# Patient Record
Sex: MALE | Race: ASIAN | NOT HISPANIC OR LATINO | Employment: OTHER | URBAN - METROPOLITAN AREA
[De-identification: names, ages, dates, MRNs, and addresses within clinical notes are randomized per-mention and may not be internally consistent; named-entity substitution may affect disease eponyms.]

---

## 2017-08-24 ENCOUNTER — DOCUMENTATION ONLY (OUTPATIENT)
Dept: VASCULAR SURGERY | Facility: CLINIC | Age: 67
End: 2017-08-24

## 2017-08-24 ENCOUNTER — OFFICE VISIT (OUTPATIENT)
Dept: INTERNAL MEDICINE | Facility: CLINIC | Age: 67
End: 2017-08-24

## 2017-08-24 VITALS
OXYGEN SATURATION: 96 % | HEART RATE: 78 BPM | BODY MASS INDEX: 25.62 KG/M2 | WEIGHT: 168.5 LBS | DIASTOLIC BLOOD PRESSURE: 84 MMHG | TEMPERATURE: 97.5 F | SYSTOLIC BLOOD PRESSURE: 138 MMHG | RESPIRATION RATE: 16 BRPM

## 2017-08-24 DIAGNOSIS — Z00.00 ENCOUNTER FOR ROUTINE ADULT HEALTH EXAMINATION WITHOUT ABNORMAL FINDINGS: ICD-10-CM

## 2017-08-24 DIAGNOSIS — F17.200 CURRENT SMOKER: Primary | ICD-10-CM

## 2017-08-24 DIAGNOSIS — Z11.59 NEED FOR HEPATITIS C SCREENING TEST: ICD-10-CM

## 2017-08-24 DIAGNOSIS — Z23 NEED FOR PROPHYLACTIC VACCINATION WITH TETANUS-DIPHTHERIA (TD): ICD-10-CM

## 2017-08-24 DIAGNOSIS — Z13.6 SCREENING FOR AAA (ABDOMINAL AORTIC ANEURYSM): ICD-10-CM

## 2017-08-24 DIAGNOSIS — R05.9 COUGH: ICD-10-CM

## 2017-08-24 LAB
CHOLEST SERPL-MCNC: 197 MG/DL
HCV AB SERPL QL IA: NONREACTIVE
HDLC SERPL-MCNC: 46 MG/DL
LDLC SERPL CALC-MCNC: 124 MG/DL
NONHDLC SERPL-MCNC: 151 MG/DL
TRIGL SERPL-MCNC: 135 MG/DL

## 2017-08-24 RX ORDER — NICOTINE 21 MG/24HR
1 PATCH, TRANSDERMAL 24 HOURS TRANSDERMAL EVERY 24 HOURS
Qty: 28 PATCH | Refills: 5 | Status: SHIPPED | OUTPATIENT
Start: 2017-08-24 | End: 2019-07-01

## 2017-08-24 RX ORDER — IBUPROFEN 400 MG/1
400 TABLET, FILM COATED ORAL EVERY 6 HOURS PRN
COMMUNITY
End: 2023-09-12

## 2017-08-24 NOTE — NURSING NOTE
Patient received Tdap vaccine.  See immunization list for administration details.  Patient tolerated injection well.     AYSE MCCORD at 8:39 AM on 8/24/2017.

## 2017-08-24 NOTE — MR AVS SNAPSHOT
After Visit Summary   8/24/2017    Silke Moralez    MRN: [id number]           Patient Information     Date Of Birth          1950        Visit Information        Provider Department      8/24/2017 7:30 AM [place] Mcnulty MD [facility]        Today's Diagnoses     Current smoker    -  1    Need for hepatitis C screening test        Need for prophylactic vaccination with tetanus-diphtheria (TD)        Screening for AAA (abdominal aortic aneurysm)        Encounter for routine adult health examination without abnormal findings        Cough          Care Instructions    [facility] Medication Refill Request Information:  * Please contact your pharmacy regarding ANY request for medication refills.  ** [facility] Prescription Fax = [phone]  * Please allow 3 business days for routine medication refills.  * Please allow 5 business days for controlled substance medication refills.     [facility] Test Result notification information:  *You will be notified with in 7-10 days of your appointment day regarding the results of your test.  If you are on MyChart you will be notified as soon as the provider has reviewed the results and signed off on them.    Primary Care Center [phone]     HOW TO QUIT SMOKING  Smoking is one of the hardest habits to break. About half of all those who have ever smoked have been able to quit, and most of those (about 70%) who still smoke want to quit. Here are some of the best ways to stop smoking.     KEEP TRYING:  It takes most smokers about 8 tries before they are finally able to fully quit. So, the more often you try and fail, the better your chance of quitting the next time! So, don't give up!    GO COLD TURKEY:  Most ex-smokers quit cold turkey. Trying to cut back gradually doesn't seem to work as well, perhaps because it continues the smoking habit. Also, it is possible to fool yourself by inhaling more while smoking fewer cigarettes. This  results in the same amount of nicotine in your body!    GET SUPPORT:  Support programs can make an important difference, especially for the heavy smoker. These groups offer lectures, methods to change your behavior and peer support. Call the free national Quitline for more information. 800-QUIT-NOW (325-018-7605). Low-cost or free programs are offered by many hospitals, local chapters of the American Lung Association (901-657-6126) and the American Cancer Society (903-756-3044). Support at home is important too. Non-smokers can help by offering praise and encouragement. If the smoker fails to quit, encourage them to try again!    OVER-THE-COUNTER MEDICINES:  For those who can't quit on their own, Nicotine Replacement Therapy (NRT) may make quitting much easier. Certain aids such as the nicotine patch, gum and lozenge are available without a prescription. However, it is best to use these under the guidance of your doctor. The skin patch provides a steady supply of nicotine to the body. Nicotine gum and lozenge gives temporary bursts of low levels of nicotine. Both methods take the edge off the craving for cigarettes. WARNING: If you feel symptoms of nicotine overdose, such as nausea, vomiting, dizziness, weakness, or fast heartbeat, stop using these and see your doctor.    PRESCRIPTION MEDICINES:  After evaluating your smoking patterns and prior attempts at quitting, your doctor may offer a prescription medicine such as bupropion (Zyban, Wellbutrin), varenicline (Chantix, Champix), a niocotine inhaler or nasal spray. Each has its unique advantage and side effects which your doctor can review with you.    HEALTH BENEFITS OF QUITTING:  The benefits of quitting start right away and keep improving the longer you go without smokin minutes: blood pressure and pulse return to normal  8 hours: oxygen levels return to normal  2 days: ability to smell and taste begins to improve as damaged nerves start to regrow  2-3  "weeks: circulation and lung function improves  1-9 months: decreased cough, congestion and shortness of breath; less tired  1 year: risk of heart attack decreases by half  5 years: risk of lung cancer decreases by half; risk of stroke becomes the same as a non-smoker  For information about how to quit smoking, visit the following links:  National Cancer [place] ,   Clearing the Air, Quit Smoking Today   - an online booklet. http://www.smokefree.gov/pubs/clearing_the_air.pdf  Smokefree.gov http://smokefree.gov/  QuitNet http://www.quitnet.com/    8673-3750 Chintan Grant, [address] [place] [address], [place] [address]. All rights reserved. This information is not intended as a substitute for professional medical care. Always follow your healthcare professional's instructions.        Nicotine Gum (Nicorette, polacrilex nicotine gum)      Dosing:    >25 cigarettes per day Dose   Weeks 1-6 Chew 1 piece of 4 mg gum every 1-2 hours. Maximum of 24 pieces a day.   Weeks 7-9 Chew 1 piece of 4 mg gum every 2-4 hours. Maximum of 24 pieces a day.   Weeks 10-12 Chew 1 piece of 4 mg gum every 4-8 hours. Maximum of 24 pieces a day.   < 25 cigarettes per day    Weeks 1-6 Chew 1 piece of 2 mg gum every 1-2 hours. Maximum of 24 pieces a day.   Weeks 7-9 Chew 1 piece of 2 mg gum every 2-4 hours. Maximum of 24 pieces a day.   Weeks 10-12 Chew 1 piece of 2 mg gum every 4-8 hours. Maximum of 24 pieces a day.     How to use nicotine gum:    Chew the gum slowly until you notice a tingly feeling or peppery taste.     Then \"park\" the gum between your teeth and your cheek and let it sit there until you don't notice the tingly feeling or taste anymore.     Chew the gum slowly again until you notice the tingly feeling or peppery taste again and \"park\" the gum on the other side of your mouth.     Repeat this process for 30 minutes, then throw the gum away.     Especially at the beginning, use the gum whenever you would normally smoke a " cigarette.    Some tips:    Do not smoke while you are using nicotine gum.     Do not swallow the gum.     Do not chew the gum like normal gum--you will swallow the nicotine instead of absorbing it. You are also more likely to get indigestion or nausea.     Do not drink anything, including water, while you are chewing gum.     Avoid acidic drinks like coffee and pop right before chewing the gum.     As your body becomes less dependent on nicotine, you will need to chew less gum.     Follow up with your health care provider if you have any questions and also to help taper your nicotine gum dose.    Side Effects:  Some people may experience some indigestion or nausea. Using proper chewing technique may help. If you experience any other troublesome or unusual side effects, call your health care provider.    Nicotine Patch    Dosing:    >10 cigarettes per day Dose   Weeks 1-6 Use one 21 mg patch per day.   Weeks 7-8 Use one 14 mg patch per day.   Weeks 9-10 Use one 7 mg patch per day   <10 cigarettes per day  Weeks 1-6 Use one 14 mg patch per day   Weeks 7-8 Use one 7 mg patch per day       How to use the Nicotine Patch:    Apply a new patch to non-hairy, clean, dry skin on the upper body or upper outer arm.  Remove backing from patch and press on skin.  Hold for 10 seconds.    Apply patch around the same time every day.    Wash your hands after applying the patch.    Remove the patch at the end of the day before you go to bed.    Apply a new patch the next morning.    Do not apply the patch to an area where you have worn a patch in the last week.   This will help prevent or reduce skin irritation.    Some Tips:    Do not smoke while you are using the nicotine patch!    Do not cut the nicotine patch -it will be ineffective.    Remove the patch prior to receiving an MRI since the patch may contain some metal.    Do not put the patch on irritated, cut, or burned skin.    If the patch falls off and cannot be reapplied, put  on a new patch.    Follow -up with your health care professional if you have any questions and also to help taper your nicotine patch dose.    Side Effects:  Some people experience some skin irritation where the patch was placed.  Moving around the site where you are putting the patch each day should help.  If you experience any other troublesome or unusual side effects, call your health care professional.            The Benefits of Living Smoke Free  What do you want to gain from quitting? Check off some reasons to quit.  Health Benefits  ___ Reduce my risk of lung cancer, heart disease, chronic lung disease  ___ Have fewer wrinkles and softer skin  ___ Improve my sense of taste and smell  ___ For pregnant women--reduce the risk of having a miscarriage, stillbirth, premature birth, or low-birth-weight baby  Personal Benefits  ___ Feel more in control of my life  ___ Have better-smelling hair, breath, clothes, home, and car  ___ Save time by not having to take smoke breaks, buy cigarettes, or hunt for a light  ___ Have whiter teeth  Family Benefits  ___ Reduce my children s respiratory tract infections  ___ Set a good example for my children  ___ Reduce my family s cancer risk  Financial Benefits  ___ Save hundreds of dollars each year that would be spent on cigarettes  ___ Save money on medical bills  ___ Save on life, health, and car insurance premiums    Those Dollars Add Up!  Cigarettes are expensive, and getting more expensive all the time. Do you realize how much money you are spending on cigarettes per year? What is the average amount you spend on a pack of cigarettes? What is the average number of packs that you smoke per day? Using your answers to these questions, fill in this formula to help you find out:  ($ _____ per pack) ×  ( _____ number of packs per day) × (365 days) =  $ _____ yearly cost of smoking  Besides tobacco, there are other costs, including extra cleaning bills and replacement costs for  clothing and furniture; medical expenses for smoking-related illnesses; and higher health, life, and car insurance premiums.    Cigars and Pipes Count Too!  Cigars and pipes are also dangerous. So are smokeless (chewing) tobacco and snuff. All of these products contain nicotine, a highly addictive substance that has harmful effects on your body. Quitting smoking means giving up all tobacco products.      6536-9085 [facility] [facility], [address] [place] [address], [place] [address]. All rights reserved. This information is not intended as a substitute for professional medical care. Always follow your healthcare professional's instructions.  Lung Cancer Screening   Frequently Asked Questions  If you are at high-risk for lung cancer, getting screened with low-dose computed tomography (LDCT) every year can help save your life. This handout offers answers to some of the most common questions about lung cancer screening. If you have other questions, please call [phone] ([phone]).     What is it?  Lung cancer screening uses special X-ray technology to create an image of your lung tissue. The exam is quick and easy and takes less than 10 seconds. We don t give you any medicine or use any needles. You can eat before and after the exam. You don t need to change your clothes as long as the clothing on your chest doesn t contain metal. But, you do need to be able to hold your breath for at least 6 seconds during the exam.    What is the goal of lung cancer screening?  The goal of lung cancer screening is to save lives. Many times, lung cancer is not found until a person starts having physical symptoms. Lung cancer screening can help detect lung cancer in the earliest stages when it may be easier to treat.    Who should be screened for lung cancer?  We suggest lung cancer screening for anyone who is at high-risk for lung cancer. You are in the high-risk group if you:      are between the ages of 55 and 79, and    have  smoked at least 1 pack of cigarettes a day for 30 or more years, and    still smoke or have quit within the past 15 years.    However, if you have a new cough or shortness of breath, you should talk to your doctor before being screened.    Some national lung health advocacy groups also recommend screening for people ages 50 to 79 who have smoked an average of 1 pack of cigarettes a day for 20 years. They must also have at least 1 other risk factor for lung cancer, not including exposure to secondhand smoke. Other risk factors are having had cancer in the past, emphysema, pulmonary fibrosis, COPD, a family history of lung cancer, or exposure to certain materials such as arsenic, asbestos, beryllium, cadmium, chromium, diesel fumes, nickel, radon or silica. Your care team can help you know if you have one of these risk factors.     Why does it matter if I have symptoms?  Certain symptoms can be a sign that you have a condition in your lungs that should be checked and treated by your doctor. These symptoms include fever, chest pain, a new or changing cough, shortness of breath that you have never felt before, coughing up blood or unexplained weight loss. Having any of these symptoms can greatly affect the results of lung cancer screening.       Should all smokers get an LDCT lung cancer screening exam?  It depends. Lung cancer screening is for a very specific group of men and women who have a history of heavy smoking over a long period of time (see  Who should be screened for lung cancer  above).  I am in the high-risk group, but have been diagnosed with cancer in the past. Is LDCT lung cancer screening right for me?  In some cases, you should not have LDCT lung screening, such as when your doctor is already following your cancer with CT scan studies. Your doctor will help you decide if LDCT lung screening is right for you.  Do I need to have a screening exam every year?  Yes. If you are in the high-risk group  described earlier, you should get an LDCT lung cancer screening exam every year until you are 79, or are no longer willing or able to undergo screening and possible procedures to diagnose and treat lung cancer.  How effective is LDCT at preventing death from lung cancer?  Studies have shown that LDCT lung cancer screening can lower the risk of death from lung cancer by 20 percent in people who are at high-risk.  What are the risks?  There are some risks and limitations of LDCT lung cancer screening. We want to make sure you understand the risks and benefits, so please let us know if you have any questions. Your doctor may want to talk with you more about these risks.    Radiation exposure: As with any exam that uses radiation, there is a very small increased risk of cancer. The amount of radiation in LDCT is small--about the same amount a person would get from a mammogram. Your doctor orders the exam when he or she feels the potential benefits outweigh the risks.    False negatives: No test is perfect, including LDCT. It is possible that you may have a medical condition, including lung cancer, that is not found during your exam. This is called a false negative result.    False positives and more testing: LDCT very often finds something in the lung that could be cancer, but in fact is not. This is called a false positive result. False positive tests often cause anxiety. To make sure these findings are not cancer, you may need to have more tests. These tests will be done only if you give us permission. Sometimes patients need a treatment that can have side effects, such as a biopsy. For more information on false positives, see  What can I expect from the results?     Findings not related to lung cancer: Your LDCT exam also takes pictures of areas of your body next to your lungs. In a very small number of cases, the CT scan will show an abnormal finding in one of these areas, such as your kidneys, adrenal glands, liver  or thyroid. This finding may not be serious, but you may need more tests. Your doctor can help you decide what other tests you may need, if any.  What can I expect from the results?  About 1 out of 4 LDCT exams will find something that may need more tests. Most of the time, these findings are lung nodules. Lung nodules are very small collections of tissue in the lung. These nodules are very common, and the vast majority--more than 97 percent--are not cancer (benign). Most are normal lymph nodes or small areas of scarring from past infections.  But, if a small lung nodule is found to be cancer, the cancer can be cured more than 90 percent of the time. To know if the nodule is cancer, we may need to get more images before your next yearly screening exam. If the nodule has suspicious features (for example, it is large, has an odd shape or grows over time), we will refer you to a specialist for further testing.  Will my doctor also get the results?  Yes. Your doctor will get a copy of your results.  Is it okay to keep smoking now that there s a cancer screening exam?  No. Tobacco is one of the strongest cancer-causing agents. It causes not only lung cancer, but other cancers and cardiovascular (heart) diseases as well. The damage caused by smoking builds over time. This means that the longer you smoke, the higher your risk of disease. While it is never too late to quit, the sooner you quit, the better.  Where can I find help to quit smoking?  The best way to prevent lung cancer is to stop smoking. If you have already quit smoking, congratulations and keep it up! For help on quitting smoking, please call QUITPLAN at 5-003-479-GQCI (0498) or the American Cancer Society at 1-377.687.4859 to find local resources near you.  One-on-one health coaching:  If you d prefer to work individually with a health care provider on tobacco cessation, we offer:      Medication Therapy Management:  Our specially trained pharmacists work  closely with you and your doctor to help you quit smoking.  Call [phone] or [phone] (toll free).     Can Do: Health coaching offered by [facility].  www.can-do-health.com    Resources to Help You Quit Smoking  If you have quit smoking or are thinking about quitting, congratulations! It can be hard to quit smoking, but the benefits are well worth it. To help you quit and stay smoke-free, there are many resources that can help.   Your health plan  If you have health insurance, call them for more details about their phone coaching programs.    Blue Cross and [facility]: 2-614-619-BLUE     [employer]: 7-423-769-QUIT     [facility]: 6-820-598-BLUE     [facility]: [phone]     [employer]: [phone]     [facility] members: [phone]     [facility]: [phone]     [facility]: [phone]     [facility]: [phone]     [facility]    One-on-one coaching with a specially trained Medication Therapy Management (MTM) pharmacist: [phone] or [phone] (toll free)    Group classes with a health  to help you create a personal quit plan, including prescription quit aides if necessary: [employer] group classes: [phone]  American Cancer Society: 1-834.534.6239  The American Cancer Society can help you find local resources to quit smoking.     QUITPLAN: 6-865-383-PLAN (8127)  QUITPLAN Services offers a telephone helpline, gum, patches and lozenges. These services are free for the uninsured and those without coverage. The QUITPLAN online program is free to everyone at www.quitplan.com    American Lung Association: 2-043-FFBC-USA (849-1161)  The American Lung Association offers a lung helpline as well as an online program, self-help book and group clinic support for quitting smoking at www.lung.org/stop-smoking    National Cancer [place]:  6-084-739-QUIT (4090)  The National Cancer [place] offers a telephone hotline, online text chat and a website with tools, information and support for smokers who want to quit at www.smokefree.gov            Follow-ups after your visit        Additional Services     MED THERAPY MANAGE REFERRAL       Your provider has referred you to: **[facility] Scheduling (numerous locations) [phone]   [email or web address]  [facility]: [facility] [phone]   [email or web address]    Reason for Referral: smoking cessation    The [facility] department will contact you to schedule an appointment.  You may also schedule the appointment by calling [phone].  For [facility] [place] patients, please call [phone] to confirm/schedule your appointment on the next business day.    This service is designed to help you get the most from your medications.  A specially trained Pharmacist will work closely with you and your providers to solve any questions, concerns, issues or problems related to your medications.    Please bring all of your prescription and non-prescription medications (such as vitamins, over-the-counter medications, and herbals) or a detailed medication list to your appointment.    If you have a glucose meter or other home monitoring information, please also bring this to your appointment (i.e. blood glucose log, blood pressure log, pain log, etc.).                  Follow-up notes from your care team     Return in about 4 weeks (around 9/21/2017).      Your next 10 appointments already scheduled     Aug 24, 2017  9:00 AM CDT   LAB with  LAB    Health Lab ([facility])    [address] [place] [id number] [phone]           Patient must bring picture ID. Patient should be prepared to give a  urine specimen  Please do not eat 10-12 hours before your appointment if you are coming in fasting for labs on lipids, cholesterol, or glucose (sugar). Pregnant women should follow their Care Team instructions. Water with medications is okay. Do not drink coffee or other fluids. If you have concerns about taking  your medications, please ask at office or if scheduling via [employer], send a message by clicking on Secure Messaging, Message Your Care Team.            Aug 24, 2017 10:00 AM CDT   US ABDOMINAL AORTIC LIMITED with [id number]   [facility] US ([facility])    [address] [place] [id number] [phone]           Please bring a list of your medicines (including vitamins, minerals and over-the-counter drugs). Also, tell your doctor about any allergies you may have. Wear comfortable clothes and leave your valuables at home.  Adults: No eating or drinking for 8 hours before the exam. You may take medicine with a small sip of water.  Children: - Children 6+ years: No food or drink for 6 hours before exam. - Children 1-5 years: No food or drink for 4 hours before exam. - Infants, breast-fed: may have breast milk up to 2 hours before exam. - Infants, formula: may have bottle until 4 hours before exam.  Please call the Imaging Department at your exam site with any questions.            Aug 24, 2017  8:00 PM CDT   (Arrive by 7:45 PM)   CT LUNG RESEARCH ALMAGUER with UCCT1   [facility] CT ([facility])    [address] [place] [id number] [phone]           Please bring any scans or X-rays taken at other hospitals, if similar tests were done. Also bring a list of your medicines, including vitamins, minerals and over-the-counter drugs. It is safest to leave personal items at home.  Be sure to tell your doctor:   If you have any allergies.   If there s any chance you are pregnant.   If you are  breastfeeding.   If you have any special needs.  You do not need to do anything special to prepare.  Please wear loose clothing, such as a sweat suit or jogging clothes. Avoid snaps, zippers and other metal. We may ask you to undress and put on a hospital gown.            Aug 30, 2017  7:00 AM CDT   FULL PULMONARY FUNCTION with  PFL C   [facility] Pulmonary Function Testing ([facility])    [address] [place], [place] [address]  [address] [facility] [address] [phone]            Sep 01, 2017 10:00 AM CDT   (Arrive by 9:45 AM)   Office Visit with Mora Carter RPH   [facility] ([facility])    [address] [place] [address]  [address] [facility] [address] [phone]           Bring a current list of meds and any records pertaining to this visit. For Physicals, please bring immunization records and any forms needing to be filled out. Please arrive 10 minutes early to complete paperwork.            Sep 28, 2017  7:30 AM CDT   (Arrive by 7:15 AM)   Return Visit with Montana Mcnulty MD   [facility] ([facility])    [address] [place] [address]  [address] [facility] [address] [phone]              Future tests that were ordered for you today     Open Future Orders        Priority Expected Expires Ordered    Hepatitis C Screen Reflex to HCV RNA Quant and Genotype Routine 8/24/2017 8/24/2018 8/24/2017    Lipid panel reflex to direct LDL - -(Today) Routine 8/24/2017 8/24/2018 8/24/2017    CT Chest Lung Cancer Scrn Low Dose wo Routine  8/24/2018 8/24/2017    US Abdominal Aorta Limited Routine  8/24/2018 8/24/2017    General PFT Lab (Please always keep checked) Routine  8/24/2018 8/24/2017    Pulmonary Function Test Routine  8/24/2018 8/24/2017            Who to contact     Please call your clinic at [phone] to:    Ask questions about your health    Make or cancel appointments    Discuss your  medicines    Learn about your test results    Speak to your doctor   If you have compliments or concerns about an experience at your clinic, or if you wish to file a complaint, please contact [facility] Patient Relations at [phone] or email us at [email or web address]         Additional Information About Your Visit        goDog Fetchhart Information     [facility] [employer] is an electronic gateway that provides easy, online access to your medical records. With Pocket Change, you can request a clinic appointment, read your test results, renew a prescription or communicate with your care team.     To sign up for Pocket Change visit the website at [email or web address]   You will be asked to enter the access code listed below, as well as some personal information. Please follow the directions to create your username and password.     Your access code is: [email or web address]  Expires: 2017  6:30 AM     Your access code will  in 90 days. If you need help or a new code, please contact your [facility] or call [phone] for assistance.        Care EveryWhere ID     This is your Care EveryWhere ID. This could be used by other organizations to access your [facility] medical records  [id number]        Your Vitals Were     Pulse Temperature Respirations Pulse Oximetry BMI (Body Mass Index)       78 97.5  F (36.4  C) 16 96% 25.62 kg/m2        Blood Pressure from Last 3 Encounters:   17 138/84   16 117/72   09/16/15 122/40    Weight from Last 3 Encounters:   17 76.4 kg (168 lb 8 oz)   16 79.6 kg (175 lb 6.4 oz)   09/16/15 78 kg (172 lb)              We Performed the Following     Abdominal Aortic Aneurysm Screening/Tracking     MED THERAPY MANAGE REFERRAL     Okay for Smoking Cessation Study (PLUTO) to Contact Patient     Prof fee: Shared Decisionmaking for Lung Cancer Screening     TDAP ( BOOSTRIX AGES 10-64)          Today's Medication Changes           These changes are accurate as of: 8/24/17  8:39 AM.  If you have any questions, ask your nurse or doctor.               Start taking these medicines.        Dose/Directions    nicotine 21 MG/24HR 24 hr patch   Commonly known as:  NICODERM CQ   Used for:  Current smoker   Started by:  Montana Mcnulty MD        Dose:  1 patch   Place 1 patch onto the skin every 24 hours   Quantity:  28 patch   Refills:  5       nicotine polacrilex 2 MG gum   Commonly known as:  NICORETTE   Used for:  Current smoker   Started by:  Montana Mcnulty MD        Dose:  2 mg   Place 1 each (2 mg) inside cheek as needed for smoking cessation Place 1 each inside cheek as needed for smoking cessation   Quantity:  240 each   Refills:  5            Where to get your medicines      These medications were sent to [facility] [address]  [address] [address]    Hours:  TRANSPLANT PHONE NUMBER [phone] Phone:  [phone]     nicotine 21 MG/24HR 24 hr patch    nicotine polacrilex 2 MG gum                Primary Care Provider Office Phone # Fax #    Paulino Harper [phone]       [address] [place] [address] [address] [facility] [address]  [employer] [address]        Equal Access to Services     BRANDY BENNETT AH: Hadii angel garcia hadasho Soomaali, waaxda luqadaha, qaybta kaalmada adeegyada, rosaura ronquillo hayalban franklin. So [facility] [phone].    ATENCIÓN: Si habla español, tiene a lee disposición servicios gratuitos de asistencia lingüística. Llame al [phone].    We comply with applicable federal civil rights laws and [place] laws. We do not discriminate on the basis of race, color, national origin, age, disability sex, sexual orientation or gender identity.            Thank you!     Thank you for choosing [facility]  for your care. Our goal is always to provide you with excellent care. Hearing back from our patients is one way we can  continue to improve our services. Please take a few minutes to complete the written survey that you may receive in the mail after your visit with us. Thank you!             Your Updated Medication List - Protect others around you: Learn how to safely use, store and throw away your medicines at www.disposemymeds.org.          This list is accurate as of: 8/24/17  8:39 AM.  Always use your most recent med list.                   Brand Name Dispense Instructions for use Diagnosis    ibuprofen 400 MG tablet    ADVIL/MOTRIN     Take 400 mg by mouth every 6 hours as needed for moderate pain        nicotine 21 MG/24HR 24 hr patch    NICODERM CQ    28 patch    Place 1 patch onto the skin every 24 hours    Current smoker       nicotine polacrilex 2 MG gum    NICORETTE    240 each    Place 1 each (2 mg) inside cheek as needed for smoking cessation Place 1 each inside cheek as needed for smoking cessation    Current smoker       omeprazole 20 MG CR capsule    priLOSEC    30 capsule    Take 1 capsule (20 mg) by mouth daily With food    Gastroesophageal reflux disease, esophagitis presence not specified

## 2017-08-24 NOTE — PROGRESS NOTES
PRIMARY CARE CENTER       SUBJECTIVE:  Silke Moralez is a 67 year old male with a PMHx of tobacco abuse who comes in for cough    Cold 2 weeks ago, continued cough, no rhinorrhea, no fever or chills. Sputum light yellow. No SOB, RED, chest pain, fever, chills.  Has had similar episodes in the past following colds lasting 3-4 weeks. Has never had PFTs. Smoking since 24 years 2 pack per day, has recently decreased to 1 pack per day. Has tried patches, no gums or lozenges. Felt cravings for cigarettes on top of patch and continued smoking. Currently does not have much desire to quit but is willing to try.    Medications and allergies reviewed by me today.     ROS:   Constitutional, HEENT, cardiovascular, pulmonary, gi and gu systems are negative, except as otherwise noted.    OBJECTIVE:/84  Pulse 78  Temp 97.5  F (36.4  C)  Resp 16  Wt 76.4 kg (168 lb 8 oz)  SpO2 96%  BMI 25.62 kg/m2   Wt Readings from Last 1 Encounters:   08/24/17 76.4 kg (168 lb 8 oz)       GENERAL APPEARANCE: healthy, alert and no distress     HEENT: nc/at, eomi, anicteric, mmm     RESP: diminished breath sounds diffusely, no wheeze, no prolonged expiratory phase, no crackles, breathing comfortably on room air     CV: regular rates and rhythm, normal S1 S2, no S3 or S4 and no murmur, click or rub     ABDOMEN:  soft, nontender, no HSM or masses     EXT: no edema, wwp     SKIN: no suspicious lesions or rashes     NEURO: alert, speech fluent, gait normal     PSYCH: mentation appears normal. and affect normal/bright     ASSESSMENT/PLAN:    Silke was seen today for cough and lab only.    Diagnoses and all orders for this visit:    Current smoker  Cough  >>30 pack year smoking history, qualifies for lung cancer screening. Spent significant time discussing risks of continued smoking. Patient not enthused about quitting but is willing to try nicotine patches in combination with short acting gum. Given cough and significant  history of smoking, concern for underlying COPD. No wheeze on exam but high risk. Has never had PFTs. Cough is in line with prior cough following URI, no worrisome dyspnea, fever, chills.  -     Prof fee: Shared Decisionmaking for Lung Cancer Screening  -     CT Chest Lung Cancer Scrn Low Dose wo; Future  -     Okay for Smoking Cessation Study (PLUTO) to Contact Patient  -     nicotine (NICODERM CQ) 21 MG/24HR 24 hr patch; Place 1 patch onto the skin every 24 hours  -     nicotine polacrilex (NICORETTE) 2 MG gum; Place 1 each (2 mg) inside cheek as needed for smoking cessation Place 1 each inside cheek as needed for smoking cessation  -     General PFT Lab (Please always keep checked); Future  -     Pulmonary Function Test; Future        -     MED THERAPY MANAGE REFERRAL    Screening for AAA (abdominal aortic aneurysm)  Patient has smoked ~30 pack years and is >65  -     Abdominal Aortic Aneurysm Screening/Tracking  -     US Abdominal Aorta Limited; Future    Need for prophylactic vaccination with tetanus-diphtheria (TD)  -     TDAP ( BOOSTRIX AGES 10-64)    Encounter for routine adult health examination without abnormal findings  Need for hepatitis C screening test  -     Hepatitis C Screen Reflex to HCV RNA Quant and Genotype; Future  -     Lipid panel reflex to direct LDL - -(Today); Future    Pt should return to clinic for f/u with me in 1 month     Montana Mcnulty MD  Aug 24, 2017    Pt was seen and plan of care discussed with Dr Burger.   Mr Moralez was s een and examined with the resident Dr Mcnulty ,I have reviewed note and  plan for care  And I agree with both.  Marcelino Burger Md    Lung Cancer Screening Shared Decision Making Visit     Silke Moralez is eligible for lung cancer screening on the basis of the information provided in my signed lung cancer screening order.     I have discussed with patient the risks and benefits of screening for lung cancer with low-dose CT.     The risks include:   radiation  exposure    false positives     over-diagnosis    The benefit of early detection of lung cancer is contingent upon adherence to annual screening or more frequent follow up if indicated.     Furthermore, reaping the benefits of screening requires Xiaoping Moralez to be willing and physically able to undergo diagnostic procedures, if indicated. Although no specific guide is available for determining severity of comorbidities, it is reasonable to withhold screening in patients who have greater mortality risk from other diseases.     We did discuss that the only way to prevent lung cancer is to not smoke. Smoking cessation assistance was offered.    I did not offer risk estimation using a calculator such as this one:    ShouldIScreen

## 2017-08-24 NOTE — PATIENT INSTRUCTIONS
[facility] Medication Refill Request Information:  * Please contact your pharmacy regarding ANY request for medication refills.  ** [facility] Prescription Fax = [phone]  * Please allow 3 business days for routine medication refills.  * Please allow 5 business days for controlled substance medication refills.     [facility] Test Result notification information:  *You will be notified with in 7-10 days of your appointment day regarding the results of your test.  If you are on MyChart you will be notified as soon as the provider has reviewed the results and signed off on them.    [facility] [phone]     HOW TO QUIT SMOKING  Smoking is one of the hardest habits to break. About half of all those who have ever smoked have been able to quit, and most of those (about 70%) who still smoke want to quit. Here are some of the best ways to stop smoking.     KEEP TRYING:  It takes most smokers about 8 tries before they are finally able to fully quit. So, the more often you try and fail, the better your chance of quitting the next time! So, don't give up!    GO COLD TURKEY:  Most ex-smokers quit cold turkey. Trying to cut back gradually doesn't seem to work as well, perhaps because it continues the smoking habit. Also, it is possible to fool yourself by inhaling more while smoking fewer cigarettes. This results in the same amount of nicotine in your body!    GET SUPPORT:  Support programs can make an important difference, especially for the heavy smoker. These groups offer lectures, methods to change your behavior and peer support. Call the free national Quitline for more information. 800-QUIT-NOW (751-903-0164). Low-cost or free programs are offered by many hospitals, local chapters of the American Lung Association (613-357-4381) and the American Cancer Society (139-596-8526). Support at home is important too. Non-smokers can help by offering praise and encouragement. If the smoker fails to quit,  encourage them to try again!    OVER-THE-COUNTER MEDICINES:  For those who can't quit on their own, Nicotine Replacement Therapy (NRT) may make quitting much easier. Certain aids such as the nicotine patch, gum and lozenge are available without a prescription. However, it is best to use these under the guidance of your doctor. The skin patch provides a steady supply of nicotine to the body. Nicotine gum and lozenge gives temporary bursts of low levels of nicotine. Both methods take the edge off the craving for cigarettes. WARNING: If you feel symptoms of nicotine overdose, such as nausea, vomiting, dizziness, weakness, or fast heartbeat, stop using these and see your doctor.    PRESCRIPTION MEDICINES:  After evaluating your smoking patterns and prior attempts at quitting, your doctor may offer a prescription medicine such as bupropion (Zyban, Wellbutrin), varenicline (Chantix, Champix), a niocotine inhaler or nasal spray. Each has its unique advantage and side effects which your doctor can review with you.    HEALTH BENEFITS OF QUITTING:  The benefits of quitting start right away and keep improving the longer you go without smokin minutes: blood pressure and pulse return to normal  8 hours: oxygen levels return to normal  2 days: ability to smell and taste begins to improve as damaged nerves start to regrow  2-3 weeks: circulation and lung function improves  1-9 months: decreased cough, congestion and shortness of breath; less tired  1 year: risk of heart attack decreases by half  5 years: risk of lung cancer decreases by half; risk of stroke becomes the same as a non-smoker  For information about how to quit smoking, visit the following links:  National Cancer [place] ,   Clearing the Air, Quit Smoking Today   - an online booklet. http://www.smokefree.gov/pubs/clearing_the_air.pdf  Smokefree.gov http://smokefree.gov/  QuitNet http://www.quitnet.com/    7381-7678 Chintan Grant, [address] [facility],  "[place] [address]. All rights reserved. This information is not intended as a substitute for professional medical care. Always follow your healthcare professional's instructions.        Nicotine Gum (Nicorette, polacrilex nicotine gum)      Dosing:    >25 cigarettes per day Dose   Weeks 1-6 Chew 1 piece of 4 mg gum every 1-2 hours. Maximum of 24 pieces a day.   Weeks 7-9 Chew 1 piece of 4 mg gum every 2-4 hours. Maximum of 24 pieces a day.   Weeks 10-12 Chew 1 piece of 4 mg gum every 4-8 hours. Maximum of 24 pieces a day.   < 25 cigarettes per day    Weeks 1-6 Chew 1 piece of 2 mg gum every 1-2 hours. Maximum of 24 pieces a day.   Weeks 7-9 Chew 1 piece of 2 mg gum every 2-4 hours. Maximum of 24 pieces a day.   Weeks 10-12 Chew 1 piece of 2 mg gum every 4-8 hours. Maximum of 24 pieces a day.     How to use nicotine gum:    Chew the gum slowly until you notice a tingly feeling or peppery taste.     Then \"park\" the gum between your teeth and your cheek and let it sit there until you don't notice the tingly feeling or taste anymore.     Chew the gum slowly again until you notice the tingly feeling or peppery taste again and \"park\" the gum on the other side of your mouth.     Repeat this process for 30 minutes, then throw the gum away.     Especially at the beginning, use the gum whenever you would normally smoke a cigarette.    Some tips:    Do not smoke while you are using nicotine gum.     Do not swallow the gum.     Do not chew the gum like normal gum--you will swallow the nicotine instead of absorbing it. You are also more likely to get indigestion or nausea.     Do not drink anything, including water, while you are chewing gum.     Avoid acidic drinks like coffee and pop right before chewing the gum.     As your body becomes less dependent on nicotine, you will need to chew less gum.     Follow up with your health care provider if you have any questions and also to help taper your nicotine gum dose.    Side " Effects:  Some people may experience some indigestion or nausea. Using proper chewing technique may help. If you experience any other troublesome or unusual side effects, call your health care provider.    Nicotine Patch    Dosing:    >10 cigarettes per day Dose   Weeks 1-6 Use one 21 mg patch per day.   Weeks 7-8 Use one 14 mg patch per day.   Weeks 9-10 Use one 7 mg patch per day   <10 cigarettes per day  Weeks 1-6 Use one 14 mg patch per day   Weeks 7-8 Use one 7 mg patch per day       How to use the Nicotine Patch:    Apply a new patch to non-hairy, clean, dry skin on the upper body or upper outer arm.  Remove backing from patch and press on skin.  Hold for 10 seconds.    Apply patch around the same time every day.    Wash your hands after applying the patch.    Remove the patch at the end of the day before you go to bed.    Apply a new patch the next morning.    Do not apply the patch to an area where you have worn a patch in the last week.   This will help prevent or reduce skin irritation.    Some Tips:    Do not smoke while you are using the nicotine patch!    Do not cut the nicotine patch -it will be ineffective.    Remove the patch prior to receiving an MRI since the patch may contain some metal.    Do not put the patch on irritated, cut, or burned skin.    If the patch falls off and cannot be reapplied, put on a new patch.    Follow -up with your health care professional if you have any questions and also to help taper your nicotine patch dose.    Side Effects:  Some people experience some skin irritation where the patch was placed.  Moving around the site where you are putting the patch each day should help.  If you experience any other troublesome or unusual side effects, call your health care professional.            The Benefits of Living Smoke Free  What do you want to gain from quitting? Check off some reasons to quit.  Health Benefits  ___ Reduce my risk of lung cancer, heart disease, chronic lung  disease  ___ Have fewer wrinkles and softer skin  ___ Improve my sense of taste and smell  ___ For pregnant women--reduce the risk of having a miscarriage, stillbirth, premature birth, or low-birth-weight baby  Personal Benefits  ___ Feel more in control of my life  ___ Have better-smelling hair, breath, clothes, home, and car  ___ Save time by not having to take smoke breaks, buy cigarettes, or hunt for a light  ___ Have whiter teeth  Family Benefits  ___ Reduce my children s respiratory tract infections  ___ Set a good example for my children  ___ Reduce my family s cancer risk  Financial Benefits  ___ Save hundreds of dollars each year that would be spent on cigarettes  ___ Save money on medical bills  ___ Save on life, health, and car insurance premiums    Those Dollars Add Up!  Cigarettes are expensive, and getting more expensive all the time. Do you realize how much money you are spending on cigarettes per year? What is the average amount you spend on a pack of cigarettes? What is the average number of packs that you smoke per day? Using your answers to these questions, fill in this formula to help you find out:  ($ _____ per pack) ×  ( _____ number of packs per day) × (365 days) =  $ _____ yearly cost of smoking  Besides tobacco, there are other costs, including extra cleaning bills and replacement costs for clothing and furniture; medical expenses for smoking-related illnesses; and higher health, life, and car insurance premiums.    Cigars and Pipes Count Too!  Cigars and pipes are also dangerous. So are smokeless (chewing) tobacco and snuff. All of these products contain nicotine, a highly addictive substance that has harmful effects on your body. Quitting smoking means giving up all tobacco products.      9721-1135 Chintan Grant, [address] [facility], [place] [address]. All rights reserved. This information is not intended as a substitute for professional medical care. Always follow your healthcare  professional's instructions.  Lung Cancer Screening   Frequently Asked Questions  If you are at high-risk for lung cancer, getting screened with low-dose computed tomography (LDCT) every year can help save your life. This handout offers answers to some of the most common questions about lung cancer screening. If you have other questions, please call [phone] ([phone]).     What is it?  Lung cancer screening uses special X-ray technology to create an image of your lung tissue. The exam is quick and easy and takes less than 10 seconds. We don t give you any medicine or use any needles. You can eat before and after the exam. You don t need to change your clothes as long as the clothing on your chest doesn t contain metal. But, you do need to be able to hold your breath for at least 6 seconds during the exam.    What is the goal of lung cancer screening?  The goal of lung cancer screening is to save lives. Many times, lung cancer is not found until a person starts having physical symptoms. Lung cancer screening can help detect lung cancer in the earliest stages when it may be easier to treat.    Who should be screened for lung cancer?  We suggest lung cancer screening for anyone who is at high-risk for lung cancer. You are in the high-risk group if you:      are between the ages of 55 and 79, and    have smoked at least 1 pack of cigarettes a day for 30 or more years, and    still smoke or have quit within the past 15 years.    However, if you have a new cough or shortness of breath, you should talk to your doctor before being screened.    Some national lung health advocacy groups also recommend screening for people ages 50 to 79 who have smoked an average of 1 pack of cigarettes a day for 20 years. They must also have at least 1 other risk factor for lung cancer, not including exposure to secondhand smoke. Other risk factors are having had cancer in the past, emphysema, pulmonary fibrosis, COPD, a  family history of lung cancer, or exposure to certain materials such as arsenic, asbestos, beryllium, cadmium, chromium, diesel fumes, nickel, radon or silica. Your care team can help you know if you have one of these risk factors.     Why does it matter if I have symptoms?  Certain symptoms can be a sign that you have a condition in your lungs that should be checked and treated by your doctor. These symptoms include fever, chest pain, a new or changing cough, shortness of breath that you have never felt before, coughing up blood or unexplained weight loss. Having any of these symptoms can greatly affect the results of lung cancer screening.       Should all smokers get an LDCT lung cancer screening exam?  It depends. Lung cancer screening is for a very specific group of men and women who have a history of heavy smoking over a long period of time (see  Who should be screened for lung cancer  above).  I am in the high-risk group, but have been diagnosed with cancer in the past. Is LDCT lung cancer screening right for me?  In some cases, you should not have LDCT lung screening, such as when your doctor is already following your cancer with CT scan studies. Your doctor will help you decide if LDCT lung screening is right for you.  Do I need to have a screening exam every year?  Yes. If you are in the high-risk group described earlier, you should get an LDCT lung cancer screening exam every year until you are 79, or are no longer willing or able to undergo screening and possible procedures to diagnose and treat lung cancer.  How effective is LDCT at preventing death from lung cancer?  Studies have shown that LDCT lung cancer screening can lower the risk of death from lung cancer by 20 percent in people who are at high-risk.  What are the risks?  There are some risks and limitations of LDCT lung cancer screening. We want to make sure you understand the risks and benefits, so please let us know if you have any questions.  Your doctor may want to talk with you more about these risks.    Radiation exposure: As with any exam that uses radiation, there is a very small increased risk of cancer. The amount of radiation in LDCT is small--about the same amount a person would get from a mammogram. Your doctor orders the exam when he or she feels the potential benefits outweigh the risks.    False negatives: No test is perfect, including LDCT. It is possible that you may have a medical condition, including lung cancer, that is not found during your exam. This is called a false negative result.    False positives and more testing: LDCT very often finds something in the lung that could be cancer, but in fact is not. This is called a false positive result. False positive tests often cause anxiety. To make sure these findings are not cancer, you may need to have more tests. These tests will be done only if you give us permission. Sometimes patients need a treatment that can have side effects, such as a biopsy. For more information on false positives, see  What can I expect from the results?     Findings not related to lung cancer: Your LDCT exam also takes pictures of areas of your body next to your lungs. In a very small number of cases, the CT scan will show an abnormal finding in one of these areas, such as your kidneys, adrenal glands, liver or thyroid. This finding may not be serious, but you may need more tests. Your doctor can help you decide what other tests you may need, if any.  What can I expect from the results?  About 1 out of 4 LDCT exams will find something that may need more tests. Most of the time, these findings are lung nodules. Lung nodules are very small collections of tissue in the lung. These nodules are very common, and the vast majority--more than 97 percent--are not cancer (benign). Most are normal lymph nodes or small areas of scarring from past infections.  But, if a small lung nodule is found to be cancer, the cancer  can be cured more than 90 percent of the time. To know if the nodule is cancer, we may need to get more images before your next yearly screening exam. If the nodule has suspicious features (for example, it is large, has an odd shape or grows over time), we will refer you to a specialist for further testing.  Will my doctor also get the results?  Yes. Your doctor will get a copy of your results.  Is it okay to keep smoking now that there s a cancer screening exam?  No. Tobacco is one of the strongest cancer-causing agents. It causes not only lung cancer, but other cancers and cardiovascular (heart) diseases as well. The damage caused by smoking builds over time. This means that the longer you smoke, the higher your risk of disease. While it is never too late to quit, the sooner you quit, the better.  Where can I find help to quit smoking?  The best way to prevent lung cancer is to stop smoking. If you have already quit smoking, congratulations and keep it up! For help on quitting smoking, please call [employer] at 9-138-329-TROC (3636) or the American Cancer Society at 1-592.499.2433 to find local resources near you.  One-on-one health coaching:  If you d prefer to work individually with a health care provider on tobacco cessation, we offer:      Medication Therapy Management:  Our specially trained pharmacists work closely with you and your doctor to help you quit smoking.  Call [phone] or [phone] (toll free).     Can Do: Health coaching offered by [facility].  www.can-do-health.com    Resources to Help You Quit Smoking  If you have quit smoking or are thinking about quitting, congratulations! It can be hard to quit smoking, but the benefits are well worth it. To help you quit and stay smoke-free, there are many resources that can help.   Your health plan  If you have health insurance, call them for more details about their phone coaching programs.    [employer] [place]  [place]: 1-888-662-BLUE     [employer]: 5-466-814-QUIT     [employer] [place]: 1-888-662-BLUE     [employer]: [phone]     [employer]: [phone]     [facility] Association members: [phone]     [facility] Plan: [phone]     [facility] Plan: [phone]     [facility]: [phone]     [place] [facility]    One-on-one coaching with a specially trained Medication Therapy Management (MTM) pharmacist: [phone] or [phone] (toll free)    Group classes with a health  to help you create a personal quit plan, including prescription quit aides if necessary: [employer] group classes: [phone]  American Cancer Society: 1-185.491.4267  The American Cancer Society can help you find local resources to quit smoking.     QUITPLAN: 6-843-805-PLAN (3352)  QUITPLAN Services offers a telephone helpline, gum, patches and lozenges. These services are free for the uninsured and those without coverage. The QUITPLAN online program is free to everyone at www.[employer].com    American Lung Association: 9-280-YCYD-USA (545-0422)  The American Lung Association offers a lung helpline as well as an online program, self-help book and group clinic support for quitting smoking at www.lung.org/stop-smoking    National Cancer [place]: 0-838-362-QUIT (2897)  The National Cancer [place] offers a telephone hotline, online text chat and a website with tools, information and support for smokers who want to quit at www.smokefree.gov

## 2017-08-25 ENCOUNTER — TELEPHONE (OUTPATIENT)
Dept: INTERNAL MEDICINE | Facility: CLINIC | Age: 67
End: 2017-08-25

## 2017-08-25 ENCOUNTER — DOCUMENTATION ONLY (OUTPATIENT)
Dept: VASCULAR SURGERY | Facility: CLINIC | Age: 67
End: 2017-08-25

## 2017-08-25 NOTE — TELEPHONE ENCOUNTER
"Spoke with wife, informed the results and recommendations. She verbalized understanding and agreed with the plan.    [facility]  ---------------------------------------------------------------        ------ Message -----     From: [place] Mcnulty MD     Sent: 8/24/2017  10:54 AM       To: Marsha Vidal RN  Subject: results                                          Loren Larson,    Could you call Mr Moralez with a Mandarin  to let him know:     \"Your cholesterol results are normal, however, given your other risk factors, it is recommended that you start cholesterol lowering medications to prevent heart attacks and strokes. Dr Mcnulty will discuss this further at your follow up\"    Thank you!!  [place]    "

## 2017-08-30 DIAGNOSIS — R05.9 COUGH: ICD-10-CM

## 2017-08-30 DIAGNOSIS — F17.200 CURRENT SMOKER: ICD-10-CM

## 2017-09-01 ENCOUNTER — OFFICE VISIT (OUTPATIENT)
Dept: PHARMACY | Facility: CLINIC | Age: 67
End: 2017-09-01
Payer: COMMERCIAL

## 2017-09-01 VITALS
HEART RATE: 77 BPM | SYSTOLIC BLOOD PRESSURE: 109 MMHG | DIASTOLIC BLOOD PRESSURE: 72 MMHG | WEIGHT: 172 LBS | BODY MASS INDEX: 26.15 KG/M2

## 2017-09-01 DIAGNOSIS — K21.9 GASTROESOPHAGEAL REFLUX DISEASE WITHOUT ESOPHAGITIS: ICD-10-CM

## 2017-09-01 DIAGNOSIS — Z71.6 ENCOUNTER FOR SMOKING CESSATION COUNSELING: ICD-10-CM

## 2017-09-01 DIAGNOSIS — E78.2 MIXED HYPERLIPIDEMIA: Primary | ICD-10-CM

## 2017-09-01 DIAGNOSIS — R52 OCCASIONAL PAIN: ICD-10-CM

## 2017-09-01 PROCEDURE — 99607 MTMS BY PHARM ADDL 15 MIN: CPT | Performed by: PHARMACIST

## 2017-09-01 PROCEDURE — 99605 MTMS BY PHARM NP 15 MIN: CPT | Performed by: PHARMACIST

## 2017-09-01 RX ORDER — CALCIUM CARBONATE 500 MG/1
2 TABLET, CHEWABLE ORAL 2 TIMES DAILY PRN
COMMUNITY

## 2017-09-01 RX ORDER — ACETAMINOPHEN 325 MG/1
325-650 TABLET ORAL EVERY 6 HOURS PRN
COMMUNITY
End: 2023-09-12

## 2017-09-01 NOTE — PROGRESS NOTES
SUBJECTIVE/OBJECTIVE:                           Silke Moralez is a 67 year old male coming in for an initial visit for Medication Therapy Management.  He was referred to me from Dr. Mcnulty.       Chief Complaint: Wants to quit smoking.    Allergies/ADRs: None  Tobacco: 0-1 pack per day - is interested in quittingTobacco Cessation Action Plan: Medication Therapy Management  (Referral to MTM)  Alcohol: not currently using    PMH: Reviewed in Epic    Medication Adherence: no issues reported    Smoking Cessation:  How much does he smoke:  1 ppd (ultralights with filter)  Why does he smoke: Whenever available, he simply likes to smoke. He is able to go 24 hours during travel without smoking.  Triggers for smoking include:  After meal, before bed, first thing in the morning.  How long has he been smokin years  What is the most he ever smoked:  1.5 ppd  What is the least he ever smoked:  1ppd  Tried quitting in the past: Not much before establishing care here.   What worked/didn t work in the past: Patch - he continued to smoke.   Why does he want to quit (motivators for quitting): Chronic cough, overall health  What scares you about quitting? Weight gain   How important is it for you to quit on a scale of 0 - 10? 5    Is patient currently pregnant: No  History of seizures/bipolar disease: No     Pt wants to quit smoking because he has a cough due to a cold. He understands that because he is a smoker, it takes him longer to get over a cold, thus he continues to cough. He currently smokes 1 ppd of ultra lights with a filter as he is trying to cut back on smoking.    Hyperlipidemia: Current therapy includes no current medications.    The 10-year ASCVD risk score (Juvenal DC Jr, et al., 2013) is: 23.2%    Values used to calculate the score:      Age: 67 years      Sex: Male      Is Non- : No      Diabetic: No      Tobacco smoker: Yes      Systolic Blood Pressure: 138 mmHg      Is BP treated: No       HDL Cholesterol: 46 mg/dL      Total Cholesterol: 197 mg/dL    GERD: Current medications include: TUMS a few times per week as well as a Chinese Herbal bee medicine he does not know the name of. Has history of gastric ulcer but not GI bleed. Patient feels that current regimen is effective. Pt is no longer taking omeprazole so this was removed from med list today.    Occasional Pain: Pt is currently taking OTC Ibuprofen occasionally without complaint of issue.    Current labs include:  BP Readings from Last 3 Encounters:   08/24/17 138/84   04/13/16 117/72   09/16/15 122/40     Today's Vitals: /72  Pulse 77  Wt 172 lb (78 kg)  BMI 26.15 kg/m2  Lab Results   Component Value Date    A1C 5.0 02/06/2012   .  Lab Results   Component Value Date    CHOL 197 08/24/2017     Lab Results   Component Value Date    TRIG 135 08/24/2017     Lab Results   Component Value Date    HDL 46 08/24/2017     Lab Results   Component Value Date     08/24/2017       Liver Function Studies -   Recent Labs   Lab Test  06/04/14   0134   PROTTOTAL  7.6   ALBUMIN  4.3   BILITOTAL  0.2   ALKPHOS  67   AST  25   ALT  26       No results found for: UCRR, MICROL, UMALCR    Last Basic Metabolic Panel:  Lab Results   Component Value Date     06/04/2014      Lab Results   Component Value Date    POTASSIUM 4.1 06/04/2014     Lab Results   Component Value Date    CHLORIDE 109 06/04/2014     Lab Results   Component Value Date    BUN 12 06/04/2014     Lab Results   Component Value Date    CR 0.90 06/04/2014     GFR Estimate   Date Value Ref Range Status   06/04/2014 85 >60 mL/min/1.7m2 Final   10/18/2007 78 >60 mL/min/1.7m2 Final   06/28/2007 >90 >60 mL/min/1.7m2 Final     GFR Estimate If Black   Date Value Ref Range Status   06/04/2014 >90 >60 mL/min/1.7m2 Final   10/18/2007 >90 >60 mL/min/1.7m2 Final   06/28/2007 >90 >60 mL/min/1.7m2 Final     TSH   Date Value Ref Range Status   10/18/2007 1.56 0.4 - 5.0 mU/L Final   ]    Most Recent  "Immunizations   Administered Date(s) Administered     Influenza (IIV3) 10/25/2013     Pneumococcal (PCV 13) 04/13/2016     Pneumococcal 23 valent 10/25/2013     TDAP Vaccine (Boostrix) 08/24/2017     Zoster vaccine, live 04/13/2016       ASSESSMENT:                             Current medications were reviewed today.       Medication Adherence: no issues identified    Smoking Cessation: Need improvement  We discussed health benefits outside of weight gain that smoking cessation can bring. We also went over the tips and strategies for quitting, including clarifying usage of the patch and replacing smoking with gum when he has the urge to smoke. Patient is motivated to quit and has set a quit date of 9/8.    Pt is ready to quit using tobacco.  We discussed: benefits of quitting, ways to cope with cravings and manage triggers, hints for managing nicotine withdrawal, ways to prepare for a quit date, ways to reduce stress to prevent relapse, rewards for quitting and total abstinence. Based on patient preferences and history, patient would benefit from nicotine gum nicotine patch.  Patient was provided the \"Quitting for Good with Treatment and Support\" education booklet.     Hyperlipidemia: Needs Improvement. Pt is not on moderate intensity statin which is indicated based on 2013 ACC/AHA guidelines for lipid management.      GERD: Stable. Safety of unknown herbal is not assessed today but pt reports stable.     Occasional Pain: Need improvement. As pt is >65 and pre-hypertensive, it is better for him to switch to APAP for management of occasional pain.      PLAN:                            1. Pt to continue using nicotine patches and gum as prescribed and directed today.    2. Pt to use Delsym to help with cough.    3. Pt to use APAP instead of ibuprofen for occasional aches/pain.    4. Pt to follow-up with Dr. Coffman on 9/6 and discuss cholesterol medication at that time.    I spent 60 minutes with this patient " today  . All changes were made via collaborative practice agreement with Dr. Burger. A copy of the visit note was provided to the patient's primary care provider.    Will follow up on 9/13 at 11am via phone call to assess status of smoking cessation and troubleshoot any issues.    The patient was given a summary of these recommendations as an after visit summary.     Mariam Hamilton, 4th Year Pharmacy Student

## 2017-09-01 NOTE — MR AVS SNAPSHOT
After Visit Summary   9/1/2017    Silke Moralez    MRN: [id number]           Patient Information     Date Of Birth          1950        Visit Information        Provider Department      [facility]1/2017 10:00 [facility]        Care Instructions    Recommendations from today's MTM visit:                                                    MTM (medication therapy management) is a service provided by a clinical pharmacist designed to help you get the most of out of your medicines.   Today we reviewed what your medicines are for, how to know if they are working, that your medicines are safe and how to make your medicine regimen as easy as possible.     1. You may use Delsym as directed on the box over the counter for cough, but ultimately quitting smoking and seeing if you have any underlying lung issues needs to be addressed for you to stop coughing.    2. Whenever possible, try to use Tylenol (acetaminophen) instead of ibuprofen to reduce chance of high blood pressure and rare chance of stomach bleed. Use as directed on over the counter packaging.    3. Continue using nicotine gum to replace cigarettes before your quit date whenever possible. On the morning of your quit date, start using patches and change them daily, removing at night or the next morning.    4. Talk with your doctor about a cholesterol medication. I would encourage you to start one with them to reduce your risk for heart attack and stroke.    5. Your quit date is September 8th. Review your workbook and start using gum over the next week to prepare. Throw away your cigarettes on the 7th.    Next MTM visit: I will call on Wednesday the 13th around 11 to see how things are going and help with anything you need    To schedule another MTM appointment, please call the clinic directly or you may call the MTM scheduling line at [phone] or toll-free at [phone].     My Clinical  Pharmacist's contact information:                                                      It was a pleasure seeing you today!  Please feel free to contact me with any questions or concerns you have.      Mora Carter PharmD  Medication Therapy Management Provider  Phone:[phone]  Pager: [phone]    You may receive a survey about the [facility] services you received.  I would appreciate your feedback to help me serve you better in the future. Please fill it out and return it when you can. Your comments will be anonymous.              Follow-ups after your visit        Your next 10 appointments already scheduled     Sep 06, 2017 11:30 AM CDT   (Arrive by 11:15 AM)   Return Visit with Marah Barnes MD   [facility] ([facility])    [address] [address] [phone]            Sep 13, 2017 11:00 AM CDT   TELEMEDICINE with Mora Carter RPH   [facility] Medication Therapy Management ([facility])    [address] [address] [phone]           Note: this is not an onsite visit; there is no need to come to the facility.              Who to contact     If you have questions or need follow up information about today's clinic visit or your schedule please contact [facility] MEDICATION THERAPY MANAGEMENT directly at [phone].  Normal or non-critical lab and imaging results will be communicated to you by MyChart, letter or phone within 4 business days after the clinic has received the results. If you do not hear from us within 7 days, please contact the clinic through MyChart or phone. If you have a critical or abnormal lab result, we will notify you by phone as soon as possible.  Submit refill requests through [email or web address] or call your pharmacy and they will forward the refill request to us. Please allow 3 business days for your refill to be completed.           "Additional Information About Your Visit        MyChart Information     [employer] lets you send messages to your doctor, view your test results, renew your prescriptions, schedule appointments and more. To sign up, go to [email or web address] . Click on \"Log in\" on the left side of the screen, which will take you to the Welcome page. Then click on \"Sign up Now\" on the right side of the page.     You will be asked to enter the access code listed below, as well as some personal information. Please follow the directions to create your username and password.     Your access code is: [email or web address]  Expires: 2017  6:30 AM     Your access code will  in 90 days. If you need help or a new code, please call your [place] clinic or [phone].        Care EveryWhere ID     This is your Care EveryWhere ID. This could be used by other organizations to access your [facility] medical records  [id number]        Your Vitals Were     Pulse BMI (Body Mass Index)                77 26.15 kg/m2           Blood Pressure from Last 3 Encounters:   17 109/72   17 138/84   16 117/72    Weight from Last 3 Encounters:   17 172 lb (78 kg)   17 168 lb 8 oz (76.4 kg)   16 175 lb 6.4 oz (79.6 kg)              Today, you had the following     No orders found for display       Primary Care Provider Office Phone # Fax #    [place] Mcnulty [phone]       [facility] [address]  [facility] [address]        Equal Access to Services     ALONDRA BENNETT : Hadii angel harley Sorafat, waaxda luqadaha, qaybta kaalmarosaura monterroso. So [facility] [phone].    ATENCIÓN: Si habla español, tiene a lee disposición servicios gratuitos de asistencia lingüística. Llame al [phone].    We comply with applicable federal civil rights laws and [place] laws. We do not discriminate on the basis of race, color, national origin, age, disability sex, " sexual orientation or gender identity.            Thank you!     Thank you for choosing [facility] MEDICATION THERAPY MANAGEMENT  for your care. Our goal is always to provide you with excellent care. Hearing back from our patients is one way we can continue to improve our services. Please take a few minutes to complete the written survey that you may receive in the mail after your visit with us. Thank you!             Your Updated Medication List - Protect others around you: Learn how to safely use, store and throw away your medicines at www.disposemymeds.org.          This list is accurate as of: 9/1/17 11:06 AM.  Always use your most recent med list.                   Brand Name Dispense Instructions for use Diagnosis    acetaminophen 325 MG tablet    TYLENOL     Take 325-650 mg by mouth every 6 hours as needed for mild pain        calcium carbonate 500 MG chewable tablet    TUMS     Take 2 chew tab by mouth 2 times daily as needed for heartburn        ibuprofen 400 MG tablet    ADVIL/MOTRIN     Take 400 mg by mouth every 6 hours as needed for moderate pain        nicotine 21 MG/24HR 24 hr patch    NICODERM CQ    28 patch    Place 1 patch onto the skin every 24 hours    Current smoker       nicotine polacrilex 2 MG gum    NICORETTE    240 each    Place 1 each (2 mg) inside cheek as needed for smoking cessation Place 1 each inside cheek as needed for smoking cessation    Current smoker

## 2017-09-01 NOTE — PATIENT INSTRUCTIONS
Recommendations from today's MTM visit:                                                    MTM (medication therapy management) is a service provided by a clinical pharmacist designed to help you get the most of out of your medicines.   Today we reviewed what your medicines are for, how to know if they are working, that your medicines are safe and how to make your medicine regimen as easy as possible.     1. You may use Delsym as directed on the box over the counter for cough, but ultimately quitting smoking and seeing if you have any underlying lung issues needs to be addressed for you to stop coughing.    2. Whenever possible, try to use Tylenol (acetaminophen) instead of ibuprofen to reduce chance of high blood pressure and rare chance of stomach bleed. Use as directed on over the counter packaging.    3. Continue using nicotine gum to replace cigarettes before your quit date whenever possible. On the morning of your quit date, start using patches and change them daily, removing at night or the next morning.    4. Talk with your doctor about a cholesterol medication. I would encourage you to start one with them to reduce your risk for heart attack and stroke.    5. Your quit date is September 8th. Review your workbook and start using gum over the next week to prepare. Throw away your cigarettes on the 7th.    Next MTM visit: I will call on Wednesday the 13th around 11 to see how things are going and help with anything you need    To schedule another MTM appointment, please call the clinic directly or you may call the MTM scheduling line at [phone] or toll-free at [phone].     My Clinical Pharmacist's contact information:                                                      It was a pleasure seeing you today!  Please feel free to contact me with any questions or concerns you have.      Mora Carter PharmD  Medication Therapy Management Provider  Phone:[phone]  Pager: [phone]    You may receive  a survey about the MTM services you received.  I would appreciate your feedback to help me serve you better in the future. Please fill it out and return it when you can. Your comments will be anonymous.

## 2017-09-05 LAB
DLCOUNC-%PRED-PRE: 86 %
DLCOUNC-PRE: 21.88 ML/MIN/MMHG
DLCOUNC-PRED: 25.42 ML/MIN/MMHG
ERV-%PRED-PRE: 59 %
ERV-PRE: 0.59 L
ERV-PRED: 0.98 L
EXPTIME-PRE: 9.26 SEC
FEF2575-%PRED-POST: 62 %
FEF2575-%PRED-PRE: 34 %
FEF2575-POST: 1.41 L/SEC
FEF2575-PRE: 0.77 L/SEC
FEF2575-PRED: 2.25 L/SEC
FEFMAX-%PRED-PRE: 74 %
FEFMAX-PRE: 5.9 L/SEC
FEFMAX-PRED: 7.94 L/SEC
FEV1-%PRED-PRE: 62 %
FEV1-PRE: 1.75 L
FEV1FEV6-PRE: 68 %
FEV1FEV6-PRED: 78 %
FEV1FVC-PRE: 64 %
FEV1FVC-PRED: 77 %
FEV1SVC-PRE: 55 %
FEV1SVC-PRED: 64 %
FIFMAX-PRE: 2.34 L/SEC
FRCPLETH-%PRED-PRE: 120 %
FRCPLETH-PRE: 4.26 L
FRCPLETH-PRED: 3.54 L
FVC-%PRED-PRE: 75 %
FVC-PRE: 2.73 L
FVC-PRED: 3.63 L
IC-%PRED-PRE: 77 %
IC-PRE: 2.62 L
IC-PRED: 3.38 L
RVPLETH-%PRED-PRE: 145 %
RVPLETH-PRE: 3.67 L
RVPLETH-PRED: 2.53 L
TLCPLETH-%PRED-PRE: 103 %
TLCPLETH-PRE: 6.88 L
TLCPLETH-PRED: 6.62 L
VA-%PRED-PRE: 90 %
VA-PRE: 5.72 L
VC-%PRED-PRE: 73 %
VC-PRE: 3.21 L
VC-PRED: 4.36 L

## 2017-09-06 ENCOUNTER — OFFICE VISIT (OUTPATIENT)
Dept: INTERNAL MEDICINE | Facility: CLINIC | Age: 67
End: 2017-09-06

## 2017-09-06 VITALS
DIASTOLIC BLOOD PRESSURE: 75 MMHG | WEIGHT: 170.5 LBS | BODY MASS INDEX: 25.92 KG/M2 | SYSTOLIC BLOOD PRESSURE: 122 MMHG | HEART RATE: 82 BPM

## 2017-09-06 DIAGNOSIS — J44.9 CHRONIC OBSTRUCTIVE PULMONARY DISEASE, UNSPECIFIED COPD TYPE (H): ICD-10-CM

## 2017-09-06 DIAGNOSIS — E78.00 HYPERCHOLESTEREMIA: Primary | ICD-10-CM

## 2017-09-06 RX ORDER — ALBUTEROL SULFATE 90 UG/1
2 AEROSOL, METERED RESPIRATORY (INHALATION) EVERY 6 HOURS PRN
Qty: 1 INHALER | Refills: 11 | Status: SHIPPED | OUTPATIENT
Start: 2017-09-06 | End: 2019-07-01

## 2017-09-06 RX ORDER — TIOTROPIUM BROMIDE 18 UG/1
CAPSULE ORAL; RESPIRATORY (INHALATION)
Qty: 90 CAPSULE | Refills: 3 | Status: SHIPPED | OUTPATIENT
Start: 2017-09-06 | End: 2019-10-02

## 2017-09-06 RX ORDER — AMLODIPINE BESYLATE 2.5 MG/1
2.5 TABLET ORAL DAILY
COMMUNITY
End: 2017-09-06

## 2017-09-06 RX ORDER — ATORVASTATIN CALCIUM 40 MG/1
40 TABLET, FILM COATED ORAL DAILY
Qty: 90 TABLET | Refills: 3 | Status: SHIPPED | OUTPATIENT
Start: 2017-09-06 | End: 2019-07-01

## 2017-09-06 ASSESSMENT — PAIN SCALES - GENERAL: PAINLEVEL: NO PAIN (0)

## 2017-09-06 NOTE — PROGRESS NOTES
PRIMARY CARE CENTER       SUBJECTIVE:  Silke Moralez is a 67 year old male with a PMHx of HLD and smoking presenting for follow up of PFTs. Wife is with him and acts as  although the patient can speak some english. About a month ago he had a cold and developed a persistent cough which he saw Dr. Mcnulty for on 8/24. Due to his strong smoking Hx he underwent CT and PFTs to evaluate for COPD and they are curious to discuss the results. He states he has been feeling better and the cough has been decreasing. He met with Mora for a smoking cessation discussion which was very motivating for him and has not smoked for 2 days. He feels the nicotine gum is very helpful. States his cholesterol was also found to be a little high and are curious if he needs to take medications for this. Denies SOB, fevers/chills, runny nose, or other complaints. He exercises regularly and does not have RED.        Past Medical History:   Diagnosis Date     Gastric ulcer      Hyperlipidemia      Smoker      Past Surgical History:   Procedure Laterality Date     ESOPHAGOSCOPY, GASTROSCOPY, DUODENOSCOPY (EGD), COMBINED  6/30/2014    Procedure: COMBINED ESOPHAGOSCOPY, GASTROSCOPY, DUODENOSCOPY (EGD);  Surgeon: Stone Wan MD;  Location:  GI     NO HISTORY OF SURGERY       Family History   Problem Relation Age of Onset     Hypertension Father      sibling     HEART DISEASE Father      Social History     Social History     Marital status:      Spouse name: N/A     Number of children: N/A     Years of education: N/A     Social History Main Topics     Smoking status: Current Every Day Smoker     Packs/day: 1.00     Types: Cigarettes     Smokeless tobacco: Never Used      Comment: 40 - 80 pack year history.  Smoked up to two packs per day. Trying to cut back     Alcohol use No     Drug use: No     Sexual activity: Not Asked     Current Outpatient Prescriptions   Medication     atorvastatin (LIPITOR) 40 MG  tablet     tiotropium (SPIRIVA HANDIHALER) 18 MCG capsule     albuterol (PROAIR HFA/PROVENTIL HFA/VENTOLIN HFA) 108 (90 BASE) MCG/ACT Inhaler     acetaminophen (TYLENOL) 325 MG tablet     calcium carbonate (TUMS) 500 MG chewable tablet     ibuprofen (ADVIL/MOTRIN) 400 MG tablet     nicotine (NICODERM CQ) 21 MG/24HR 24 hr patch     nicotine polacrilex (NICORETTE) 2 MG gum     No current facility-administered medications for this visit.       No Known Allergies        ROS:   Constitutional, neuro, ENT, endocrine, pulmonary, cardiac, gastrointestinal, genitourinary, musculoskeletal, integument and psychiatric systems are negative, except as otherwise noted in HPI      OBJECTIVE:/75  Pulse 82  Wt 77.3 kg (170 lb 8 oz)  BMI 25.92 kg/m2   Wt Readings from Last 1 Encounters:   09/06/17 77.3 kg (170 lb 8 oz)       GENERAL APPEARANCE: Sitting in no acute distress     EYES: EOMI, PERRL     HENT: nose and mouth without ulcers or lesions     NECK: no adenopathy, no asymmetry, masses, or scars and thyroid normal to palpation     RESP: Good air movement, somewhat diminished/distant breath sounds, no wheezes, rhonchi, rales     CV: regular rate and rhythm, normal S1 S2, no S3 or S4 and no murmur, click or rub     ABDOMEN:  soft, nontender, no HSM or masses and bowel sounds normal     MS: No gross abnormalities ROM intact throughout.     SKIN: no suspicious lesions or rashes     NEURO: Normal strength and tone, sensory exam grossly normal, mentation intact and speech normal      PSYCH: mentation appears normal. and affect normal    RESULTS:     PFTs 8/30/17:       ASSESSMENT/PLAN:  Silke Moralez is a 67 year old male with a PMHx of HLD and smoking presenting for follow up of PFTs. Problems and plans discussed today outlined below.     Chronic obstructive pulmonary disease, unspecified COPD type (H)  PFTs on 8/30/17 with FVC 75%, FEV1 62% and FVC/FEV1 64%. Improvement with bronchodilator. PFTs with asymptomatic baseline puts  him as GOLD stage 1. Will begin tiotropium with rescue albuterol inhaler.   -     tiotropium (SPIRIVA HANDIHALER) 18 MCG capsule; Inhale contents of one capsule daily.  -     albuterol (PROAIR HFA/PROVENTIL HFA/VENTOLIN HFA) 108 (90 BASE) MCG/ACT Inhaler; Inhale 2 puffs into the lungs every 6 hours as needed for shortness of breath / dyspnea or wheezing    Hypercholesteremia  . ASCVD Cardiac risk of 21% over 10 years for major events. Will begin statin at this time but consider reevaluation if he continues cessation of smoking and no need for BP meds as noted below.    -     atorvastatin (LIPITOR) 40 MG tablet; Take 1 tablet (40 mg) by mouth daily    HTN  Amlodipine started 6 months ago on ED visit in [place]. Pressures were elevated to 160s and 140s SBP  but he states he was in a lot of pain. At home reports when he doesn't take the amlodipine his SBPs are in the 130s-140s. Given Borderline pressures and recent commitment to quitting smoking, will discontinue amlodipine and reassess at future visit the need for this medication        -     Discontinued amlodipine       -     Reevaluate at next visit    Pt should return to clinic for f/u with Dr. Coffman in 3 months    Jenifer Sorensen MD  Sep 6, 2017      Pt was seen and examined with Dr. Xie;  I confirmed the lung exam findings, I agree with the detailed A/P as documented above.    Marah Barnes MD

## 2017-09-06 NOTE — NURSING NOTE
Chief Complaint   Patient presents with     Results     Patient here to discuss results.       Monse Green CMA at 11:17 AM on 9/6/2017.

## 2017-09-06 NOTE — PATIENT INSTRUCTIONS
[facility] Medication Refill Request Information:  * Please contact your pharmacy regarding ANY request for medication refills.  ** [facility] Prescription Fax = [phone]  * Please allow 3 business days for routine medication refills.  * Please allow 5 business days for controlled substance medication refills.     [facility] Test Result notification information:  *You will be notified with in 7-10 days of your appointment day regarding the results of your test.  If you are on MyChart you will be notified as soon as the provider has reviewed the results and signed off on them.    [facility] [phone]

## 2017-09-06 NOTE — MR AVS SNAPSHOT
After Visit Summary   9/6/2017    Silke Moralez    MRN: [id number]           Patient Information     Date Of Birth          1950        Visit Information        Provider Department      9/6/2017 11:30 AM Marah Kraus MD [facility]        Today's Diagnoses     Hypercholesteremia    -  1    Chronic obstructive pulmonary disease, unspecified COPD type (H)          Care Instructions    Primary Care Center Medication Refill Request Information:  * Please contact your pharmacy regarding ANY request for medication refills.  ** [facility] Prescription Fax = [phone]  * Please allow 3 business days for routine medication refills.  * Please allow 5 business days for controlled substance medication refills.     Primary Care Center Test Result notification information:  *You will be notified with in 7-10 days of your appointment day regarding the results of your test.  If you are on MyChart you will be notified as soon as the provider has reviewed the results and signed off on them.    Primary Care Center [phone]             Follow-ups after your visit        Follow-up notes from your care team     Return in about 3 months (around 12/6/2017).      Your next 10 appointments already scheduled     Sep 13, 2017 11:00 AM CDT   TELEMEDICINE with Mora Carter RPH   [facility] Medication Therapy Management ([facility])    [address] [place] [id number] [phone]           Note: this is not an onsite visit; there is no need to come to the facility.              Who to contact     Please call your clinic at [phone] to:    Ask questions about your health    Make or cancel appointments    Discuss your medicines    Learn about your test results    Speak to your doctor   If you have compliments or concerns about an experience at your clinic, or if you wish to file a complaint, please contact [facility]  Physicians Patient Relations at [phone] or email us at [email or web address]         Additional Information About Your Visit        Up[employer] Information     [employer] is an electronic gateway that provides easy, online access to your medical records. With [employer], you can request a clinic appointment, read your test results, renew a prescription or communicate with your care team.     To sign up for [employer] visit the website at [email or web address]   You will be asked to enter the access code listed below, as well as some personal information. Please follow the directions to create your username and password.     Your access code is: [email or web address]  Expires: 2017  6:30 AM     Your access code will  in 90 days. If you need help or a new code, please contact your [facility] or call [phone] for assistance.        Care EveryWhere ID     This is your Care EveryWhere ID. This could be used by other organizations to access your [place] medical records  [id number]        Your Vitals Were     Pulse BMI (Body Mass Index)                82 25.92 kg/m2           Blood Pressure from Last 3 Encounters:   17 122/75   17 109/72   17 138/84    Weight from Last 3 Encounters:   17 77.3 kg (170 lb 8 oz)   17 78 kg (172 lb)   17 76.4 kg (168 lb 8 oz)              Today, you had the following     No orders found for display         Today's Medication Changes          These changes are accurate as of: 17 12:14 PM.  If you have any questions, ask your nurse or doctor.               Start taking these medicines.        Dose/Directions    albuterol 108 (90 BASE) MCG/ACT Inhaler   Commonly known as:  PROAIR HFA/PROVENTIL HFA/VENTOLIN HFA   Used for:  Chronic obstructive pulmonary disease, unspecified COPD type (H)   Started by:  Marah Kraus MD        Dose:  2 puff   Inhale 2 puffs into the lungs every 6 hours as  needed for shortness of breath / dyspnea or wheezing   Quantity:  1 Inhaler   Refills:  11       atorvastatin 40 MG tablet   Commonly known as:  LIPITOR   Used for:  Hypercholesteremia   Started by:  Marah Kraus MD        Dose:  40 mg   Take 1 tablet (40 mg) by mouth daily   Quantity:  90 tablet   Refills:  3       tiotropium 18 MCG capsule   Commonly known as:  SPIRIVA HANDIHALER   Used for:  Chronic obstructive pulmonary disease, unspecified COPD type (H)   Started by:  Marah Kraus MD        Inhale contents of one capsule daily.   Quantity:  90 capsule   Refills:  3         Stop taking these medicines if you haven't already. Please contact your care team if you have questions.     amLODIPine 2.5 MG tablet   Commonly known as:  NORVASC   Stopped by:  Marah Kraus MD                Where to get your medicines      These medications were sent to [place] [id number] [facility] [address]  [address] [place] [address] 1-[facility] [address]    Hours:  TRANSPLANT PHONE NUMBER [phone] Phone:  [phone]     albuterol 108 (90 BASE) MCG/ACT Inhaler    atorvastatin 40 MG tablet    tiotropium 18 MCG capsule                Primary Care Provider Office Phone # Fax #    Montana Mcnulty [phone]       [address]  [facility] [address]        Equal Access to Services     BRANDY BENNETT AH: Hadii aad ku hadasho Soomaali, waaxda luqadaha, qaybta kaalmada adeegyada, rosaura ronquillo hayamann chloe franklin. So [facility] [phone].    ATENCIÓN: Si habla español, tiene a lee disposición servicios gratuitos de asistencia lingüística. Kaushal perez [phone].    We comply with applicable federal civil rights laws and [place] laws. We do not discriminate on the basis of race, color, national origin, age, disability sex, sexual orientation or gender identity.            Thank you!     Thank you for  [facility]  for your care. Our goal is always to provide you with excellent care. Hearing back from our patients is one way we can continue to improve our services. Please take a few minutes to complete the written survey that you may receive in the mail after your visit with us. Thank you!             Your Updated Medication List - Protect others around you: Learn how to safely use, store and throw away your medicines at www.disposemymeds.org.          This list is accurate as of: 9/6/17 12:14 PM.  Always use your most recent med list.                   Brand Name Dispense Instructions for use Diagnosis    acetaminophen 325 MG tablet    TYLENOL     Take 325-650 mg by mouth every 6 hours as needed for mild pain        albuterol 108 (90 BASE) MCG/ACT Inhaler    PROAIR HFA/PROVENTIL HFA/VENTOLIN HFA    1 Inhaler    Inhale 2 puffs into the lungs every 6 hours as needed for shortness of breath / dyspnea or wheezing    Chronic obstructive pulmonary disease, unspecified COPD type (H)       atorvastatin 40 MG tablet    LIPITOR    90 tablet    Take 1 tablet (40 mg) by mouth daily    Hypercholesteremia       calcium carbonate 500 MG chewable tablet    TUMS     Take 2 chew tab by mouth 2 times daily as needed for heartburn        ibuprofen 400 MG tablet    ADVIL/MOTRIN     Take 400 mg by mouth every 6 hours as needed for moderate pain        nicotine 21 MG/24HR 24 hr patch    NICODERM CQ    28 patch    Place 1 patch onto the skin every 24 hours    Current smoker       nicotine polacrilex 2 MG gum    NICORETTE    240 each    Place 1 each (2 mg) inside cheek as needed for smoking cessation Place 1 each inside cheek as needed for smoking cessation    Current smoker       tiotropium 18 MCG capsule    SPIRIVA HANDIHALER    90 capsule    Inhale contents of one capsule daily.    Chronic obstructive pulmonary disease, unspecified COPD type (H)

## 2017-09-08 ENCOUNTER — TELEPHONE (OUTPATIENT)
Dept: PHARMACY | Facility: CLINIC | Age: 67
End: 2017-09-08

## 2017-09-08 NOTE — TELEPHONE ENCOUNTER
Pt wife called and left message to say he cannot make upcoming appointment with me. She reports that pt has been without cigarettes for 4 days (calculated quit date 9/5/17) and intends to continue to remain smoke-free. He has not been using the patch because he is worried that it is too strong but feels the nicotine gum is working well for him. I called back and left a message with her saying I would cancel the appointment and encouraging her to call and schedule if they need anything in the future.    Mora Carter, SilviaD  Medication Therapy Management Provider  Phone:[phone]  Pager: [phone]

## 2017-10-12 ENCOUNTER — TELEPHONE (OUTPATIENT)
Dept: INTERNAL MEDICINE | Facility: CLINIC | Age: 67
End: 2017-10-12

## 2017-10-12 NOTE — TELEPHONE ENCOUNTER
"----- Message from [place] Mcnulty MD sent at 9/28/2017 10:13 AM CDT -----  Regarding: results  Loren Larson,    I tried to call patient's wife but was not able to reach her. Could you call her and let her know:    \"Your  does not have an enlarged blood vessel in his abdomen. He should continue to attempt to quit smoking to reduce his risk\"    Thanks!  Montana  -----------------  Attempted to reach pt several times, unable to leave message.  Result letter was sent to pt by Mary Babin RN BSN  Marsha Vidal RN    "

## 2017-12-28 ENCOUNTER — TELEPHONE (OUTPATIENT)
Dept: PHARMACY | Facility: CLINIC | Age: 67
End: 2017-12-28

## 2017-12-28 NOTE — TELEPHONE ENCOUNTER
Called and left message to schedule MTM follow up appointment.    Mora Carter PharmD  Medication Therapy Management Provider  Phone:[phone]  Pager: [phone]

## 2018-02-27 ENCOUNTER — TELEPHONE (OUTPATIENT)
Dept: PHARMACY | Facility: CLINIC | Age: 68
End: 2018-02-27

## 2018-02-27 NOTE — TELEPHONE ENCOUNTER
We have been unable to reach this patient for MTM follow-up after several attempts. We will stop reaching out to the patient at this time. Please let us know if we can assist in this patient's care in the future.    Routing to PCP as REHAN Moore, Pharm.D., [facility]  Medication Therapy Management Pharmacist  Page/VM:  [phone]

## 2018-04-23 ENCOUNTER — OFFICE VISIT (OUTPATIENT)
Dept: INTERNAL MEDICINE | Facility: CLINIC | Age: 68
End: 2018-04-23
Payer: COMMERCIAL

## 2018-04-23 VITALS
BODY MASS INDEX: 25.61 KG/M2 | DIASTOLIC BLOOD PRESSURE: 72 MMHG | HEART RATE: 68 BPM | WEIGHT: 168.4 LBS | SYSTOLIC BLOOD PRESSURE: 120 MMHG

## 2018-04-23 DIAGNOSIS — I25.10 CORONARY ARTERY DISEASE INVOLVING NATIVE CORONARY ARTERY OF NATIVE HEART WITHOUT ANGINA PECTORIS: ICD-10-CM

## 2018-04-23 DIAGNOSIS — I25.10 CORONARY ARTERY DISEASE INVOLVING NATIVE CORONARY ARTERY OF NATIVE HEART WITHOUT ANGINA PECTORIS: Primary | ICD-10-CM

## 2018-04-23 LAB
ANION GAP SERPL CALCULATED.3IONS-SCNC: 6 MMOL/L (ref 3–14)
BUN SERPL-MCNC: 13 MG/DL (ref 7–30)
CALCIUM SERPL-MCNC: 8.8 MG/DL (ref 8.5–10.1)
CHLORIDE SERPL-SCNC: 111 MMOL/L (ref 94–109)
CHOLEST SERPL-MCNC: 205 MG/DL
CO2 SERPL-SCNC: 25 MMOL/L (ref 20–32)
CREAT SERPL-MCNC: 0.89 MG/DL (ref 0.66–1.25)
GFR SERPL CREATININE-BSD FRML MDRD: 85 ML/MIN/1.7M2
GLUCOSE SERPL-MCNC: 86 MG/DL (ref 70–99)
HDLC SERPL-MCNC: 38 MG/DL
LDLC SERPL CALC-MCNC: 144 MG/DL
NONHDLC SERPL-MCNC: 167 MG/DL
POTASSIUM SERPL-SCNC: 4 MMOL/L (ref 3.4–5.3)
SODIUM SERPL-SCNC: 142 MMOL/L (ref 133–144)
TRIGL SERPL-MCNC: 115 MG/DL

## 2018-04-23 RX ORDER — ATORVASTATIN CALCIUM 40 MG/1
40 TABLET, FILM COATED ORAL DAILY
Qty: 100 TABLET | Refills: 3 | Status: SHIPPED | OUTPATIENT
Start: 2018-04-23 | End: 2019-06-18

## 2018-04-23 ASSESSMENT — PAIN SCALES - GENERAL: PAINLEVEL: NO PAIN (0)

## 2018-04-23 NOTE — PROGRESS NOTES
The 10-year ASCVD risk score ([place] KAT Yanez, et al., 2013) is: 15.7%    Values used to calculate the score:      Age: 68 years      Sex: Male      Is Non- : No      Diabetic: No      Tobacco smoker: No      Systolic Blood Pressure: 120 mmHg      Is BP treated: No      HDL Cholesterol: 46 mg/dL      Total Cholesterol: 197 mg/dL    HPI  68-year-old returns today with his wife who serves as .  He had successfully quit smoking and then return to [place] for 3 months.  In [place] he resumed smoking.  He also had a CT coronary angiogram there showing extensive coronary artery calcification and some stenosis.  He has had no associated chest pain dyspnea dizziness lightheadedness palpitations or exertional symptoms.  He is exercising on the treadmill regularly tolerating this well without symptoms or problems.  He is continuing to smoke but is motivated to quit.  He successfully used Nicorette gum in the past and reportedly has this on hand at home.  He will resume his smoking cessation efforts.  We discussed the significance of his coronary calcium and angiography.  The absence of symptoms he needs aggressive risk factor modification.  His ASCVD risk is 15.7% and is not on statin.  We discussed using the statin and aspirin for this and they agreed.  Otherwise is doing well with no other symptoms or problems.    Past and Family hx reviewed and updated'    Past Medical History:   Diagnosis Date     Gastric ulcer      Hyperlipidemia      Smoker      Past Surgical History:   Procedure Laterality Date     ESOPHAGOSCOPY, GASTROSCOPY, DUODENOSCOPY (EGD), COMBINED  6/30/2014    Procedure: COMBINED ESOPHAGOSCOPY, GASTROSCOPY, DUODENOSCOPY (EGD);  Surgeon: Stone Wan MD;  Location: [facility]     NO HISTORY OF SURGERY       Family History   Problem Relation Age of Onset     Hypertension Father      sibling     HEART DISEASE Father      Social History     Social History     Marital status:      Spouse  name: N/A     Number of children: N/A     Years of education: N/A     Social History Main Topics     Smoking status: Former Smoker     Packs/day: 1.00     Types: Cigarettes     Quit date: 9/5/2017     Smokeless tobacco: Never Used      Comment: 40 - 80 pack year history.  Smoked up to two packs per day. Trying to cut back     Alcohol use No     Drug use: No     Sexual activity: Not Asked     Other Topics Concern     None     Social History Narrative     Complete review of symptoms negative except as noted above.    Exam:  /72  Pulse 68  Wt 76.4 kg (168 lb 6.4 oz)  BMI 25.61 kg/m2  168 lbs 6.4 oz  The patient is alert, oriented with a clear sensorium.   Skin shows no lesions or rashes and good turgor.   Head is normocephalic and atraumatic.    Neck shows no nodes, thyromegaly.     Lungs are clear.   Heart shows normal S1 and S2 without murmur or gallop.    Extremities show no edema.    ASSESSMENT  1 coronary artery disease with elevated coronary artery calcium  2 hyperlipidemia needs treatment  3 smoker    Plan  We will start him on atorvastatin discussed the risk and benefits.  We will start him on low-dose aspirin 81 mg daily.  Encouraged him to exercise resuming Nicorette gum and quit smoking.  Will check his lipids and BMP today and have him follow-up for reassessment in 6 months or immediately if there is any symptoms or problems    This note was completed using Dragon voice recognition software.  Although reviewed after completion, some word and grammatical errors may occur.    Paulino Harper MD  General Internal Medicine  Primary Care Center  [phone]

## 2018-04-23 NOTE — PATIENT INSTRUCTIONS
[facility]: [phone]     [facility] Medication Refill Request Information:  * Please contact your pharmacy regarding ANY request for medication refills.  ** [facility] = [phone]  * Please allow 3 business days for routine medication refills.  * Please allow 5 business days for controlled substance medication refills.     [facility] Test Result notification information:  *You will be notified with in 7-10 days of your appointment day regarding the results of your test.  If you are on MyChart you will be notified as soon as the provider has reviewed the results and signed off on them.

## 2018-04-23 NOTE — NURSING NOTE
Chief Complaint   Patient presents with     Heart Problem     Patient here for heart problems.       Monse Green CMA at 3:33 PM on 4/23/2018.

## 2018-04-23 NOTE — MR AVS SNAPSHOT
After Visit Summary   4/23/2018    Silke Moralez    MRN: [id number]           Patient Information     Date Of Birth          1950        Visit Information        Provider Department      4/23/2018 3:40 PM Paulino Harper MD [facility]        Today's Diagnoses     Coronary artery disease involving native coronary artery of native heart without angina pectoris    -  1      Care Instructions    Primary Care Center: [phone]     Primary Care Center Medication Refill Request Information:  * Please contact your pharmacy regarding ANY request for medication refills.  ** [facility] Prescription Fax = [phone]  * Please allow 3 business days for routine medication refills.  * Please allow 5 business days for controlled substance medication refills.     Primary Care Center Test Result notification information:  *You will be notified with in 7-10 days of your appointment day regarding the results of your test.  If you are on MyChart you will be notified as soon as the provider has reviewed the results and signed off on them.            Follow-ups after your visit        Follow-up notes from your care team     Return in about 6 months (around 10/23/2018).      Future tests that were ordered for you today     Open Future Orders        Priority Expected Expires Ordered    Lipid Profile Routine  4/23/2019 4/23/2018    Basic metabolic panel Routine  4/23/2019 4/23/2018            Who to contact     Please call your clinic at [phone] to:    Ask questions about your health    Make or cancel appointments    Discuss your medicines    Learn about your test results    Speak to your doctor            Additional Information About Your Visit        MyChart Information     [employer] is an electronic gateway that provides easy, online access to your medical records. With [employer], you can request a clinic appointment, read your test results, renew a prescription or communicate with your care  team.     To sign up for [employer] visit the website at [email or web address]   You will be asked to enter the access code listed below, as well as some personal information. Please follow the directions to create your username and password.     Your access code is: [email or web address]  Expires: 2018  6:31 AM     Your access code will  in 90 days. If you need help or a new code, please contact your [facility] or call [phone] for assistance.        Care EveryWhere ID     This is your Care EveryWhere ID. This could be used by other organizations to access your [place] medical records  [id number]        Your Vitals Were     Pulse BMI (Body Mass Index)                68 25.61 kg/m2           Blood Pressure from Last 3 Encounters:   18 120/72   17 122/75   17 109/72    Weight from Last 3 Encounters:   18 76.4 kg (168 lb 6.4 oz)   17 77.3 kg (170 lb 8 oz)   17 78 kg (172 lb)                 Today's Medication Changes          These changes are accurate as of 18  4:06 PM.  If you have any questions, ask your nurse or doctor.               These medicines have changed or have updated prescriptions.        Dose/Directions    * atorvastatin 40 MG tablet   Commonly known as:  LIPITOR   This may have changed:  Another medication with the same name was added. Make sure you understand how and when to take each.   Used for:  Hypercholesteremia   Changed by:  Paulino Harper MD        Dose:  40 mg   Take 1 tablet (40 mg) by mouth daily   Quantity:  90 tablet   Refills:  3       * atorvastatin 40 MG tablet   Commonly known as:  LIPITOR   This may have changed:  You were already taking a medication with the same name, and this prescription was added. Make sure you understand how and when to take each.   Used for:  Coronary artery disease involving native coronary artery of native heart without angina pectoris   Changed by:  Leroy  Paulino Mayorga MD        Dose:  40 mg   Take 1 tablet (40 mg) by mouth daily   Quantity:  100 tablet   Refills:  3       * Notice:  This list has 2 medication(s) that are the same as other medications prescribed for you. Read the directions carefully, and ask your doctor or other care provider to review them with you.         Where to get your medicines      These medications were sent to [facility] [place], [place] - [address]  [address], [facility] [address]    Hours:  TRANSPLANT PHONE NUMBER [phone] Phone:  [phone]     atorvastatin 40 MG tablet                Primary Care Provider Office Phone # Fax #    Paulino Harper [phone]       [address] [place] [address] [address]  [facility] [address]        Equal Access to Services     BRANDY BENNETT AH: Hadii aad ku hadasho Sorafat, waaxda luqadaha, qaybta kaalmada adeegyada, rosaura franklin. So [facility] [phone].    ATENCIÓN: Si habla español, tiene a lee disposición servicios gratuitos de asistencia lingüística. Kaushal al [phone].    We comply with applicable federal civil rights laws and [place] laws. We do not discriminate on the basis of race, color, national origin, age, disability, sex, sexual orientation, or gender identity.            Thank you!     Thank you for choosing [facility]ARE [facility]  for your care. Our goal is always to provide you with excellent care. Hearing back from our patients is one way we can continue to improve our services. Please take a few minutes to complete the written survey that you may receive in the mail after your visit with us. Thank you!             Your Updated Medication List - Protect others around you: Learn how to safely use, store and throw away your medicines at www.disposemymeds.org.          This list is accurate as of 4/23/18  4:06 PM.  Always use your most recent med list.                   Brand  Name Dispense Instructions for use Diagnosis    acetaminophen 325 MG tablet    TYLENOL     Take 325-650 mg by mouth every 6 hours as needed for mild pain        albuterol 108 (90 Base) MCG/ACT Inhaler    PROAIR HFA/PROVENTIL HFA/VENTOLIN HFA    1 Inhaler    Inhale 2 puffs into the lungs every 6 hours as needed for shortness of breath / dyspnea or wheezing    Chronic obstructive pulmonary disease, unspecified COPD type (H)       aspirin 81 MG EC tablet     90 tablet    Take 1 tablet (81 mg) by mouth daily    Coronary artery disease involving native coronary artery of native heart without angina pectoris       * atorvastatin 40 MG tablet    LIPITOR    90 tablet    Take 1 tablet (40 mg) by mouth daily    Hypercholesteremia       * atorvastatin 40 MG tablet    LIPITOR    100 tablet    Take 1 tablet (40 mg) by mouth daily    Coronary artery disease involving native coronary artery of native heart without angina pectoris       calcium carbonate 500 MG chewable tablet    TUMS     Take 2 chew tab by mouth 2 times daily as needed for heartburn        ibuprofen 400 MG tablet    ADVIL/MOTRIN     Take 400 mg by mouth every 6 hours as needed for moderate pain        nicotine 21 MG/24HR 24 hr patch    NICODERM CQ    28 patch    Place 1 patch onto the skin every 24 hours    Current smoker       nicotine polacrilex 2 MG gum    NICORETTE    240 each    Place 1 each (2 mg) inside cheek as needed for smoking cessation Place 1 each inside cheek as needed for smoking cessation    Current smoker       tiotropium 18 MCG capsule    SPIRIVA HANDIHALER    90 capsule    Inhale contents of one capsule daily.    Chronic obstructive pulmonary disease, unspecified COPD type (H)       * Notice:  This list has 2 medication(s) that are the same as other medications prescribed for you. Read the directions carefully, and ask your doctor or other care provider to review them with you.

## 2019-05-30 ENCOUNTER — DOCUMENTATION ONLY (OUTPATIENT)
Dept: CARE COORDINATION | Facility: CLINIC | Age: 69
End: 2019-05-30

## 2019-06-18 ENCOUNTER — OFFICE VISIT (OUTPATIENT)
Dept: INTERNAL MEDICINE | Facility: CLINIC | Age: 69
End: 2019-06-18
Payer: COMMERCIAL

## 2019-06-18 VITALS
OXYGEN SATURATION: 97 % | BODY MASS INDEX: 26.68 KG/M2 | DIASTOLIC BLOOD PRESSURE: 72 MMHG | RESPIRATION RATE: 20 BRPM | WEIGHT: 175.5 LBS | SYSTOLIC BLOOD PRESSURE: 125 MMHG | HEART RATE: 77 BPM

## 2019-06-18 DIAGNOSIS — I25.10 CORONARY ARTERY DISEASE INVOLVING NATIVE CORONARY ARTERY OF NATIVE HEART WITHOUT ANGINA PECTORIS: Primary | ICD-10-CM

## 2019-06-18 DIAGNOSIS — K21.00 GASTROESOPHAGEAL REFLUX DISEASE WITH ESOPHAGITIS: ICD-10-CM

## 2019-06-18 DIAGNOSIS — E78.00 HYPERCHOLESTEREMIA: ICD-10-CM

## 2019-06-18 RX ORDER — ATORVASTATIN CALCIUM 40 MG/1
40 TABLET, FILM COATED ORAL DAILY
Qty: 100 TABLET | Refills: 3 | Status: SHIPPED | OUTPATIENT
Start: 2019-06-18 | End: 2020-03-11

## 2019-06-18 RX ORDER — OMEPRAZOLE 40 MG/1
40 CAPSULE, DELAYED RELEASE ORAL
Qty: 100 CAPSULE | Refills: 3 | Status: SHIPPED | OUTPATIENT
Start: 2019-06-18 | End: 2020-05-19

## 2019-06-18 RX ORDER — METOPROLOL SUCCINATE 25 MG/1
25 TABLET, EXTENDED RELEASE ORAL DAILY
Qty: 100 TABLET | Refills: 3 | Status: SHIPPED | OUTPATIENT
Start: 2019-06-18 | End: 2019-08-01

## 2019-06-18 RX ORDER — AMLODIPINE BESYLATE 2.5 MG/1
2.5 TABLET ORAL DAILY
COMMUNITY
Start: 2019-06-18 | End: 2019-10-02

## 2019-06-18 ASSESSMENT — PAIN SCALES - GENERAL: PAINLEVEL: NO PAIN (0)

## 2019-06-18 NOTE — PROGRESS NOTES
HPI  69-year-old was visiting in [place] on May 4 when he suffered persistent sustained chest pain.  He is seen in the emergency room where cardiac enzymes were elevated and a CT angiogram showed evidence of 95% occlusion in a diagonal branch.  He subsequently underwent angioplasty but the vessel was felt to be too small to stent.  He was discharged on beta-blocker ticagrelor, amlodipine and continued on his atorvastatin.  Since discharge he has had no chest pain he has been reluctant to precipitate in physical activity because of concern regarding how much he could accomplish.  Is also had some reflux esophagitis type symptoms and increased heartburn.  Said no associated difficulty swallowing no dizziness lightheadedness or other complaints.  Said no problems on his present medication although they were recording low blood pressures so they stopped the beta-blocker for period of time.  His blood pressure today is off the beta-blocker.  Otherwise he is feeling fine.  He does have his angiogram films from [place] with him.  Past Medical History:   Diagnosis Date     Gastric ulcer      Hyperlipidemia      Smoker      Past Surgical History:   Procedure Laterality Date     ESOPHAGOSCOPY, GASTROSCOPY, DUODENOSCOPY (EGD), COMBINED  6/30/2014    Procedure: COMBINED ESOPHAGOSCOPY, GASTROSCOPY, DUODENOSCOPY (EGD);  Surgeon: Stone Wan MD;  Location: [facility]     NO HISTORY OF SURGERY       Family History   Problem Relation Age of Onset     Hypertension Father         sibling     Heart Disease Father      Social History     Socioeconomic History     Marital status:      Spouse name: None     Number of children: None     Years of education: None     Highest education level: None   Occupational History     None   Social Needs     Financial resource strain: None     Food insecurity:     Worry: None     Inability: None     Transportation needs:     Medical: None     Non-medical: None   Tobacco Use     Smoking status: Former  Smoker     Packs/day: 1.00     Types: Cigarettes     Last attempt to quit: 2017     Years since quittin.7     Smokeless tobacco: Never Used     Tobacco comment: 40 - 80 pack year history.  Smoked up to two packs per day. Trying to cut back   Substance and Sexual Activity     Alcohol use: No     Drug use: No     Sexual activity: None   Lifestyle     Physical activity:     Days per week: None     Minutes per session: None     Stress: None   Relationships     Social connections:     Talks on phone: None     Gets together: None     Attends Quaker service: None     Active member of club or organization: None     Attends meetings of clubs or organizations: None     Relationship status: None     Intimate partner violence:     Fear of current or ex partner: None     Emotionally abused: None     Physically abused: None     Forced sexual activity: None   Other Topics Concern     Parent/sibling w/ CABG, MI or angioplasty before 65F 55M? Not Asked   Social History Narrative     None       Exam:  /72 (BP Location: Right arm, Patient Position: Sitting, Cuff Size: Adult Regular)   Pulse 77   Resp 20   Wt 79.6 kg (175 lb 8 oz)   SpO2 97%   BMI 26.68 kg/m    175 lbs 8 oz  The patient is alert, oriented with a clear sensorium.   Skin shows no lesions or rashes and good turgor.   Head is normocephalic and atraumatic.      Lungs are clear.   Heart shows normal S1 and S2 without murmur or gallop.      ASSESSMENT  1 status post myocardial infarction single-vessel disease presently asymptomatic  2 hyperlipidemia on atorvastatin needs reassessment  3 hypertension appears well controlled  4 former smoker has now quit  5 GERD    Plan  We will have him resume metoprolol succinate 25 mg daily.  We will continue his other medications as before.  We will reassess his labs fasting.  We will refer him to cardiac rehabilitation and have him see cardiology here for their review of his angiogram and recommendations.  Over 25  minutes spent with patient the majority in counseling and coordinating care.    This note was completed using Dragon voice recognition software.  Although reviewed after completion, some word and grammatical errors may occur.    Paulino Harper MD  General Internal Medicine  [facility]  [phone]

## 2019-06-18 NOTE — NURSING NOTE
Chief Complaint   Patient presents with     Heart Problem     while in [place] in May( 2019) was hospitalized with heart attack,    Joleen Castillo LPN 9:12 AM on 6/18/2019

## 2019-06-19 DIAGNOSIS — E78.00 HYPERCHOLESTEREMIA: ICD-10-CM

## 2019-06-19 DIAGNOSIS — I25.10 CORONARY ARTERY DISEASE INVOLVING NATIVE CORONARY ARTERY OF NATIVE HEART WITHOUT ANGINA PECTORIS: ICD-10-CM

## 2019-06-19 LAB
ALBUMIN SERPL-MCNC: 4.1 G/DL (ref 3.4–5)
ALP SERPL-CCNC: 79 U/L (ref 40–150)
ALT SERPL W P-5'-P-CCNC: 24 U/L (ref 0–70)
ANION GAP SERPL CALCULATED.3IONS-SCNC: 5 MMOL/L (ref 3–14)
AST SERPL W P-5'-P-CCNC: 20 U/L (ref 0–45)
BASOPHILS # BLD AUTO: 0 10E9/L (ref 0–0.2)
BASOPHILS NFR BLD AUTO: 0.6 %
BILIRUB SERPL-MCNC: 0.8 MG/DL (ref 0.2–1.3)
BUN SERPL-MCNC: 20 MG/DL (ref 7–30)
CALCIUM SERPL-MCNC: 9 MG/DL (ref 8.5–10.1)
CHLORIDE SERPL-SCNC: 110 MMOL/L (ref 94–109)
CHOLEST SERPL-MCNC: 130 MG/DL
CO2 SERPL-SCNC: 26 MMOL/L (ref 20–32)
CREAT SERPL-MCNC: 0.87 MG/DL (ref 0.66–1.25)
DIFFERENTIAL METHOD BLD: ABNORMAL
EOSINOPHIL # BLD AUTO: 0.2 10E9/L (ref 0–0.7)
EOSINOPHIL NFR BLD AUTO: 2.9 %
ERYTHROCYTE [DISTWIDTH] IN BLOOD BY AUTOMATED COUNT: 11.8 % (ref 10–15)
GFR SERPL CREATININE-BSD FRML MDRD: 87 ML/MIN/{1.73_M2}
GLUCOSE SERPL-MCNC: 85 MG/DL (ref 70–99)
HCT VFR BLD AUTO: 42.5 % (ref 40–53)
HDLC SERPL-MCNC: 56 MG/DL
HGB BLD-MCNC: 14.2 G/DL (ref 13.3–17.7)
IMM GRANULOCYTES # BLD: 0 10E9/L (ref 0–0.4)
IMM GRANULOCYTES NFR BLD: 0.6 %
LDLC SERPL CALC-MCNC: 61 MG/DL
LYMPHOCYTES # BLD AUTO: 2.4 10E9/L (ref 0.8–5.3)
LYMPHOCYTES NFR BLD AUTO: 46.6 %
MCH RBC QN AUTO: 33 PG (ref 26.5–33)
MCHC RBC AUTO-ENTMCNC: 33.4 G/DL (ref 31.5–36.5)
MCV RBC AUTO: 99 FL (ref 78–100)
MONOCYTES # BLD AUTO: 0.5 10E9/L (ref 0–1.3)
MONOCYTES NFR BLD AUTO: 8.7 %
NEUTROPHILS # BLD AUTO: 2.1 10E9/L (ref 1.6–8.3)
NEUTROPHILS NFR BLD AUTO: 40.6 %
NONHDLC SERPL-MCNC: 74 MG/DL
NRBC # BLD AUTO: 0 10*3/UL
NRBC BLD AUTO-RTO: 0 /100
PLATELET # BLD AUTO: 142 10E9/L (ref 150–450)
POTASSIUM SERPL-SCNC: 4.3 MMOL/L (ref 3.4–5.3)
PROT SERPL-MCNC: 7.9 G/DL (ref 6.8–8.8)
RBC # BLD AUTO: 4.3 10E12/L (ref 4.4–5.9)
SODIUM SERPL-SCNC: 141 MMOL/L (ref 133–144)
TRIGL SERPL-MCNC: 64 MG/DL
TSH SERPL DL<=0.005 MIU/L-ACNC: 1.54 MU/L (ref 0.4–4)
WBC # BLD AUTO: 5.2 10E9/L (ref 4–11)

## 2019-06-21 NOTE — TELEPHONE ENCOUNTER
RECORDS RECEIVED FROM: Internal/external   DATE RECEIVED: 7-1   NOTES STATUS DETAILS   OFFICE NOTE from referring provider    Internal    OFFICE NOTE from other cardiologist    N/A    DISCHARGE SUMMARY from hospital    Received    DISCHARGE REPORT from the ER   N/A    OPERATIVE REPORT    N/A    MEDICATION LIST   Internal    LABS     BMP   Internal 4-23-18   CBC   Internal 6-19-19   CMP   Internal 6-19-19   Lipids   Internal 6-19-19   TSH   Internal 6-19-19   DIAGNOSTIC PROCEDURES     EKG   N/A    Monitor Reports   N/A    IMAGING (DISC & REPORT)      Echo   N/A    Stress Tests   N/A    Cath   N/A    MRI/MRA   N/A    CT/CTA   N/A      Action    Action Taken 6-24: Per notes, has records from [place] too

## 2019-07-01 ENCOUNTER — OFFICE VISIT (OUTPATIENT)
Dept: CARDIOLOGY | Facility: CLINIC | Age: 69
End: 2019-07-01
Attending: INTERNAL MEDICINE
Payer: COMMERCIAL

## 2019-07-01 ENCOUNTER — PRE VISIT (OUTPATIENT)
Dept: CARDIOLOGY | Facility: CLINIC | Age: 69
End: 2019-07-01

## 2019-07-01 VITALS
HEIGHT: 67 IN | BODY MASS INDEX: 27.48 KG/M2 | WEIGHT: 175.1 LBS | DIASTOLIC BLOOD PRESSURE: 75 MMHG | SYSTOLIC BLOOD PRESSURE: 120 MMHG | OXYGEN SATURATION: 97 % | HEART RATE: 86 BPM

## 2019-07-01 DIAGNOSIS — I25.10 CORONARY ARTERY DISEASE INVOLVING NATIVE CORONARY ARTERY OF NATIVE HEART WITHOUT ANGINA PECTORIS: ICD-10-CM

## 2019-07-01 DIAGNOSIS — I21.4 NSTEMI (NON-ST ELEVATED MYOCARDIAL INFARCTION) (H): ICD-10-CM

## 2019-07-01 PROCEDURE — 99204 OFFICE O/P NEW MOD 45 MIN: CPT | Mod: GC | Performed by: INTERNAL MEDICINE

## 2019-07-01 PROCEDURE — G0463 HOSPITAL OUTPT CLINIC VISIT: HCPCS | Mod: ZF

## 2019-07-01 RX ADMIN — Medication 3 ML: at 14:45

## 2019-07-01 ASSESSMENT — MIFFLIN-ST. JEOR: SCORE: 1512.88

## 2019-07-01 ASSESSMENT — PAIN SCALES - GENERAL: PAINLEVEL: NO PAIN (0)

## 2019-07-01 NOTE — PATIENT INSTRUCTIONS
Patient Instructions:  It was a pleasure to see you in the cardiology clinic today.      If you have any questions, call  Ele Gutierres RN, at [phone].  Press Option #1 for the [facility], and then press Option #4 for nursing.  We are encouraging the use of Logical Appshart to communicate with your HealthCare Provider    Note the new medications: none  Stop the following medications: none    The results from today include: pending ECHO  Please follow up with Dr. Yeison Zhu in one year      If you have an urgent need after hours (8:00 am to 4:30 pm) please call [phone] and ask for the cardiology fellow on call.

## 2019-07-01 NOTE — NURSING NOTE
Chief Complaint   Patient presents with     New Patient     establish care with cardiologist, manage coronary artery disease involving native vessel     Vitals were taken and medications reconciled.     Landon Ham CMA  12:00 PM

## 2019-07-01 NOTE — LETTER
7/1/2019      RE: Silke Moralez  [address]  [place] [facility] [address]       Dear Colleague,    Thank you for the opportunity to participate in the care of your patient, Silke Moralez, at the [facility]. Please see a copy of my visit note below.      Cardiology Consultation     Silke Moralez MRN# [id number]       Reason for consult: Coronary artery disease    HPI: Silke Moralez is a 69 year old gentleman who with known diagnosis of coronary artery disease who was traveling to [place] on May 4 when he experienced chest pain.  He went to the emergency department there and was found to have positive enzymes.  He was diagnosed with an NSTEMI taken to the coronary catheterization laboratory where he was found to have significant disease in a diagonal vessel however this was felt to be too small for PCI and he underwent POBA of the lesion.  He was placed on Toprol, aspirin, Brilinta, Lipitor, Norvasc.  He followed up with his primary care physician locally here upon return.  He had not been very active given concern for recurrence of his symptoms although he had had no evidence of any resting or unstable angina.  He was referred to cardiac rehab.  At this time he denies any chest pain, shortness of breath, palpitations, fevers chills nausea vomiting or abdominal pain.  He denies any symptoms of PAD.  He denies any bleeding.  He is compliant with his medications including his dual antiplatelet therapy.  He has checked his blood pressure and it has been okay off of Norvasc.    Past Medical History:  Past Medical History:   Diagnosis Date     Gastric ulcer      Hyperlipidemia      Smoker        Past Surgical History:  Past Surgical History:   Procedure Laterality Date     ESOPHAGOSCOPY, GASTROSCOPY, DUODENOSCOPY (EGD), COMBINED  6/30/2014    Procedure: COMBINED ESOPHAGOSCOPY, GASTROSCOPY, DUODENOSCOPY (EGD);  Surgeon: Stone Wan MD;  Location:  GI     NO HISTORY  OF SURGERY       Allergies:   No Known Allergies    Medications:     Current Outpatient Medications on File Prior to Visit:  acetaminophen (TYLENOL) 325 MG tablet Take 325-650 mg by mouth every 6 hours as needed for mild pain   albuterol (PROAIR HFA/PROVENTIL HFA/VENTOLIN HFA) 108 (90 BASE) MCG/ACT Inhaler Inhale 2 puffs into the lungs every 6 hours as needed for shortness of breath / dyspnea or wheezing   amLODIPine (NORVASC) 2.5 MG tablet Take 1 tablet (2.5 mg) by mouth daily   aspirin 81 MG EC tablet Take 1 tablet (81 mg) by mouth daily   atorvastatin (LIPITOR) 40 MG tablet Take 1 tablet (40 mg) by mouth daily   atorvastatin (LIPITOR) 40 MG tablet Take 1 tablet (40 mg) by mouth daily   calcium carbonate (TUMS) 500 MG chewable tablet Take 2 chew tab by mouth 2 times daily as needed for heartburn   ibuprofen (ADVIL/MOTRIN) 400 MG tablet Take 400 mg by mouth every 6 hours as needed for moderate pain   metoprolol succinate ER (TOPROL-XL) 25 MG 24 hr tablet Take 1 tablet (25 mg) by mouth daily   nicotine (NICODERM CQ) 21 MG/24HR 24 hr patch Place 1 patch onto the skin every 24 hours   nicotine polacrilex (NICORETTE) 2 MG gum Place 1 each (2 mg) inside cheek as needed for smoking cessation Place 1 each inside cheek as needed for smoking cessation   omeprazole (PRILOSEC) 40 MG DR capsule Take 1 capsule (40 mg) by mouth every morning (before breakfast)   ticagrelor (BRILINTA) 90 MG tablet Take 1 tablet (90 mg) by mouth 2 times daily   tiotropium (SPIRIVA HANDIHALER) 18 MCG capsule Inhale contents of one capsule daily.     No current facility-administered medications on file prior to visit.     Social History:  Social History     Socioeconomic History     Marital status:      Spouse name: Not on file     Number of children: Not on file     Years of education: Not on file     Highest education level: Not on file   Occupational History     Not on file   Social Needs     Financial resource strain: Not on file     Food  insecurity:     Worry: Not on file     Inability: Not on file     Transportation needs:     Medical: Not on file     Non-medical: Not on file   Tobacco Use     Smoking status: Former Smoker     Packs/day: 1.00     Types: Cigarettes     Last attempt to quit: 2017     Years since quittin.8     Smokeless tobacco: Never Used     Tobacco comment: 40 - 80 pack year history.  Smoked up to two packs per day. Trying to cut back   Substance and Sexual Activity     Alcohol use: No     Drug use: No     Sexual activity: Not on file   Lifestyle     Physical activity:     Days per week: Not on file     Minutes per session: Not on file     Stress: Not on file   Relationships     Social connections:     Talks on phone: Not on file     Gets together: Not on file     Attends Orthodoxy service: Not on file     Active member of club or organization: Not on file     Attends meetings of clubs or organizations: Not on file     Relationship status: Not on file     Intimate partner violence:     Fear of current or ex partner: Not on file     Emotionally abused: Not on file     Physically abused: Not on file     Forced sexual activity: Not on file   Other Topics Concern     Parent/sibling w/ CABG, MI or angioplasty before 65F 55M? Not Asked   Social History Narrative     Not on file       Family History:  Family History   Problem Relation Age of Onset     Hypertension Father         sibling     Heart Disease Father        ROS:  Thorough 14 point review of systems was obtained from patient with pertinent positives and negatives as detailed above HPI.  All others were assessed and otherwise negative or noncontributory.    Exam:  Pulse:  [86] 86  BP: (120)/(75) 120/75  SpO2:  [97 %] 97 %  General: Alert, interactive, NAD  Eyes: sclera anicteric, EOMI  Neck: JVP without elevation, carotid 2+ bilaterally  Cardiovascular: regular rate and rhythm, normal S1 and S2, no murmurs, gallops, or rubs  Resp: clear to auscultation bilaterally, no  rales, wheezes, or rhonchi  GI: Soft, nontender, nondistended. +BS.  No HSM or masses, no rebound or guarding.  Extremities: without edema, no cyanosis or clubbing, dorsalis pedis and posterior tibialis pulses 2+ bilaterally  Skin: Warm and dry, no jaundice or rash  Neuro: CN 2-12 intact, moves all extremities equally  Psych: Alert & oriented x 3    Data:  Lab Results   Component Value Date    TROPONIN <0.04 06/28/2007    TROPONIN <0.04 06/28/2007       Transthoracic echocardiogram:  Ordered today     Coronary angiogram:  Reviewed.      Assessment:   69-year-old gentleman presents for management of coronary artery disease after experiencing an STEMI on May 4, 2019 in [place] at which time he was found to have diagonal disease that was felt to be too small to place a stent so he underwent POBA and was placed on medical therapy which he is currently tolerating without any recurrence of his anginal symptoms and is tolerating cardiac rehabilitation well.    Recommendations:  - Continue ASA 81 mg daily for life  - Continue Brilinta for one year (through May 2020)  - Continue Lipitor 40 mg daily  - Continue Toprol 25 mg daily  - Agree with holding Norvasc 2.5 mg daily given normotension  - Continue with cardiac rehabilitation  - Transthoracic echocardiogram  - Continued smoking cessation    Follow up in one year, earlier as needed by the patient.    Patient seen and discussed with Dr. Audra Ordaz MD  Cardiology Fellow      ATTENDING ATTESTATION:   I personally examined and evaluated this patient on July 1, 2019.   I have reviewed today's vital signs, medications, labs, and imaging results. I have reviewed and edited, as necessary, the history, review of systems, physical examination, and assessment and plan. I discussed the patient with Dr. Ordaz and agree with the assessment and plan of care as documented in the note above.    Thank you for allowing us to take part in the care of this very pleasant patient.   Please do not hesitate to call if any further questions or concerns arise.    Yeison Zhu MD, PhD  Interventional/Critical Care Cardiology  [phone]    July 1, 2019

## 2019-07-01 NOTE — PROGRESS NOTES
Cardiology Consultation     Silek Moralez MRN# [id number]       Reason for consult: Coronary artery disease    HPI: Silke Moralez is a 69 year old gentleman who with known diagnosis of coronary artery disease who was traveling to [place] on May 4 when he experienced chest pain.  He went to the emergency department there and was found to have positive enzymes.  He was diagnosed with an NSTEMI taken to the coronary catheterization laboratory where he was found to have significant disease in a diagonal vessel however this was felt to be too small for PCI and he underwent POBA of the lesion.  He was placed on Toprol, aspirin, Brilinta, Lipitor, Norvasc.  He followed up with his primary care physician locally here upon return.  He had not been very active given concern for recurrence of his symptoms although he had had no evidence of any resting or unstable angina.  He was referred to cardiac rehab.  At this time he denies any chest pain, shortness of breath, palpitations, fevers chills nausea vomiting or abdominal pain.  He denies any symptoms of PAD.  He denies any bleeding.  He is compliant with his medications including his dual antiplatelet therapy.  He has checked his blood pressure and it has been okay off of Norvasc.    Past Medical History:  Past Medical History:   Diagnosis Date     Gastric ulcer      Hyperlipidemia      Smoker        Past Surgical History:  Past Surgical History:   Procedure Laterality Date     ESOPHAGOSCOPY, GASTROSCOPY, DUODENOSCOPY (EGD), COMBINED  6/30/2014    Procedure: COMBINED ESOPHAGOSCOPY, GASTROSCOPY, DUODENOSCOPY (EGD);  Surgeon: Stone Wan MD;  Location: [facility]     NO HISTORY OF SURGERY       Allergies:   No Known Allergies    Medications:     Current Outpatient Medications on File Prior to Visit:  acetaminophen (TYLENOL) 325 MG tablet Take 325-650 mg by mouth every 6 hours as needed for mild pain   albuterol (PROAIR HFA/PROVENTIL HFA/VENTOLIN HFA) 108 (90 BASE)  MCG/ACT Inhaler Inhale 2 puffs into the lungs every 6 hours as needed for shortness of breath / dyspnea or wheezing   amLODIPine (NORVASC) 2.5 MG tablet Take 1 tablet (2.5 mg) by mouth daily   aspirin 81 MG EC tablet Take 1 tablet (81 mg) by mouth daily   atorvastatin (LIPITOR) 40 MG tablet Take 1 tablet (40 mg) by mouth daily   atorvastatin (LIPITOR) 40 MG tablet Take 1 tablet (40 mg) by mouth daily   calcium carbonate (TUMS) 500 MG chewable tablet Take 2 chew tab by mouth 2 times daily as needed for heartburn   ibuprofen (ADVIL/MOTRIN) 400 MG tablet Take 400 mg by mouth every 6 hours as needed for moderate pain   metoprolol succinate ER (TOPROL-XL) 25 MG 24 hr tablet Take 1 tablet (25 mg) by mouth daily   nicotine (NICODERM CQ) 21 MG/24HR 24 hr patch Place 1 patch onto the skin every 24 hours   nicotine polacrilex (NICORETTE) 2 MG gum Place 1 each (2 mg) inside cheek as needed for smoking cessation Place 1 each inside cheek as needed for smoking cessation   omeprazole (PRILOSEC) 40 MG DR capsule Take 1 capsule (40 mg) by mouth every morning (before breakfast)   ticagrelor (BRILINTA) 90 MG tablet Take 1 tablet (90 mg) by mouth 2 times daily   tiotropium (SPIRIVA HANDIHALER) 18 MCG capsule Inhale contents of one capsule daily.     No current facility-administered medications on file prior to visit.     Social History:  Social History     Socioeconomic History     Marital status:      Spouse name: Not on file     Number of children: Not on file     Years of education: Not on file     Highest education level: Not on file   Occupational History     Not on file   Social Needs     Financial resource strain: Not on file     Food insecurity:     Worry: Not on file     Inability: Not on file     Transportation needs:     Medical: Not on file     Non-medical: Not on file   Tobacco Use     Smoking status: Former Smoker     Packs/day: 1.00     Types: Cigarettes     Last attempt to quit: 9/5/2017     Years since  quittin.8     Smokeless tobacco: Never Used     Tobacco comment: 40 - 80 pack year history.  Smoked up to two packs per day. Trying to cut back   Substance and Sexual Activity     Alcohol use: No     Drug use: No     Sexual activity: Not on file   Lifestyle     Physical activity:     Days per week: Not on file     Minutes per session: Not on file     Stress: Not on file   Relationships     Social connections:     Talks on phone: Not on file     Gets together: Not on file     Attends Christian service: Not on file     Active member of club or organization: Not on file     Attends meetings of clubs or organizations: Not on file     Relationship status: Not on file     Intimate partner violence:     Fear of current or ex partner: Not on file     Emotionally abused: Not on file     Physically abused: Not on file     Forced sexual activity: Not on file   Other Topics Concern     Parent/sibling w/ CABG, MI or angioplasty before 65F 55M? Not Asked   Social History Narrative     Not on file       Family History:  Family History   Problem Relation Age of Onset     Hypertension Father         sibling     Heart Disease Father        ROS:  Thorough 14 point review of systems was obtained from patient with pertinent positives and negatives as detailed above HPI.  All others were assessed and otherwise negative or noncontributory.    Exam:  Pulse:  [86] 86  BP: (120)/(75) 120/75  SpO2:  [97 %] 97 %  General: Alert, interactive, NAD  Eyes: sclera anicteric, EOMI  Neck: JVP without elevation, carotid 2+ bilaterally  Cardiovascular: regular rate and rhythm, normal S1 and S2, no murmurs, gallops, or rubs  Resp: clear to auscultation bilaterally, no rales, wheezes, or rhonchi  GI: Soft, nontender, nondistended. +BS.  No HSM or masses, no rebound or guarding.  Extremities: without edema, no cyanosis or clubbing, dorsalis pedis and posterior tibialis pulses 2+ bilaterally  Skin: Warm and dry, no jaundice or rash  Neuro: CN 2-12  intact, moves all extremities equally  Psych: Alert & oriented x 3    Data:  Lab Results   Component Value Date    TROPONIN <0.04 06/28/2007    TROPONIN <0.04 06/28/2007       Transthoracic echocardiogram:  Ordered today     Coronary angiogram:  Reviewed.      Assessment:   69-year-old gentleman presents for management of coronary artery disease after experiencing an STEMI on May 4, 2019 in [place] at which time he was found to have diagonal disease that was felt to be too small to place a stent so he underwent POBA and was placed on medical therapy which he is currently tolerating without any recurrence of his anginal symptoms and is tolerating cardiac rehabilitation well.    Recommendations:  - Continue ASA 81 mg daily for life  - Continue Brilinta for one year (through May 2020)  - Continue Lipitor 40 mg daily  - Continue Toprol 25 mg daily  - Agree with holding Norvasc 2.5 mg daily given normotension  - Continue with cardiac rehabilitation  - Transthoracic echocardiogram  - Continued smoking cessation    Follow up in one year, earlier as needed by the patient.    Patient seen and discussed with Dr. Audra Ordaz MD  Cardiology Fellow      ATTENDING ATTESTATION:   I personally examined and evaluated this patient on July 1, 2019.   I have reviewed today's vital signs, medications, labs, and imaging results. I have reviewed and edited, as necessary, the history, review of systems, physical examination, and assessment and plan. I discussed the patient with Dr. Ordaz and agree with the assessment and plan of care as documented in the note above.    Thank you for allowing us to take part in the care of this very pleasant patient.  Please do not hesitate to call if any further questions or concerns arise.    Yeison Zhu MD, PhD  Interventional/Critical Care Cardiology  [phone]    July 1, 2019

## 2019-07-30 ENCOUNTER — MYC MEDICAL ADVICE (OUTPATIENT)
Dept: CARDIOLOGY | Facility: CLINIC | Age: 69
End: 2019-07-30

## 2019-07-31 ENCOUNTER — MYC MEDICAL ADVICE (OUTPATIENT)
Dept: CARDIOLOGY | Facility: CLINIC | Age: 69
End: 2019-07-31

## 2019-08-01 ENCOUNTER — MYC MEDICAL ADVICE (OUTPATIENT)
Dept: CARDIOLOGY | Facility: CLINIC | Age: 69
End: 2019-08-01

## 2019-08-01 DIAGNOSIS — I25.10 CORONARY ARTERY DISEASE INVOLVING NATIVE CORONARY ARTERY OF NATIVE HEART WITHOUT ANGINA PECTORIS: ICD-10-CM

## 2019-08-01 RX ORDER — METOPROLOL SUCCINATE 25 MG/1
12.5 TABLET, EXTENDED RELEASE ORAL DAILY
Qty: 100 TABLET | Refills: 3 | Status: SHIPPED | OUTPATIENT
Start: 2019-08-01 | End: 2019-10-02 | Stop reason: ALTCHOICE

## 2019-08-01 NOTE — TELEPHONE ENCOUNTER
Patient was c/o feeling dizzy and lightheaded at times. He was going to stop his beta blocker but has agreed to take a 1/2 dose (12.5 mg) to see if it help alleviate the symptoms.

## 2019-10-02 ENCOUNTER — OFFICE VISIT (OUTPATIENT)
Dept: CARDIOLOGY | Facility: CLINIC | Age: 69
End: 2019-10-02
Attending: INTERNAL MEDICINE
Payer: COMMERCIAL

## 2019-10-02 VITALS
OXYGEN SATURATION: 97 % | HEIGHT: 68 IN | SYSTOLIC BLOOD PRESSURE: 137 MMHG | HEART RATE: 85 BPM | WEIGHT: 176.5 LBS | DIASTOLIC BLOOD PRESSURE: 73 MMHG | BODY MASS INDEX: 26.75 KG/M2

## 2019-10-02 DIAGNOSIS — I25.10 CORONARY ARTERY DISEASE INVOLVING NATIVE CORONARY ARTERY OF NATIVE HEART WITHOUT ANGINA PECTORIS: ICD-10-CM

## 2019-10-02 DIAGNOSIS — I21.4 NSTEMI (NON-ST ELEVATED MYOCARDIAL INFARCTION) (H): Primary | ICD-10-CM

## 2019-10-02 PROCEDURE — 99213 OFFICE O/P EST LOW 20 MIN: CPT | Mod: ZP | Performed by: INTERNAL MEDICINE

## 2019-10-02 PROCEDURE — G0463 HOSPITAL OUTPT CLINIC VISIT: HCPCS

## 2019-10-02 RX ORDER — AMLODIPINE BESYLATE 2.5 MG/1
2.5 TABLET ORAL DAILY
Qty: 90 TABLET | Refills: 3 | Status: SHIPPED | OUTPATIENT
Start: 2019-10-02 | End: 2020-11-25

## 2019-10-02 RX ORDER — METOPROLOL SUCCINATE 25 MG/1
25 TABLET, EXTENDED RELEASE ORAL DAILY
Qty: 90 TABLET | Refills: 3 | Status: SHIPPED | OUTPATIENT
Start: 2019-10-02 | End: 2020-03-11

## 2019-10-02 ASSESSMENT — PAIN SCALES - GENERAL: PAINLEVEL: NO PAIN (0)

## 2019-10-02 ASSESSMENT — MIFFLIN-ST. JEOR: SCORE: 1527.16

## 2019-10-02 NOTE — LETTER
10/2/2019      RE: Silke Moralez  [address]  [facility] [address]       Dear Colleague,    Thank you for the opportunity to participate in the care of your patient, Silke Moralez, at the [facility] HEART [facility]. Please see a copy of my visit note below.    CARDIOLOGY CLINIC FOLLOW UP    HPI: Silke Moralez is a 69 year old male, being seen today for recheck of CAD.     His cardiac history is notable for an NSTEMI while in [facility] [place] for which he underwent a POBA of the diagonal branch. He has since then been doing quite well and denies any chest pain, shortness of breath, orthopnea, PND, nausea, vomiting, lightheadedness, dizziness, or syncope.    He does report being very symptomatic with the metoprolol tartrate and has since then stopped it. He was started on a Chinese version of the metoprolol called Betaloc ZOK (which is just metoprolol succinate) and tolerated it quite well.     They also report that while hospitalized for his NSTEMI in [facility] he had a significant troponin elevation which based on his coronary angiogram images is not entirely consistent. It is possible that he had another diagnosis and the diagonal was just stable disease.     PAST MEDICAL HISTORY:  Past Medical History:   Diagnosis Date     Gastric ulcer      Hyperlipidemia      Smoker        CURRENT MEDICATIONS:  Current Outpatient Medications   Medication Sig Dispense Refill     aspirin 81 MG EC tablet Take 1 tablet (81 mg) by mouth daily 90 tablet 3     atorvastatin (LIPITOR) 40 MG tablet Take 1 tablet (40 mg) by mouth daily 100 tablet 3     metoprolol succinate ER (TOPROL-XL) 25 MG 24 hr tablet Take 0.5 tablets (12.5 mg) by mouth daily 100 tablet 3     omeprazole (PRILOSEC) 40 MG DR capsule Take 1 capsule (40 mg) by mouth every morning (before breakfast) 100 capsule 3     ticagrelor (BRILINTA) 90 MG tablet Take 1 tablet (90 mg) by mouth 2 times daily       UNABLE TO FIND 2.5 mg daily  MEDICATION NAME: Levamlodipine besylate       acetaminophen (TYLENOL) 325 MG tablet Take 325-650 mg by mouth every 6 hours as needed for mild pain       amLODIPine (NORVASC) 2.5 MG tablet Take 1 tablet (2.5 mg) by mouth daily       calcium carbonate (TUMS) 500 MG chewable tablet Take 2 chew tab by mouth 2 times daily as needed for heartburn       ibuprofen (ADVIL/MOTRIN) 400 MG tablet Take 400 mg by mouth every 6 hours as needed for moderate pain       tiotropium (SPIRIVA HANDIHALER) 18 MCG capsule Inhale contents of one capsule daily. (Patient not taking: Reported on 10/2/2019) 90 capsule 3       PAST SURGICAL HISTORY:  Past Surgical History:   Procedure Laterality Date     ESOPHAGOSCOPY, GASTROSCOPY, DUODENOSCOPY (EGD), COMBINED  2014    Procedure: COMBINED ESOPHAGOSCOPY, GASTROSCOPY, DUODENOSCOPY (EGD);  Surgeon: Stone Wan MD;  Location: [facility]     NO HISTORY OF SURGERY         ALLERGIES  Patient has no known allergies.    FAMILY HX:  Family History   Problem Relation Age of Onset     Hypertension Father         sibling     Heart Disease Father        SOCIAL HX:  Social History     Socioeconomic History     Marital status:      Spouse name: None     Number of children: None     Years of education: None     Highest education level: None   Occupational History     None   Social Needs     Financial resource strain: None     Food insecurity:     Worry: None     Inability: None     Transportation needs:     Medical: None     Non-medical: None   Tobacco Use     Smoking status: Former Smoker     Packs/day: 1.00     Types: Cigarettes     Last attempt to quit: 2017     Years since quittin.0     Smokeless tobacco: Never Used     Tobacco comment: 40 - 80 pack year history.  Smoked up to two packs per day. Trying to cut back   Substance and Sexual Activity     Alcohol use: No     Drug use: No     Sexual activity: None   Lifestyle     Physical activity:     Days per week: None     Minutes per session:  "None     Stress: None   Relationships     Social connections:     Talks on phone: None     Gets together: None     Attends Orthodox service: None     Active member of club or organization: None     Attends meetings of clubs or organizations: None     Relationship status: None     Intimate partner violence:     Fear of current or ex partner: None     Emotionally abused: None     Physically abused: None     Forced sexual activity: None   Other Topics Concern     Parent/sibling w/ CABG, MI or angioplasty before 65F 55M? Not Asked   Social History Narrative     None       ROS:  Constitutional: No fever, chills, or sweats. No weight gain/loss.   ENT: No visual disturbance, ear ache, epistaxis, sore throat.   Allergies/Immunologic: Negative.   Respiratory: No cough, hemoptysis.   Cardiovascular: As per HPI.   GI: No nausea, vomiting, hematemesis, melena, or hematochezia.   : No urinary frequency, dysuria, or hematuria.   Integument: Negative.   Psychiatric: Negative.   Neuro: Negative.   Endocrinology: Negative.   Musculoskeletal: No myalgia.    VITAL SIGNS:  /73 (BP Location: Right arm, Patient Position: Chair, Cuff Size: Adult Regular)   Pulse 85   Ht 1.715 m (5' 7.5\")   Wt 80.1 kg (176 lb 8 oz)   SpO2 97%   BMI 27.24 kg/m     Body mass index is 27.24 kg/m .  Wt Readings from Last 2 Encounters:   10/02/19 80.1 kg (176 lb 8 oz)   07/01/19 79.4 kg (175 lb 1.6 oz)       PHYSICAL EXAM  Silke Moralez is a 69 year old male in no acute distress.  HEENT: Unremarkable.  Neck: JVP normal.  Carotids +4/4 bilaterally without bruits.  Lungs: CTA.  Cor: RRR. Normal S1 and S2.  No murmur, rub, or gallop.  PMI in Lf 5th ICS.  Abd: Soft, nontender, nondistended.  NABS.  No pulsatile mass.  Extremities: No C/C/E.  Pulses +4/4 symmetric in upper and lower extremities.  Neuro: Grossly intact.    LABS    Lab Results   Component Value Date    WBC 5.2 06/19/2019     Lab Results   Component Value Date    RBC 4.30 06/19/2019 "     Lab Results   Component Value Date    HGB 14.2 2019     Lab Results   Component Value Date    HCT 42.5 2019     No components found for: MCT  Lab Results   Component Value Date    MCV 99 2019     Lab Results   Component Value Date    MCH 33.0 2019     Lab Results   Component Value Date    MCHC 33.4 2019     Lab Results   Component Value Date    RDW 11.8 2019     Lab Results   Component Value Date     2019      Recent Labs   Lab Test 19  0731 18  1628    142   POTASSIUM 4.3 4.0   CHLORIDE 110* 111*   CO2 26 25   ANIONGAP 5 6   GLC 85 86   BUN 20 13   CR 0.87 0.89   CECILIO 9.0 8.8     Recent Labs   Lab Test 19  0731 18  1628  12  1425   CHOL 130 205*   < > 187   HDL 56 38*   < > 42   LDL 61 144*   < > 108   TRIG 64 115   < > 185*   CHOLHDLRATIO  --   --   --  4.4   NHDL 74 167*   < >  --     < > = values in this interval not displayed.        EK  Sinus rhythm    ECHO: 19  Interpretation Summary  Left ventricular size is normal.  The Ejection Fraction is estimated at 45-50%.  Right ventricular function, chamber size, wall motion, and thickness are  normal.  The inferior vena cava is normal.  No pericardial effusion is present.    STRESS TEST:    Interpretation Summary  No stress induced regional wall motion abnormalities.  Normal resting LV function with EF of approximately 60-65%; normal response   to exercise with increase to approximately 70-75%.  No ECG evidence of ischemia.  No subjective evidence of ischemia.  Normal functional capacity.  The patient exhibited a hypertensive response with stress.  PatientHeight: 67 in  PatientWeight: 170 lbs  BSA 1.9 m^2    CARDIAC CATH:  May 2019      ASSESSMENT AND PLAN:    1. CAD, NSTEMI s/p POBA to D1  -- DAPT for 1 year  -- high intensity statin  -- BB for five years, switch from tartrate to Torpol XL 25 mg daily (apparently tolerated it better)    2. Cardiomyopathy - EF on  echo is 45-50%, not entirely explained by his story of MI in [place] given the vessel is a diagonal that was intervened upon, also his troponin elevation seemed like it was out of proportion to his NSTEMI anatomically, it is possible that he had myocarditis and was misdiagnosed, and that would explain his cardiomyopathy. We will do a cardiac MR to evaluate this in 3 months.  -- cardiac MR in 3 months    3. Hypertension  -- BB and CCB    4. Hyperlipidemia  -- continue statin    RTC PRN        Please do not hesitate to contact me if you have any questions/concerns.     Sincerely,     Kash Ferrari MD

## 2019-10-02 NOTE — PROGRESS NOTES
CARDIOLOGY CLINIC FOLLOW UP    HPI: Silke Moralez is a 69 year old male, being seen today for recheck of CAD.     His cardiac history is notable for an NSTEMI while in [facility] for which he underwent a POBA of the diagonal branch. He has since then been doing quite well and denies any chest pain, shortness of breath, orthopnea, PND, nausea, vomiting, lightheadedness, dizziness, or syncope.    He does report being very symptomatic with the metoprolol tartrate and has since then stopped it. He was started on a Chinese version of the metoprolol called Betaloc ZOK (which is just metoprolol succinate) and tolerated it quite well.     They also report that while hospitalized for his NSTEMI in [facility] he had a significant troponin elevation which based on his coronary angiogram images is not entirely consistent. It is possible that he had another diagnosis and the diagonal was just stable disease.     PAST MEDICAL HISTORY:  Past Medical History:   Diagnosis Date     Gastric ulcer      Hyperlipidemia      Smoker        CURRENT MEDICATIONS:  Current Outpatient Medications   Medication Sig Dispense Refill     aspirin 81 MG EC tablet Take 1 tablet (81 mg) by mouth daily 90 tablet 3     atorvastatin (LIPITOR) 40 MG tablet Take 1 tablet (40 mg) by mouth daily 100 tablet 3     metoprolol succinate ER (TOPROL-XL) 25 MG 24 hr tablet Take 0.5 tablets (12.5 mg) by mouth daily 100 tablet 3     omeprazole (PRILOSEC) 40 MG DR capsule Take 1 capsule (40 mg) by mouth every morning (before breakfast) 100 capsule 3     ticagrelor (BRILINTA) 90 MG tablet Take 1 tablet (90 mg) by mouth 2 times daily       UNABLE TO FIND 2.5 mg daily MEDICATION NAME: Levamlodipine besylate       acetaminophen (TYLENOL) 325 MG tablet Take 325-650 mg by mouth every 6 hours as needed for mild pain       amLODIPine (NORVASC) 2.5 MG tablet Take 1 tablet (2.5 mg) by mouth daily       calcium carbonate (TUMS) 500 MG chewable tablet Take 2 chew tab by mouth 2 times daily as  needed for heartburn       ibuprofen (ADVIL/MOTRIN) 400 MG tablet Take 400 mg by mouth every 6 hours as needed for moderate pain       tiotropium (SPIRIVA HANDIHALER) 18 MCG capsule Inhale contents of one capsule daily. (Patient not taking: Reported on 10/2/2019) 90 capsule 3       PAST SURGICAL HISTORY:  Past Surgical History:   Procedure Laterality Date     ESOPHAGOSCOPY, GASTROSCOPY, DUODENOSCOPY (EGD), COMBINED  2014    Procedure: COMBINED ESOPHAGOSCOPY, GASTROSCOPY, DUODENOSCOPY (EGD);  Surgeon: Stone Wan MD;  Location:  GI     NO HISTORY OF SURGERY         ALLERGIES  Patient has no known allergies.    FAMILY HX:  Family History   Problem Relation Age of Onset     Hypertension Father         sibling     Heart Disease Father        SOCIAL HX:  Social History     Socioeconomic History     Marital status:      Spouse name: None     Number of children: None     Years of education: None     Highest education level: None   Occupational History     None   Social Needs     Financial resource strain: None     Food insecurity:     Worry: None     Inability: None     Transportation needs:     Medical: None     Non-medical: None   Tobacco Use     Smoking status: Former Smoker     Packs/day: 1.00     Types: Cigarettes     Last attempt to quit: 2017     Years since quittin.0     Smokeless tobacco: Never Used     Tobacco comment: 40 - 80 pack year history.  Smoked up to two packs per day. Trying to cut back   Substance and Sexual Activity     Alcohol use: No     Drug use: No     Sexual activity: None   Lifestyle     Physical activity:     Days per week: None     Minutes per session: None     Stress: None   Relationships     Social connections:     Talks on phone: None     Gets together: None     Attends Taoism service: None     Active member of club or organization: None     Attends meetings of clubs or organizations: None     Relationship status: None     Intimate partner violence:     Fear of  "current or ex partner: None     Emotionally abused: None     Physically abused: None     Forced sexual activity: None   Other Topics Concern     Parent/sibling w/ CABG, MI or angioplasty before 65F 55M? Not Asked   Social History Narrative     None       ROS:  Constitutional: No fever, chills, or sweats. No weight gain/loss.   ENT: No visual disturbance, ear ache, epistaxis, sore throat.   Allergies/Immunologic: Negative.   Respiratory: No cough, hemoptysis.   Cardiovascular: As per HPI.   GI: No nausea, vomiting, hematemesis, melena, or hematochezia.   : No urinary frequency, dysuria, or hematuria.   Integument: Negative.   Psychiatric: Negative.   Neuro: Negative.   Endocrinology: Negative.   Musculoskeletal: No myalgia.    VITAL SIGNS:  /73 (BP Location: Right arm, Patient Position: Chair, Cuff Size: Adult Regular)   Pulse 85   Ht 1.715 m (5' 7.5\")   Wt 80.1 kg (176 lb 8 oz)   SpO2 97%   BMI 27.24 kg/m    Body mass index is 27.24 kg/m .  Wt Readings from Last 2 Encounters:   10/02/19 80.1 kg (176 lb 8 oz)   07/01/19 79.4 kg (175 lb 1.6 oz)       PHYSICAL EXAM  Silke Moralez is a 69 year old male in no acute distress.  HEENT: Unremarkable.  Neck: JVP normal.  Carotids +4/4 bilaterally without bruits.  Lungs: CTA.  Cor: RRR. Normal S1 and S2.  No murmur, rub, or gallop.  PMI in Lf 5th ICS.  Abd: Soft, nontender, nondistended.  NABS.  No pulsatile mass.  Extremities: No C/C/E.  Pulses +4/4 symmetric in upper and lower extremities.  Neuro: Grossly intact.    LABS    Lab Results   Component Value Date    WBC 5.2 06/19/2019     Lab Results   Component Value Date    RBC 4.30 06/19/2019     Lab Results   Component Value Date    HGB 14.2 06/19/2019     Lab Results   Component Value Date    HCT 42.5 06/19/2019     No components found for: MCT  Lab Results   Component Value Date    MCV 99 06/19/2019     Lab Results   Component Value Date    MCH 33.0 06/19/2019     Lab Results   Component Value Date    MCHC 33.4 " 2019     Lab Results   Component Value Date    RDW 11.8 2019     Lab Results   Component Value Date     2019      Recent Labs   Lab Test 19  0731 18  1628    142   POTASSIUM 4.3 4.0   CHLORIDE 110* 111*   CO2 26 25   ANIONGAP 5 6   GLC 85 86   BUN 20 13   CR 0.87 0.89   CECILIO 9.0 8.8     Recent Labs   Lab Test 19  0731 18  1628  12  1425   CHOL 130 205*   < > 187   HDL 56 38*   < > 42   LDL 61 144*   < > 108   TRIG 64 115   < > 185*   CHOLHDLRATIO  --   --   --  4.4   NHDL 74 167*   < >  --     < > = values in this interval not displayed.        EK  Sinus rhythm    ECHO: 19  Interpretation Summary  Left ventricular size is normal.  The Ejection Fraction is estimated at 45-50%.  Right ventricular function, chamber size, wall motion, and thickness are  normal.  The inferior vena cava is normal.  No pericardial effusion is present.    STRESS TEST:    Interpretation Summary  No stress induced regional wall motion abnormalities.  Normal resting LV function with EF of approximately 60-65%; normal response   to exercise with increase to approximately 70-75%.  No ECG evidence of ischemia.  No subjective evidence of ischemia.  Normal functional capacity.  The patient exhibited a hypertensive response with stress.  PatientHeight: 67 in  PatientWeight: 170 lbs  BSA 1.9 m^2    CARDIAC CATH:  May 2019      ASSESSMENT AND PLAN:    1. CAD, NSTEMI s/p POBA to D1  -- DAPT for 1 year  -- high intensity statin  -- BB for five years, switch from tartrate to Torpol XL 25 mg daily (apparently tolerated it better)    2. Cardiomyopathy - EF on echo is 45-50%, not entirely explained by his story of MI in [place] given the vessel is a diagonal that was intervened upon, also his troponin elevation seemed like it was out of proportion to his NSTEMI anatomically, it is possible that he had myocarditis and was misdiagnosed, and that would explain his cardiomyopathy. We will  do a cardiac MR to evaluate this in 3 months.  -- cardiac MR in 3 months    3. Hypertension  -- BB and CCB    4. Hyperlipidemia  -- continue statin    RTC PRN

## 2019-10-02 NOTE — NURSING NOTE
Chief Complaint   Patient presents with     Follow Up     establish care with cardiologist, manage coronary artery disease involving native vessel     Vitals were taken and medications were reconciled.     Jolanta Lay RMA  9:56 AM

## 2019-10-02 NOTE — PATIENT INSTRUCTIONS
Patient Instructions:  It was a pleasure to see you in the cardiology clinic today.      If you have any questions, call  Ele Gutierres RN, at [phone].  Press Option #1 for the [facility], and then press Option #4  We are encouraging the use of [employer] to communicate with your HealthCare Provider    Note the new medications: metoprolol succinate 25 mg by mouth daily  Am  Stop the following medications: none    The results from today include: pending cardiac MRI in three months  Please follow up with phone call after MRI      If you have an urgent need after hours (8:00 am to 4:30 pm) please call [phone] and ask for the cardiology fellow on call.

## 2019-11-02 ENCOUNTER — HEALTH MAINTENANCE LETTER (OUTPATIENT)
Age: 69
End: 2019-11-02

## 2019-12-02 ENCOUNTER — TELEPHONE (OUTPATIENT)
Dept: CARDIOLOGY | Facility: CLINIC | Age: 69
End: 2019-12-02

## 2019-12-02 NOTE — TELEPHONE ENCOUNTER
[facility] Call Center    Phone Message    May a detailed message be left on voicemail: yes    Reason for Call: Other: Pt's wife called to request that the pt's Echo orders be extended. They are wanting to scheudule it sometime in February, and were told that the orders need to be extended first.     Action Taken: Message routed to:  [facility] ([facility] Cardiology

## 2020-02-10 ENCOUNTER — HEALTH MAINTENANCE LETTER (OUTPATIENT)
Age: 70
End: 2020-02-10

## 2020-03-03 ENCOUNTER — OFFICE VISIT (OUTPATIENT)
Dept: OPHTHALMOLOGY | Facility: CLINIC | Age: 70
End: 2020-03-03
Payer: COMMERCIAL

## 2020-03-03 ENCOUNTER — HOSPITAL ENCOUNTER (OUTPATIENT)
Dept: MRI IMAGING | Facility: CLINIC | Age: 70
Discharge: HOME | End: 2020-03-03
Attending: INTERNAL MEDICINE | Admitting: INTERNAL MEDICINE
Payer: COMMERCIAL

## 2020-03-03 DIAGNOSIS — I25.10 CORONARY ARTERY DISEASE INVOLVING NATIVE CORONARY ARTERY OF NATIVE HEART WITHOUT ANGINA PECTORIS: ICD-10-CM

## 2020-03-03 DIAGNOSIS — I21.4 NSTEMI (NON-ST ELEVATED MYOCARDIAL INFARCTION) (H): ICD-10-CM

## 2020-03-03 DIAGNOSIS — H04.123 DRY EYE SYNDROME, BILATERAL: Primary | ICD-10-CM

## 2020-03-03 DIAGNOSIS — H25.13 NUCLEAR SENILE CATARACT OF BOTH EYES: ICD-10-CM

## 2020-03-03 DIAGNOSIS — H02.889 MGD (MEIBOMIAN GLAND DYSFUNCTION): ICD-10-CM

## 2020-03-03 PROCEDURE — T1013 SIGN LANG/ORAL INTERPRETER: HCPCS | Mod: U3

## 2020-03-03 PROCEDURE — A9585 GADOBUTROL INJECTION: HCPCS | Performed by: INTERNAL MEDICINE

## 2020-03-03 PROCEDURE — 75561 CARDIAC MRI FOR MORPH W/DYE: CPT

## 2020-03-03 PROCEDURE — 75561 CARDIAC MRI FOR MORPH W/DYE: CPT | Mod: 26 | Performed by: INTERNAL MEDICINE

## 2020-03-03 PROCEDURE — 25500064 ZZH RX 255 OP 636: Performed by: INTERNAL MEDICINE

## 2020-03-03 PROCEDURE — G0463 HOSPITAL OUTPT CLINIC VISIT: HCPCS | Mod: 25

## 2020-03-03 RX ORDER — GADOBUTROL 604.72 MG/ML
10 INJECTION INTRAVENOUS ONCE
Status: COMPLETED | OUTPATIENT
Start: 2020-03-03 | End: 2020-03-03

## 2020-03-03 RX ADMIN — GADOBUTROL 10 ML: 604.72 INJECTION INTRAVENOUS at 10:02

## 2020-03-03 ASSESSMENT — SLIT LAMP EXAM - LIDS
COMMENTS: MGD
COMMENTS: MGD

## 2020-03-03 ASSESSMENT — VISUAL ACUITY
METHOD: NUMBERS - LINEAR
OS_SC: 20/50
OD_SC: 20/70
OS_PH_SC: 20/25
OD_SC+: +1
OS_SC+: -2
OD_PH_SC: 20/20

## 2020-03-03 ASSESSMENT — CUP TO DISC RATIO
OD_RATIO: 0.35
OS_RATIO: 0.3

## 2020-03-03 ASSESSMENT — EXTERNAL EXAM - RIGHT EYE: OD_EXAM: NORMAL

## 2020-03-03 ASSESSMENT — CONF VISUAL FIELD
METHOD: COUNTING FINGERS
OD_NORMAL: 1
OS_NORMAL: 1

## 2020-03-03 ASSESSMENT — EXTERNAL EXAM - LEFT EYE: OS_EXAM: NORMAL

## 2020-03-03 ASSESSMENT — TONOMETRY
IOP_METHOD: TONOPEN
OS_IOP_MMHG: 13
OD_IOP_MMHG: 15

## 2020-03-03 NOTE — PROGRESS NOTES
I have confirmed the patient's and reviewed Past Medical History, Past Surgical History, Social History, Family History, Problem List, Medication List and agree with Tech note.    CC: dry itchy eyes    HPI: Silke Moralez is a 70 year old male who presents with dry itchy eyes on and off for the past few years. More symptomatic in winter. No known allergies. Reports stable vision each eye. No hx of glasses or CTL use. No hx of eye surgery or eye trauma. No pain, flashes, or floaters. No cold or flu-like Sx.    POHx: NA  PMHx:  CAD hx of MI, treated HTN  Family Hx: no known glaucoma, AMD.   Social Hx: former smoker  Medications: reviewed    Silke Moralez is a 70 year old male with the following diagnoses:   1. Dry eye syndrome, bilateral    2. MGD (meibomian gland dysfunction)    3. Nuclear senile cataract of both eyes       Longstanding dryness and itching worse in winter months. Returned from [place] end of January. No cold or flu-like symptoms. No papillary or follicular reaction on exam today. No known allergies.    - Start AT, WC. Escalate as needed.  - Return for surface check, dilated exam with MRx in 1 month       Patient disposition:   Return in about 1 month (around 4/3/2020) for dilated eye exam with refraction. Sooner as needed.         Daniel Samuels MD  Department of Ophthalmology - PGY3      Attending Physician Attestation:  Complete documentation of historical and exam elements from today's encounter can be found in the full encounter summary report (not reduplicated in this progress note).  I personally obtained the chief complaint(s) and history of present illness.  I confirmed and edited as necessary the review of systems, past medical/surgical history, family history, social history, and examination findings as documented by others; and I examined the patient myself.  I personally reviewed the relevant tests, images, and reports as documented above.  I formulated and edited as necessary the assessment  and plan and discussed the findings and management plan with the patient and family. . - Lopez Morgan MD

## 2020-03-11 ENCOUNTER — OFFICE VISIT (OUTPATIENT)
Dept: CARDIOLOGY | Facility: CLINIC | Age: 70
End: 2020-03-11
Attending: INTERNAL MEDICINE
Payer: COMMERCIAL

## 2020-03-11 VITALS
OXYGEN SATURATION: 97 % | WEIGHT: 176 LBS | HEART RATE: 77 BPM | DIASTOLIC BLOOD PRESSURE: 66 MMHG | BODY MASS INDEX: 26.67 KG/M2 | SYSTOLIC BLOOD PRESSURE: 109 MMHG | HEIGHT: 68 IN

## 2020-03-11 DIAGNOSIS — I25.10 CORONARY ARTERY DISEASE INVOLVING NATIVE CORONARY ARTERY OF NATIVE HEART WITHOUT ANGINA PECTORIS: ICD-10-CM

## 2020-03-11 DIAGNOSIS — I21.4 NSTEMI (NON-ST ELEVATED MYOCARDIAL INFARCTION) (H): ICD-10-CM

## 2020-03-11 PROCEDURE — G0463 HOSPITAL OUTPT CLINIC VISIT: HCPCS | Mod: ZF

## 2020-03-11 PROCEDURE — 99214 OFFICE O/P EST MOD 30 MIN: CPT | Mod: ZP | Performed by: INTERNAL MEDICINE

## 2020-03-11 RX ORDER — ATORVASTATIN CALCIUM 20 MG/1
20 TABLET, FILM COATED ORAL DAILY
Qty: 90 TABLET | Refills: 3 | COMMUNITY
Start: 2020-03-11 | End: 2020-03-17

## 2020-03-11 RX ORDER — METOPROLOL SUCCINATE 25 MG/1
12.5 TABLET, EXTENDED RELEASE ORAL DAILY
Qty: 90 TABLET | Refills: 3 | COMMUNITY
Start: 2020-03-11 | End: 2020-11-25

## 2020-03-11 ASSESSMENT — PAIN SCALES - GENERAL: PAINLEVEL: NO PAIN (0)

## 2020-03-11 ASSESSMENT — MIFFLIN-ST. JEOR: SCORE: 1519.89

## 2020-03-11 NOTE — LETTER
3/11/2020      RE: Silke Moralez  [address] [facility] [place] [address]       Dear Colleague,    Thank you for the opportunity to participate in the care of your patient, Silke Moralez, at the [facility] HEART [facility] at [facility]. Please see a copy of my visit note below.    CARDIOLOGY CLINIC FOLLOW UP    HPI: Silke Moralez is a 70 year old male, being seen today for recheck of CAD.     His cardiac history is notable for an NSTEMI while in [place] for which he underwent a POBA of the diagonal branch. He has since then been doing quite well and denies any chest pain, shortness of breath, orthopnea, PND, nausea, vomiting, lightheadedness, dizziness, or syncope.    Since the last the visit we had him do a cardiac MRI which showed an LAD infarct with preserved EF. He returns now for a follow up visit. Today, he is complaining of intolerance to the majority of his medications. He has stopped aspirin and cut his Brilinta in half. He also cut his BB and Lipitor in half. He reports adverse side effects with all of these medications.     He is otherwise chest pain free, no dyspnea, orthopnea, PND, palpitations, lightheadedness or syncope.    PAST MEDICAL HISTORY:  Past Medical History:   Diagnosis Date     Gastric ulcer      Heart attack (H)      Hyperlipidemia      Smoker        CURRENT MEDICATIONS:  Current Outpatient Medications   Medication Sig Dispense Refill     acetaminophen (TYLENOL) 325 MG tablet Take 325-650 mg by mouth every 6 hours as needed for mild pain       amLODIPine (NORVASC) 2.5 MG tablet Take 1 tablet (2.5 mg) by mouth daily 90 tablet 3     aspirin 81 MG EC tablet Take 1 tablet (81 mg) by mouth daily 90 tablet 3     atorvastatin (LIPITOR) 40 MG tablet Take 1 tablet (40 mg) by mouth daily 100 tablet 3     calcium carbonate (TUMS) 500 MG chewable tablet Take 2 chew tab by mouth 2 times daily as needed for heartburn       ibuprofen (ADVIL/MOTRIN) 400 MG tablet Take 400 mg by  mouth every 6 hours as needed for moderate pain       metoprolol succinate ER (TOPROL XL) 25 MG 24 hr tablet Take 1 tablet (25 mg) by mouth daily 90 tablet 3     omeprazole (PRILOSEC) 40 MG DR capsule Take 1 capsule (40 mg) by mouth every morning (before breakfast) 100 capsule 3     ticagrelor (BRILINTA) 90 MG tablet Take 1 tablet (90 mg) by mouth 2 times daily 90 tablet 3     UNABLE TO FIND 2.5 mg daily MEDICATION NAME: Levamlodipine besylate         PAST SURGICAL HISTORY:  Past Surgical History:   Procedure Laterality Date     ESOPHAGOSCOPY, GASTROSCOPY, DUODENOSCOPY (EGD), COMBINED  2014    Procedure: COMBINED ESOPHAGOSCOPY, GASTROSCOPY, DUODENOSCOPY (EGD);  Surgeon: Stone Wan MD;  Location:  GI     NO HISTORY OF SURGERY         ALLERGIES  Patient has no known allergies.    FAMILY HX:  Family History   Problem Relation Age of Onset     Hypertension Father         sibling     Heart Disease Father      Glaucoma No family hx of      Macular Degeneration No family hx of        SOCIAL HX:  Social History     Socioeconomic History     Marital status:      Spouse name: None     Number of children: None     Years of education: None     Highest education level: None   Occupational History     None   Social Needs     Financial resource strain: None     Food insecurity:     Worry: None     Inability: None     Transportation needs:     Medical: None     Non-medical: None   Tobacco Use     Smoking status: Former Smoker     Packs/day: 1.00     Types: Cigarettes     Last attempt to quit: 2017     Years since quittin.0     Smokeless tobacco: Never Used     Tobacco comment: 40 - 80 pack year history.  Smoked up to two packs per day. Trying to cut back   Substance and Sexual Activity     Alcohol use: No     Drug use: No     Sexual activity: None   Lifestyle     Physical activity:     Days per week: None     Minutes per session: None     Stress: None   Relationships     Social connections:     Talks on  "phone: None     Gets together: None     Attends Faith service: None     Active member of club or organization: None     Attends meetings of clubs or organizations: None     Relationship status: None     Intimate partner violence:     Fear of current or ex partner: None     Emotionally abused: None     Physically abused: None     Forced sexual activity: None   Other Topics Concern     Parent/sibling w/ CABG, MI or angioplasty before 65F 55M? Not Asked   Social History Narrative     None       ROS:  Constitutional: No fever, chills, or sweats. No weight gain/loss.   ENT: No visual disturbance, ear ache, epistaxis, sore throat.   Allergies/Immunologic: Negative.   Respiratory: No cough, hemoptysis.   Cardiovascular: As per HPI.   GI: No nausea, vomiting, hematemesis, melena, or hematochezia.   : No urinary frequency, dysuria, or hematuria.   Integument: Negative.   Psychiatric: Negative.   Neuro: Negative.   Endocrinology: Negative.   Musculoskeletal: No myalgia.    VITAL SIGNS:  /66 (BP Location: Right arm, Patient Position: Chair, Cuff Size: Adult Regular)   Pulse 77   Ht 1.715 m (5' 7.5\")   Wt 79.8 kg (176 lb)   SpO2 97%   BMI 27.16 kg/m    Body mass index is 27.16 kg/m .  Wt Readings from Last 2 Encounters:   03/11/20 79.8 kg (176 lb)   10/02/19 80.1 kg (176 lb 8 oz)       PHYSICAL EXAM  Silke Moralez is a 69 year old male in no acute distress.  HEENT: Unremarkable.  Neck: JVP normal.  Carotids +4/4 bilaterally without bruits.  Lungs: CTA.  Cor: RRR. Normal S1 and S2.  No murmur, rub, or gallop.  PMI in Lf 5th ICS.  Abd: Soft, nontender, nondistended.  NABS.  No pulsatile mass.  Extremities: No C/C/E.  Pulses +4/4 symmetric in upper and lower extremities.  Neuro: Grossly intact.    LABS    Lab Results   Component Value Date    WBC 5.2 06/19/2019     Lab Results   Component Value Date    RBC 4.30 06/19/2019     Lab Results   Component Value Date    HGB 14.2 06/19/2019     Lab Results   Component " Value Date    HCT 42.5 2019     No components found for: MCT  Lab Results   Component Value Date    MCV 99 2019     Lab Results   Component Value Date    MCH 33.0 2019     Lab Results   Component Value Date    MCHC 33.4 2019     Lab Results   Component Value Date    RDW 11.8 2019     Lab Results   Component Value Date     2019      Recent Labs   Lab Test 19  0731 18  1628    142   POTASSIUM 4.3 4.0   CHLORIDE 110* 111*   CO2 26 25   ANIONGAP 5 6   GLC 85 86   BUN 20 13   CR 0.87 0.89   CECILIO 9.0 8.8     Recent Labs   Lab Test 19  0731 18  1628  12  1425   CHOL 130 205*   < > 187   HDL 56 38*   < > 42   LDL 61 144*   < > 108   TRIG 64 115   < > 185*   CHOLHDLRATIO  --   --   --  4.4   NHDL 74 167*   < >  --     < > = values in this interval not displayed.        EK  Sinus rhythm    ECHO: 19  Interpretation Summary  Left ventricular size is normal.  The Ejection Fraction is estimated at 45-50%.  Right ventricular function, chamber size, wall motion, and thickness are  normal.  The inferior vena cava is normal.  No pericardial effusion is present.    STRESS TEST:    Interpretation Summary  No stress induced regional wall motion abnormalities.  Normal resting LV function with EF of approximately 60-65%; normal response   to exercise with increase to approximately 70-75%.  No ECG evidence of ischemia.  No subjective evidence of ischemia.  Normal functional capacity.  The patient exhibited a hypertensive response with stress.  PatientHeight: 67 in  PatientWeight: 170 lbs  BSA 1.9 m^2    CARDIAC CATH:  May 2019      ASSESSMENT AND PLAN:    1. CAD, NSTEMI in [place], no stents placed  -- he is refusing to continue taking DAPT or full dose of a single anti platelet med, due to frequent epistaxis, we will instead switch him to Ticagrelor 60 mg BID as there is data for continued low dose Brilinta use this patient population, though  usually in combination with aspirin  -- he is now on moderate intensity statin because of side effects, we will obtain a lipid panel to make sure he is at goal <70  -- BB for five years, 12.5 mg now due to side effects of lightheadedness    2. Cardiomyopathy - normalized at 52%  -- continue BB as above, unable to take ACEI or ARB due to side effects    3. Hypertension, well controlled  -- BB and CCB    4. Hyperlipidemia, previously well controlled, we'll repeat a lipid panel tomorrow  -- continue statin    RTC 1 year with PAUL        Please do not hesitate to contact me if you have any questions/concerns.     Sincerely,     Kash Ferrari MD

## 2020-03-11 NOTE — PATIENT INSTRUCTIONS
Patient Instructions:  It was a pleasure to see you in the cardiology clinic today.      If you have any questions, call  Ele Gutierres RN, at [phone].  Press Option #1 for the [facility], and then press Option #4  We are encouraging the use of Confide[employer] to communicate with your HealthCare Provider    Note the new medications: start ticagrelor 60 mg bid  Decrease metoprolol dose to 12.5 mg daily  Decrease lipitor to 20 mg daily    Stop the following medications: none    The results from today include: pending lipid panel   Please follow up with PAUL in one year        If you have an urgent need after hours (8:00 am to 4:30 pm) please call [phone] and ask for the cardiology fellow on call.

## 2020-03-11 NOTE — NURSING NOTE
Labs: Patient was given results of the laboratory testing obtained today. Patient was instructed to return for the next laboratory testing in one week . Patient demonstrated understanding of this information and agreed to call with further questions or concerns.   Med Reconcile: Reviewed and verified all current medications with the patient. The updated medication list was printed and given to the patient.  Return Appointment: Patient given instructions regarding scheduling next clinic visit. Patient demonstrated understanding of this information and agreed to call with further questions or concerns.  Medication Change: Patient was educated regarding prescribed medication change, including discussion of the indication, administration, side effects, and when to report to MD or RN. Patient demonstrated understanding of this information and agreed to call with further questions or concerns.  Patient stated he understood all health information given and agreed to call with further questions or concerns.

## 2020-03-11 NOTE — NURSING NOTE
Chief Complaint   Patient presents with     Follow Up     manage coronary artery disease involving native vessel     Vitals were taken and medications were reconciled.     Mague Lay CMA    10:30 AM

## 2020-03-11 NOTE — PROGRESS NOTES
CARDIOLOGY CLINIC FOLLOW UP    HPI: Silke Moralez is a 70 year old male, being seen today for recheck of CAD.     His cardiac history is notable for an NSTEMI while in [place] for which he underwent a POBA of the diagonal branch. He has since then been doing quite well and denies any chest pain, shortness of breath, orthopnea, PND, nausea, vomiting, lightheadedness, dizziness, or syncope.    Since the last the visit we had him do a cardiac MRI which showed an LAD infarct with preserved EF. He returns now for a follow up visit. Today, he is complaining of intolerance to the majority of his medications. He has stopped aspirin and cut his Brilinta in half. He also cut his BB and Lipitor in half. He reports adverse side effects with all of these medications.     He is otherwise chest pain free, no dyspnea, orthopnea, PND, palpitations, lightheadedness or syncope.    PAST MEDICAL HISTORY:  Past Medical History:   Diagnosis Date     Gastric ulcer      Heart attack (H)      Hyperlipidemia      Smoker        CURRENT MEDICATIONS:  Current Outpatient Medications   Medication Sig Dispense Refill     acetaminophen (TYLENOL) 325 MG tablet Take 325-650 mg by mouth every 6 hours as needed for mild pain       amLODIPine (NORVASC) 2.5 MG tablet Take 1 tablet (2.5 mg) by mouth daily 90 tablet 3     aspirin 81 MG EC tablet Take 1 tablet (81 mg) by mouth daily 90 tablet 3     atorvastatin (LIPITOR) 40 MG tablet Take 1 tablet (40 mg) by mouth daily 100 tablet 3     calcium carbonate (TUMS) 500 MG chewable tablet Take 2 chew tab by mouth 2 times daily as needed for heartburn       ibuprofen (ADVIL/MOTRIN) 400 MG tablet Take 400 mg by mouth every 6 hours as needed for moderate pain       metoprolol succinate ER (TOPROL XL) 25 MG 24 hr tablet Take 1 tablet (25 mg) by mouth daily 90 tablet 3     omeprazole (PRILOSEC) 40 MG DR capsule Take 1 capsule (40 mg) by mouth every morning (before breakfast) 100 capsule 3     ticagrelor (BRILINTA) 90 MG  tablet Take 1 tablet (90 mg) by mouth 2 times daily 90 tablet 3     UNABLE TO FIND 2.5 mg daily MEDICATION NAME: Levamlodipine besylate         PAST SURGICAL HISTORY:  Past Surgical History:   Procedure Laterality Date     ESOPHAGOSCOPY, GASTROSCOPY, DUODENOSCOPY (EGD), COMBINED  2014    Procedure: COMBINED ESOPHAGOSCOPY, GASTROSCOPY, DUODENOSCOPY (EGD);  Surgeon: Stone Wan MD;  Location:  GI     NO HISTORY OF SURGERY         ALLERGIES  Patient has no known allergies.    FAMILY HX:  Family History   Problem Relation Age of Onset     Hypertension Father         sibling     Heart Disease Father      Glaucoma No family hx of      Macular Degeneration No family hx of        SOCIAL HX:  Social History     Socioeconomic History     Marital status:      Spouse name: None     Number of children: None     Years of education: None     Highest education level: None   Occupational History     None   Social Needs     Financial resource strain: None     Food insecurity:     Worry: None     Inability: None     Transportation needs:     Medical: None     Non-medical: None   Tobacco Use     Smoking status: Former Smoker     Packs/day: 1.00     Types: Cigarettes     Last attempt to quit: 2017     Years since quittin.0     Smokeless tobacco: Never Used     Tobacco comment: 40 - 80 pack year history.  Smoked up to two packs per day. Trying to cut back   Substance and Sexual Activity     Alcohol use: No     Drug use: No     Sexual activity: None   Lifestyle     Physical activity:     Days per week: None     Minutes per session: None     Stress: None   Relationships     Social connections:     Talks on phone: None     Gets together: None     Attends [employer] service: None     Active member of club or organization: None     Attends meetings of clubs or organizations: None     Relationship status: None     Intimate partner violence:     Fear of current or ex partner: None     Emotionally abused: None      "Physically abused: None     Forced sexual activity: None   Other Topics Concern     Parent/sibling w/ CABG, MI or angioplasty before 65F 55M? Not Asked   Social History Narrative     None       ROS:  Constitutional: No fever, chills, or sweats. No weight gain/loss.   ENT: No visual disturbance, ear ache, epistaxis, sore throat.   Allergies/Immunologic: Negative.   Respiratory: No cough, hemoptysis.   Cardiovascular: As per HPI.   GI: No nausea, vomiting, hematemesis, melena, or hematochezia.   : No urinary frequency, dysuria, or hematuria.   Integument: Negative.   Psychiatric: Negative.   Neuro: Negative.   Endocrinology: Negative.   Musculoskeletal: No myalgia.    VITAL SIGNS:  /66 (BP Location: Right arm, Patient Position: Chair, Cuff Size: Adult Regular)   Pulse 77   Ht 1.715 m (5' 7.5\")   Wt 79.8 kg (176 lb)   SpO2 97%   BMI 27.16 kg/m    Body mass index is 27.16 kg/m .  Wt Readings from Last 2 Encounters:   03/11/20 79.8 kg (176 lb)   10/02/19 80.1 kg (176 lb 8 oz)       PHYSICAL EXAM  Silke Moralez is a 69 year old male in no acute distress.  HEENT: Unremarkable.  Neck: JVP normal.  Carotids +4/4 bilaterally without bruits.  Lungs: CTA.  Cor: RRR. Normal S1 and S2.  No murmur, rub, or gallop.  PMI in Lf 5th ICS.  Abd: Soft, nontender, nondistended.  NABS.  No pulsatile mass.  Extremities: No C/C/E.  Pulses +4/4 symmetric in upper and lower extremities.  Neuro: Grossly intact.    LABS    Lab Results   Component Value Date    WBC 5.2 06/19/2019     Lab Results   Component Value Date    RBC 4.30 06/19/2019     Lab Results   Component Value Date    HGB 14.2 06/19/2019     Lab Results   Component Value Date    HCT 42.5 06/19/2019     No components found for: MCT  Lab Results   Component Value Date    MCV 99 06/19/2019     Lab Results   Component Value Date    MCH 33.0 06/19/2019     Lab Results   Component Value Date    MCHC 33.4 06/19/2019     Lab Results   Component Value Date    RDW 11.8 06/19/2019 "     Lab Results   Component Value Date     2019      Recent Labs   Lab Test 19  0731 18  1628    142   POTASSIUM 4.3 4.0   CHLORIDE 110* 111*   CO2 26 25   ANIONGAP 5 6   GLC 85 86   BUN 20 13   CR 0.87 0.89   CECILIO 9.0 8.8     Recent Labs   Lab Test 19  0731 18  1628  12  1425   CHOL 130 205*   < > 187   HDL 56 38*   < > 42   LDL 61 144*   < > 108   TRIG 64 115   < > 185*   CHOLHDLRATIO  --   --   --  4.4   NHDL 74 167*   < >  --     < > = values in this interval not displayed.        EK  Sinus rhythm    ECHO: 19  Interpretation Summary  Left ventricular size is normal.  The Ejection Fraction is estimated at 45-50%.  Right ventricular function, chamber size, wall motion, and thickness are  normal.  The inferior vena cava is normal.  No pericardial effusion is present.    STRESS TEST:    Interpretation Summary  No stress induced regional wall motion abnormalities.  Normal resting LV function with EF of approximately 60-65%; normal response   to exercise with increase to approximately 70-75%.  No ECG evidence of ischemia.  No subjective evidence of ischemia.  Normal functional capacity.  The patient exhibited a hypertensive response with stress.  PatientHeight: 67 in  PatientWeight: 170 lbs  BSA 1.9 m^2    CARDIAC CATH:  May 2019      ASSESSMENT AND PLAN:    1. CAD, NSTEMI in [place], no stents placed  -- he is refusing to continue taking DAPT or full dose of a single anti platelet med, due to frequent epistaxis, we will instead switch him to Ticagrelor 60 mg BID as there is data for continued low dose Brilinta use this patient population, though usually in combination with aspirin  -- he is now on moderate intensity statin because of side effects, we will obtain a lipid panel to make sure he is at goal <70  -- BB for five years, 12.5 mg now due to side effects of lightheadedness    2. Cardiomyopathy - normalized at 52%  -- continue BB as above, unable to  take ACEI or ARB due to side effects    3. Hypertension, well controlled  -- BB and CCB    4. Hyperlipidemia, previously well controlled, we'll repeat a lipid panel tomorrow  -- continue statin    RTC 1 year with PAUL

## 2020-03-12 DIAGNOSIS — I25.10 CORONARY ARTERY DISEASE INVOLVING NATIVE CORONARY ARTERY OF NATIVE HEART WITHOUT ANGINA PECTORIS: ICD-10-CM

## 2020-03-12 LAB
CHOLEST SERPL-MCNC: 139 MG/DL
HDLC SERPL-MCNC: 47 MG/DL
LDLC SERPL CALC-MCNC: 71 MG/DL
NONHDLC SERPL-MCNC: 92 MG/DL
TRIGL SERPL-MCNC: 108 MG/DL

## 2020-03-15 ENCOUNTER — MYC MEDICAL ADVICE (OUTPATIENT)
Dept: CARDIOLOGY | Facility: CLINIC | Age: 70
End: 2020-03-15

## 2020-03-15 DIAGNOSIS — I25.10 CORONARY ARTERY DISEASE INVOLVING NATIVE CORONARY ARTERY OF NATIVE HEART WITHOUT ANGINA PECTORIS: ICD-10-CM

## 2020-03-17 RX ORDER — ATORVASTATIN CALCIUM 20 MG/1
20 TABLET, FILM COATED ORAL DAILY
Qty: 90 TABLET | Refills: 3 | Status: SHIPPED | OUTPATIENT
Start: 2020-03-17 | End: 2021-09-03

## 2020-04-29 ENCOUNTER — VIRTUAL VISIT (OUTPATIENT)
Dept: INTERNAL MEDICINE | Facility: CLINIC | Age: 70
End: 2020-04-29
Payer: COMMERCIAL

## 2020-04-29 DIAGNOSIS — R49.9 CHANGE IN VOICE: ICD-10-CM

## 2020-04-29 DIAGNOSIS — R49.0 HOARSENESS: Primary | ICD-10-CM

## 2020-04-29 DIAGNOSIS — F17.200 SMOKER: ICD-10-CM

## 2020-04-29 NOTE — PROGRESS NOTES
SUBJECTIVE:  Silke Moralez is a 70 year old male who presents with the following concerns: His wife reports he has had a 3-week history of hoarseness and change in his voice.  Is not associated with any fever chills or sweats.  He has had intermittent cough productive of some clear phlegm.  No chest pain or congestion.  He has a 45-year history of smoking and he quit smoking last year after his myocardial infarction.  Is not smoking for the past year and he has had some intermittent cough since quitting smoking.  No difficulty swallowing or other complaints.  We did discuss in light of his smoking history that he should likely have ENT exam of his larynx as well as low-dose CT lung cancer screening.  Referral was placed for ENT as well as for the CT screening.  (7 minutes)  Paulino Harper MD

## 2020-04-29 NOTE — NURSING NOTE
Chief Complaint   Patient presents with     Pharyngitis     Pt would like to discuss sore throat      YUVAL Wright at 11:49 AM sign on 4/29/2020

## 2020-05-12 ENCOUNTER — TRANSFERRED RECORDS (OUTPATIENT)
Dept: HEALTH INFORMATION MANAGEMENT | Facility: CLINIC | Age: 70
End: 2020-05-12

## 2020-05-18 ENCOUNTER — TELEPHONE (OUTPATIENT)
Dept: INTERNAL MEDICINE | Facility: CLINIC | Age: 70
End: 2020-05-18

## 2020-05-18 DIAGNOSIS — K21.00 GASTROESOPHAGEAL REFLUX DISEASE WITH ESOPHAGITIS: ICD-10-CM

## 2020-05-18 NOTE — TELEPHONE ENCOUNTER
Pt states now taking 80mg daily of omeprazole, need new rx  Thank you!  Teresa Pena CPhT  [facility]

## 2020-05-19 ENCOUNTER — MYC MEDICAL ADVICE (OUTPATIENT)
Dept: INTERNAL MEDICINE | Facility: CLINIC | Age: 70
End: 2020-05-19

## 2020-05-19 RX ORDER — OMEPRAZOLE 40 MG/1
40 CAPSULE, DELAYED RELEASE ORAL
Qty: 200 CAPSULE | Refills: 3 | Status: SHIPPED | OUTPATIENT
Start: 2020-05-19 | End: 2022-09-28

## 2020-05-20 NOTE — TELEPHONE ENCOUNTER
I called the [facility]. Staff stated patient will need to contact the pharmacy to have the script sent. Staff also took a message and will see if one of their staff members can reach out to the patient.     Zamzam Sanders) ADONAY Bob

## 2020-07-31 ENCOUNTER — TELEPHONE (OUTPATIENT)
Dept: NURSING | Facility: CLINIC | Age: 70
End: 2020-07-31

## 2020-07-31 NOTE — TELEPHONE ENCOUNTER
".[facility]: Nurse Triage Note  SITUATION/BACKGROUND                                                      Silke Moralez is a 70 year old male who calls with wife to report patient having a temperature of 98.7 today.   He recently had the Shingles series and last injection a week ago.   Wife stated that his voice seems slightly hoarse.   However, denies any other symptoms at this time.   Home care instructions given per protocol for \"fever\".   This nurse informed wife to check temperature every 2 to 4 hrs and give Tylenol if needed for temperature 100.0 or more.   Seek medical care at Urgent care if > 101 and no reduction with fever reducing measures.   Seek ED care as noted below.   Encouraged to keep scheduled appointment with PCP on 8/4/20    RECOMMENDATION/PLAN                                                      RECOMMENDED DISPOSITION: home care instructions per protocol.   Seek ED care with seizure; unresponsiveness; difficulty breathing; immunosuppressed and signs of dehydration; headache, neck stiffness, and /or photophobia; purple or blood colored spots on skin.   Will comply with recommendation: Yes    If further questions/concerns or if symptoms do not improve, worsen or new symptoms develop, call your PCP or [phone] to talk with the Resident on call, as soon as possible.    Guideline used: fever  Telephone Triage Protocols for Nurses, Fifth Edition, Tracy Deluca, RN, RN  "

## 2020-10-08 ENCOUNTER — MYC MEDICAL ADVICE (OUTPATIENT)
Dept: CARDIOLOGY | Facility: CLINIC | Age: 70
End: 2020-10-08

## 2020-11-14 ENCOUNTER — HEALTH MAINTENANCE LETTER (OUTPATIENT)
Age: 70
End: 2020-11-14

## 2020-11-19 DIAGNOSIS — I21.4 NSTEMI (NON-ST ELEVATED MYOCARDIAL INFARCTION) (H): ICD-10-CM

## 2020-11-19 DIAGNOSIS — I25.10 CORONARY ARTERY DISEASE INVOLVING NATIVE CORONARY ARTERY OF NATIVE HEART WITHOUT ANGINA PECTORIS: ICD-10-CM

## 2020-11-25 DIAGNOSIS — I21.4 NSTEMI (NON-ST ELEVATED MYOCARDIAL INFARCTION) (H): ICD-10-CM

## 2020-11-25 DIAGNOSIS — I25.10 CORONARY ARTERY DISEASE INVOLVING NATIVE CORONARY ARTERY OF NATIVE HEART WITHOUT ANGINA PECTORIS: ICD-10-CM

## 2020-11-25 RX ORDER — AMLODIPINE BESYLATE 2.5 MG/1
2.5 TABLET ORAL DAILY
Qty: 90 TABLET | Refills: 3 | Status: SHIPPED | OUTPATIENT
Start: 2020-11-25 | End: 2021-09-03

## 2020-11-25 RX ORDER — METOPROLOL SUCCINATE 25 MG/1
12.5 TABLET, EXTENDED RELEASE ORAL DAILY
Qty: 90 TABLET | Refills: 3 | Status: SHIPPED | OUTPATIENT
Start: 2020-11-25 | End: 2021-09-03

## 2020-11-25 RX ORDER — METOPROLOL SUCCINATE 25 MG/1
12.5 TABLET, EXTENDED RELEASE ORAL DAILY
Qty: 90 TABLET | Refills: 3 | Status: SHIPPED | OUTPATIENT
Start: 2020-11-25 | End: 2020-11-25

## 2020-11-25 RX ORDER — AMLODIPINE BESYLATE 2.5 MG/1
2.5 TABLET ORAL DAILY
Qty: 90 TABLET | Refills: 3 | Status: SHIPPED | OUTPATIENT
Start: 2020-11-25 | End: 2020-11-25

## 2020-11-25 NOTE — TELEPHONE ENCOUNTER
[facility] Call Center    Phone Message    May a detailed message be left on voicemail: no     Reason for Call: Medication Question or concern regarding medication   Prescription Clarification  Name of Medication:   metoprolol succinate ER (TOPROL XL) 25 MG 24 hr tablet  amLODIPine (NORVASC) 2.5 MG tablet  Prescribing Provider: Dr. Kash Ferrari MD   Pharmacy: [facility] - [place] [address] == [phone]   What on the order needs clarification? E-Prescribe did not go through.  High  Priority, Pt is waiting for prescriptions (2).          Action Taken: Message routed to:  [facility] Cardiology    Travel Screening: Not Applicable

## 2021-04-03 ENCOUNTER — HEALTH MAINTENANCE LETTER (OUTPATIENT)
Age: 71
End: 2021-04-03

## 2021-07-29 ENCOUNTER — MYC MEDICAL ADVICE (OUTPATIENT)
Dept: INTERNAL MEDICINE | Facility: CLINIC | Age: 71
End: 2021-07-29

## 2021-07-29 DIAGNOSIS — Z20.822 COVID-19 RULED OUT: Primary | ICD-10-CM

## 2021-08-03 ENCOUNTER — LAB (OUTPATIENT)
Dept: LAB | Facility: CLINIC | Age: 71
End: 2021-08-03

## 2021-08-03 DIAGNOSIS — Z20.822 COVID-19 RULED OUT: ICD-10-CM

## 2021-08-03 PROCEDURE — 36415 COLL VENOUS BLD VENIPUNCTURE: CPT | Performed by: PATHOLOGY

## 2021-08-03 PROCEDURE — 86769 SARS-COV-2 COVID-19 ANTIBODY: CPT | Mod: 90 | Performed by: PATHOLOGY

## 2021-08-05 ENCOUNTER — MYC MEDICAL ADVICE (OUTPATIENT)
Dept: INTERNAL MEDICINE | Facility: CLINIC | Age: 71
End: 2021-08-05

## 2021-08-05 LAB
SARS-COV-2 AB SERPL IA-ACNC: 3.6 U/ML
SARS-COV-2 AB SERPL-IMP: POSITIVE

## 2021-08-10 ENCOUNTER — MYC MEDICAL ADVICE (OUTPATIENT)
Dept: INTERNAL MEDICINE | Facility: CLINIC | Age: 71
End: 2021-08-10

## 2021-09-03 ENCOUNTER — OFFICE VISIT (OUTPATIENT)
Dept: INTERNAL MEDICINE | Facility: CLINIC | Age: 71
End: 2021-09-03
Payer: COMMERCIAL

## 2021-09-03 VITALS
BODY MASS INDEX: 25.62 KG/M2 | SYSTOLIC BLOOD PRESSURE: 109 MMHG | WEIGHT: 166 LBS | DIASTOLIC BLOOD PRESSURE: 71 MMHG | HEART RATE: 66 BPM | OXYGEN SATURATION: 95 %

## 2021-09-03 DIAGNOSIS — Z87.891 PERSONAL HISTORY OF TOBACCO USE: ICD-10-CM

## 2021-09-03 DIAGNOSIS — I21.4 NSTEMI (NON-ST ELEVATED MYOCARDIAL INFARCTION) (H): ICD-10-CM

## 2021-09-03 DIAGNOSIS — I25.10 CORONARY ARTERY DISEASE INVOLVING NATIVE CORONARY ARTERY OF NATIVE HEART WITHOUT ANGINA PECTORIS: ICD-10-CM

## 2021-09-03 DIAGNOSIS — Z12.11 SPECIAL SCREENING FOR MALIGNANT NEOPLASMS, COLON: ICD-10-CM

## 2021-09-03 DIAGNOSIS — R79.9 ABNORMAL FINDING OF BLOOD CHEMISTRY, UNSPECIFIED: ICD-10-CM

## 2021-09-03 DIAGNOSIS — K21.00 GASTROESOPHAGEAL REFLUX DISEASE WITH ESOPHAGITIS WITHOUT HEMORRHAGE: Primary | ICD-10-CM

## 2021-09-03 PROCEDURE — 99214 OFFICE O/P EST MOD 30 MIN: CPT | Performed by: INTERNAL MEDICINE

## 2021-09-03 RX ORDER — AMLODIPINE BESYLATE 2.5 MG/1
2.5 TABLET ORAL DAILY
Qty: 90 TABLET | Refills: 3 | Status: SHIPPED | OUTPATIENT
Start: 2021-09-03 | End: 2022-09-28

## 2021-09-03 RX ORDER — ATORVASTATIN CALCIUM 20 MG/1
20 TABLET, FILM COATED ORAL DAILY
Qty: 90 TABLET | Refills: 3 | Status: SHIPPED | OUTPATIENT
Start: 2021-09-03 | End: 2022-09-28

## 2021-09-03 RX ORDER — METOPROLOL SUCCINATE 25 MG/1
12.5 TABLET, EXTENDED RELEASE ORAL DAILY
Qty: 90 TABLET | Refills: 3 | Status: SHIPPED | OUTPATIENT
Start: 2021-09-03 | End: 2022-09-28

## 2021-09-03 ASSESSMENT — PAIN SCALES - GENERAL: PAINLEVEL: NO PAIN (0)

## 2021-09-03 NOTE — PATIENT INSTRUCTIONS

## 2021-09-03 NOTE — NURSING NOTE
Chief Complaint   Patient presents with     Recheck Medication     check in lung cancer screening (long time smoker quit now) discuss side effects of mini heart attacj that happend in 2019, general check in since living in [place] for a year to take care of his mother, new prescription/refill on meds, get his labs done (a1c, lipid panel, bmp)       Glenny Coates, EMT at 2:41 PM on 9/3/2021

## 2021-09-03 NOTE — PROGRESS NOTES
HPI  71-year-old returns today with his wife for reevaluation.  He has continued to refrain from smoking.  He is also exercising more now.  He is tolerating this well without chest pain dyspnea or other symptoms or complaints.  His diet is healthy and is sleeping well.  He felt poorly on the 25 mg of metoprolol twice a day and reduce the dose down to 12.5 twice a day and he feels better at this dose.  He also reduced his atorvastatin down to 20 mg daily.  He is continuing to take the aspirin.  He has had no recent reflux symptoms on the omeprazole and will switch to over-the-counter omeprazole.  Past Medical History:   Diagnosis Date     Gastric ulcer      Heart attack (H)      Hyperlipidemia      Smoker      Past Surgical History:   Procedure Laterality Date     ESOPHAGOSCOPY, GASTROSCOPY, DUODENOSCOPY (EGD), COMBINED  6/30/2014    Procedure: COMBINED ESOPHAGOSCOPY, GASTROSCOPY, DUODENOSCOPY (EGD);  Surgeon: Stone Wan MD;  Location: [facility]     NO HISTORY OF SURGERY       Family History   Problem Relation Age of Onset     Hypertension Father         sibling     Heart Disease Father      Glaucoma No family hx of      Macular Degeneration No family hx of      Social History     Socioeconomic History     Marital status:      Spouse name: Not on file     Number of children: Not on file     Years of education: Not on file     Highest education level: Not on file   Occupational History     Not on file   Tobacco Use     Smoking status: Former Smoker     Packs/day: 1.00     Types: Cigarettes     Quit date: 9/5/2017     Years since quitting: 3.9     Smokeless tobacco: Never Used     Tobacco comment: 40 - 80 pack year history.  Smoked up to two packs per day. Trying to cut back   Substance and Sexual Activity     Alcohol use: No     Drug use: No     Sexual activity: Not on file   Other Topics Concern     Parent/sibling w/ CABG, MI or angioplasty before 65F 55M? Not Asked   Social History Narrative     Not on file      Social Determinants of Health     Financial Resource Strain:      Difficulty of Paying Living Expenses:    Food Insecurity:      Worried About Running Out of Food in the Last Year:      Ran Out of Food in the Last Year:    Transportation Needs:      Lack of Transportation (Medical):      Lack of Transportation (Non-Medical):    Physical Activity:      Days of Exercise per Week:      Minutes of Exercise per Session:    Stress:      Feeling of Stress :    Social Connections:      Frequency of Communication with Friends and Family:      Frequency of Social Gatherings with Friends and Family:      Attends Congregation Services:      Active Member of Clubs or Organizations:      Attends Club or Organization Meetings:      Marital Status:    Intimate Partner Violence:      Fear of Current or Ex-Partner:      Emotionally Abused:      Physically Abused:      Sexually Abused:        Exam:  /71 (BP Location: Right arm, Patient Position: Sitting, Cuff Size: Adult Regular)   Pulse 66   Wt 75.3 kg (166 lb)   SpO2 95%   BMI 25.62 kg/m    166 lbs 0 oz  The patient is alert, oriented with a clear sensorium.   Skin shows no lesions or rashes and good turgor.   Head is normocephalic and atraumatic.    Neck shows no nodes, thyromegaly.     Lungs are clear.   Heart shows normal S1 and S2 without murmur or gallop.    Extremities show no edema.      ASSESSMENT  1 history STEMI 2019 with single-vessel disease S/P ballon angioplasty presently asymptomatic  2 hyperlipidemia on reduced atorvastatin dose of 20 mg needs reassessment  3 hypertension appears well controlled  4 former smoker needs lung cancer screening  5 GERD    Plan  Follow-up for fasting labs continue on the present medications.  This note was completed using Dragon voice recognition software.    Over 30 minutes spent on the day of service in chart review, patient contact, record completion and review and notification of lab reports    Paulino Harper MD  General  Internal Medicine  [facility]  [phone]        Lung Cancer Screening Shared Decision Making Visit     Silke Moralez is eligible for lung cancer screening on the basis of the information provided in my signed lung cancer screening order.     I have discussed with patient the risks and benefits of screening for lung cancer with low-dose CT.     The risks include:  radiation exposure: one low dose chest CT has as much ionizing radiation as about 15 chest x-rays or 6 months of background radiation living in [place]    false positives: 96% of positive findings/nodules are NOT cancer, but some might still require additional diagnostic evaluation, including biopsy  over-diagnosis: some slow growing cancers that might never have been clinically significant will be detected and treated unnecessarily     The benefit of early detection of lung cancer is contingent upon adherence to annual screening or more frequent follow up if indicated.     Furthermore, reaping the benefits of screening requires Silke Moralez to be willing and physically able to undergo diagnostic procedures, if indicated. Although no specific guide is available for determining severity of comorbidities, it is reasonable to withhold screening in patients who have greater mortality risk from other diseases.     We did discuss that the only way to prevent lung cancer is to not smoke. Smoking cessation counseling was not given.      I did not offer risk estimation using a calculator such as this one:    ShouldIScreen

## 2021-09-08 ENCOUNTER — LAB (OUTPATIENT)
Dept: LAB | Facility: CLINIC | Age: 71
End: 2021-09-08

## 2021-09-08 ENCOUNTER — MYC MEDICAL ADVICE (OUTPATIENT)
Dept: CARDIOLOGY | Facility: CLINIC | Age: 71
End: 2021-09-08

## 2021-09-08 ENCOUNTER — ANCILLARY PROCEDURE (OUTPATIENT)
Dept: CT IMAGING | Facility: CLINIC | Age: 71
End: 2021-09-08
Attending: INTERNAL MEDICINE
Payer: COMMERCIAL

## 2021-09-08 ENCOUNTER — MYC MEDICAL ADVICE (OUTPATIENT)
Dept: INTERNAL MEDICINE | Facility: CLINIC | Age: 71
End: 2021-09-08

## 2021-09-08 DIAGNOSIS — K21.00 GASTROESOPHAGEAL REFLUX DISEASE WITH ESOPHAGITIS WITHOUT HEMORRHAGE: ICD-10-CM

## 2021-09-08 DIAGNOSIS — I25.10 CORONARY ARTERY DISEASE INVOLVING NATIVE CORONARY ARTERY OF NATIVE HEART WITHOUT ANGINA PECTORIS: ICD-10-CM

## 2021-09-08 DIAGNOSIS — Z87.891 PERSONAL HISTORY OF TOBACCO USE: ICD-10-CM

## 2021-09-08 DIAGNOSIS — R79.9 ABNORMAL FINDING OF BLOOD CHEMISTRY, UNSPECIFIED: ICD-10-CM

## 2021-09-08 LAB
ALBUMIN SERPL-MCNC: 3.8 G/DL (ref 3.4–5)
ALP SERPL-CCNC: 61 U/L (ref 40–150)
ALT SERPL W P-5'-P-CCNC: 28 U/L (ref 0–70)
ANION GAP SERPL CALCULATED.3IONS-SCNC: 5 MMOL/L (ref 3–14)
AST SERPL W P-5'-P-CCNC: 21 U/L (ref 0–45)
BASOPHILS # BLD AUTO: 0 10E3/UL (ref 0–0.2)
BASOPHILS NFR BLD AUTO: 0 %
BILIRUB SERPL-MCNC: 0.9 MG/DL (ref 0.2–1.3)
BUN SERPL-MCNC: 17 MG/DL (ref 7–30)
CALCIUM SERPL-MCNC: 8.8 MG/DL (ref 8.5–10.1)
CHLORIDE BLD-SCNC: 112 MMOL/L (ref 94–109)
CHOLEST SERPL-MCNC: 128 MG/DL
CO2 SERPL-SCNC: 27 MMOL/L (ref 20–32)
CREAT SERPL-MCNC: 0.91 MG/DL (ref 0.66–1.25)
EOSINOPHIL # BLD AUTO: 0.2 10E3/UL (ref 0–0.7)
EOSINOPHIL NFR BLD AUTO: 4 %
ERYTHROCYTE [DISTWIDTH] IN BLOOD BY AUTOMATED COUNT: 11.5 % (ref 10–15)
GFR SERPL CREATININE-BSD FRML MDRD: 84 ML/MIN/1.73M2
GLUCOSE BLD-MCNC: 92 MG/DL (ref 70–99)
HBA1C MFR BLD: 5.6 % (ref 0–5.6)
HCT VFR BLD AUTO: 39.7 % (ref 40–53)
HDLC SERPL-MCNC: 48 MG/DL
HGB BLD-MCNC: 13.8 G/DL (ref 13.3–17.7)
IMM GRANULOCYTES # BLD: 0 10E3/UL
IMM GRANULOCYTES NFR BLD: 0 %
LDLC SERPL CALC-MCNC: 64 MG/DL
LYMPHOCYTES # BLD AUTO: 2.5 10E3/UL (ref 0.8–5.3)
LYMPHOCYTES NFR BLD AUTO: 48 %
MCH RBC QN AUTO: 32.9 PG (ref 26.5–33)
MCHC RBC AUTO-ENTMCNC: 34.8 G/DL (ref 31.5–36.5)
MCV RBC AUTO: 95 FL (ref 78–100)
MONOCYTES # BLD AUTO: 0.5 10E3/UL (ref 0–1.3)
MONOCYTES NFR BLD AUTO: 10 %
NEUTROPHILS # BLD AUTO: 2 10E3/UL (ref 1.6–8.3)
NEUTROPHILS NFR BLD AUTO: 38 %
NONHDLC SERPL-MCNC: 80 MG/DL
NRBC # BLD AUTO: 0 10E3/UL
NRBC BLD AUTO-RTO: 0 /100
PLATELET # BLD AUTO: 119 10E3/UL (ref 150–450)
POTASSIUM BLD-SCNC: 4.1 MMOL/L (ref 3.4–5.3)
PROT SERPL-MCNC: 7.5 G/DL (ref 6.8–8.8)
RBC # BLD AUTO: 4.19 10E6/UL (ref 4.4–5.9)
SODIUM SERPL-SCNC: 144 MMOL/L (ref 133–144)
TRIGL SERPL-MCNC: 82 MG/DL
WBC # BLD AUTO: 5.2 10E3/UL (ref 4–11)

## 2021-09-08 PROCEDURE — 36415 COLL VENOUS BLD VENIPUNCTURE: CPT | Performed by: PATHOLOGY

## 2021-09-08 PROCEDURE — 71271 CT THORAX LUNG CANCER SCR C-: CPT | Performed by: RADIOLOGY

## 2021-09-08 PROCEDURE — 85025 COMPLETE CBC W/AUTO DIFF WBC: CPT | Performed by: PATHOLOGY

## 2021-09-08 PROCEDURE — 80053 COMPREHEN METABOLIC PANEL: CPT | Performed by: PATHOLOGY

## 2021-09-08 PROCEDURE — 80061 LIPID PANEL: CPT | Performed by: PATHOLOGY

## 2021-09-08 PROCEDURE — 83036 HEMOGLOBIN GLYCOSYLATED A1C: CPT | Performed by: PATHOLOGY

## 2021-09-12 ENCOUNTER — HEALTH MAINTENANCE LETTER (OUTPATIENT)
Age: 71
End: 2021-09-12

## 2021-09-17 ENCOUNTER — OFFICE VISIT (OUTPATIENT)
Dept: CARDIOLOGY | Facility: CLINIC | Age: 71
End: 2021-09-17
Attending: NURSE PRACTITIONER
Payer: COMMERCIAL

## 2021-09-17 VITALS
OXYGEN SATURATION: 95 % | HEIGHT: 67 IN | SYSTOLIC BLOOD PRESSURE: 113 MMHG | BODY MASS INDEX: 25.79 KG/M2 | DIASTOLIC BLOOD PRESSURE: 72 MMHG | HEART RATE: 76 BPM | WEIGHT: 164.3 LBS

## 2021-09-17 DIAGNOSIS — I25.5 ISCHEMIC CARDIOMYOPATHY: ICD-10-CM

## 2021-09-17 DIAGNOSIS — I10 ESSENTIAL HYPERTENSION: ICD-10-CM

## 2021-09-17 DIAGNOSIS — E78.00 PURE HYPERCHOLESTEROLEMIA: ICD-10-CM

## 2021-09-17 DIAGNOSIS — I25.10 CORONARY ARTERY DISEASE INVOLVING NATIVE CORONARY ARTERY OF NATIVE HEART WITHOUT ANGINA PECTORIS: Primary | ICD-10-CM

## 2021-09-17 PROCEDURE — G0463 HOSPITAL OUTPT CLINIC VISIT: HCPCS

## 2021-09-17 PROCEDURE — 99213 OFFICE O/P EST LOW 20 MIN: CPT | Performed by: NURSE PRACTITIONER

## 2021-09-17 ASSESSMENT — MIFFLIN-ST. JEOR: SCORE: 1452.76

## 2021-09-17 ASSESSMENT — PAIN SCALES - GENERAL: PAINLEVEL: NO PAIN (0)

## 2021-09-17 NOTE — LETTER
9/17/2021      RE: Silke Moralez  [address]  [facility] [address]       Dear Colleague,    Thank you for the opportunity to participate in the care of your patient, Silke Moralez, at the [facility]. Please see a copy of my visit note below.          Cardiology Clinic Note    HPI: Silke Moralez is a 71 year old male, being seen today for recheck of CAD. Last seen by Dr. Ferrari 3/2020 at which time he was feeling well.     In review, his cardiac history is notable for an NSTEMI while in [place] for which he underwent a POBA of the diagonal branch. He then had ac ardiac MRI which showed an LAD infarct with preserved EF.     Since his last visit, he continues to feel well. He remains quite active getting 15-30K steps daily. He wonders if he should continue his ASA and stain which we discussed he should. He does occasionally get light headed from his BP meds but mostly he feels fine. BP is well controlled. Eats somewhat healthy with the assistance of his wife. No LE swelling or orthopnea. Has lost weight. No chest pains or SOB, no HF symptoms. No palpitations.      Current Outpatient Medications   Medication Sig Dispense Refill     acetaminophen (TYLENOL) 325 MG tablet Take 325-650 mg by mouth every 6 hours as needed for mild pain       amLODIPine (NORVASC) 2.5 MG tablet Take 1 tablet (2.5 mg) by mouth daily 90 tablet 3     aspirin (ASA) 81 MG EC tablet Take 1 tablet (81 mg) by mouth daily 90 tablet 3     atorvastatin (LIPITOR) 20 MG tablet Take 1 tablet (20 mg) by mouth daily 90 tablet 3     calcium carbonate (TUMS) 500 MG chewable tablet Take 2 chew tab by mouth 2 times daily as needed for heartburn       ibuprofen (ADVIL/MOTRIN) 400 MG tablet Take 400 mg by mouth every 6 hours as needed for moderate pain       metoprolol succinate ER (TOPROL XL) 25 MG 24 hr tablet Take 0.5 tablets (12.5 mg) by mouth daily 90 tablet 3      "omeprazole (PRILOSEC) 40 MG DR capsule Take 1 capsule (40 mg) by mouth 2 times daily (before meals) 200 capsule 3     UNABLE TO FIND 2.5 mg daily MEDICATION NAME: Levamlodipine besylate         Past Medical History:   Diagnosis Date     Gastric ulcer      Heart attack (H)      Hyperlipidemia      Smoker        Past Surgical History:   Procedure Laterality Date     ESOPHAGOSCOPY, GASTROSCOPY, DUODENOSCOPY (EGD), COMBINED  2014    Procedure: COMBINED ESOPHAGOSCOPY, GASTROSCOPY, DUODENOSCOPY (EGD);  Surgeon: Stone Wan MD;  Location:  GI     NO HISTORY OF SURGERY         Family History   Problem Relation Age of Onset     Hypertension Father         sibling     Heart Disease Father      Glaucoma No family hx of      Macular Degeneration No family hx of        Social History     Tobacco Use     Smoking status: Former Smoker     Packs/day: 1.00     Types: Cigarettes     Quit date: 2017     Years since quittin.0     Smokeless tobacco: Never Used     Tobacco comment: 40 - 80 pack year history.  Smoked up to two packs per day. Trying to cut back   Substance Use Topics     Alcohol use: No       No Known Allergies      ROS:   Negative besides that noted in HPI      Physical Examination:  Vitals: /72 (BP Location: Right arm, Patient Position: Chair, Cuff Size: Adult Regular)   Pulse 76   Ht 1.7 m (5' 6.93\")   Wt 74.5 kg (164 lb 4.8 oz)   SpO2 95%   BMI 25.79 kg/m    BMI= There is no height or weight on file to calculate BMI.    GENERAL APPEARANCE: healthy, alert and no distress  HEENT: no icterus  NECK: JVP is not visible  CHEST: lungs clear to auscultation - no rales, rhonchi or wheezes  CARDIOVASCULAR: regular rhythm, normal S1, S2,   EXTREMITIES: no clubbing, cyanosis or edema  NEURO: alert and oriented to person/place/time, normal speech  VASC: Peripheral pulses are normal in volumes and symmetric bilaterally.   SKIN: no ecchymoses, no rashes    Laboratory/imaging:  EK  Sinus " rhythm     ECHO: 7/1/19  Interpretation Summary  Left ventricular size is normal.  The Ejection Fraction is estimated at 45-50%.  Right ventricular function, chamber size, wall motion, and thickness are  normal.  The inferior vena cava is normal.  No pericardial effusion is present.     STRESS TEST:  2007  Interpretation Summary  No stress induced regional wall motion abnormalities.  Normal resting LV function with EF of approximately 60-65%; normal response   to exercise with increase to approximately 70-75%.  No ECG evidence of ischemia.  No subjective evidence of ischemia.  Normal functional capacity.  The patient exhibited a hypertensive response with stress.  PatientHeight: 67 in  PatientWeight: 170 lbs  BSA 1.9 m^2     CARDIAC CATH:  May 2019         Assessment:  # CAD, NSTEMI in [place], no stents placed  Continued on ASA. He is now on moderate intensity statin because of side effects  -- BB for five years,as tolerated, 12.5 mg now due to side effects of lightheadedness     2. Cardiomyopathy - normalized at 52%  -- continue BB as above, unable to take ACEI or ARB due to side effects     3. Hypertension, well controlled  -- BB and CCB. Could discontinue Norvasc if needed for light headedness     4. Hyperlipidemia, previously well controlled, we'll repeat a lipid panel tomorrow  -- continue statin      Recommendations:  Follow up one year  Continued meds and excellent risk factor modification as currently.     ADELA Ziegler, CNP  [facility]  [phone]

## 2021-09-17 NOTE — NURSING NOTE
Chief Complaint   Patient presents with     Follow Up     follow up     Vitals were taken and medications were reconciled.    Jose Palacios, EMT  2:49 PM

## 2021-09-17 NOTE — PROGRESS NOTES
Cardiology Clinic Note    HPI: Silke Moralez is a 71 year old male, being seen today for recheck of CAD. Last seen by Dr. Ferrari 3/2020 at which time he was feeling well.     In review, his cardiac history is notable for an NSTEMI while in [place] for which he underwent a POBA of the diagonal branch. He then had ac ardiac MRI which showed an LAD infarct with preserved EF.     Since his last visit, he continues to feel well. He remains quite active getting 15-30K steps daily. He wonders if he should continue his ASA and stain which we discussed he should. He does occasionally get light headed from his BP meds but mostly he feels fine. BP is well controlled. Eats somewhat healthy with the assistance of his wife. No LE swelling or orthopnea. Has lost weight. No chest pains or SOB, no HF symptoms. No palpitations.      Current Outpatient Medications   Medication Sig Dispense Refill     acetaminophen (TYLENOL) 325 MG tablet Take 325-650 mg by mouth every 6 hours as needed for mild pain       amLODIPine (NORVASC) 2.5 MG tablet Take 1 tablet (2.5 mg) by mouth daily 90 tablet 3     aspirin (ASA) 81 MG EC tablet Take 1 tablet (81 mg) by mouth daily 90 tablet 3     atorvastatin (LIPITOR) 20 MG tablet Take 1 tablet (20 mg) by mouth daily 90 tablet 3     calcium carbonate (TUMS) 500 MG chewable tablet Take 2 chew tab by mouth 2 times daily as needed for heartburn       ibuprofen (ADVIL/MOTRIN) 400 MG tablet Take 400 mg by mouth every 6 hours as needed for moderate pain       metoprolol succinate ER (TOPROL XL) 25 MG 24 hr tablet Take 0.5 tablets (12.5 mg) by mouth daily 90 tablet 3     omeprazole (PRILOSEC) 40 MG DR capsule Take 1 capsule (40 mg) by mouth 2 times daily (before meals) 200 capsule 3     UNABLE TO FIND 2.5 mg daily MEDICATION NAME: Levamlodipine besylate         Past Medical History:   Diagnosis Date     Gastric ulcer      Heart attack (H)      Hyperlipidemia      Smoker        Past Surgical History:  "  Procedure Laterality Date     ESOPHAGOSCOPY, GASTROSCOPY, DUODENOSCOPY (EGD), COMBINED  2014    Procedure: COMBINED ESOPHAGOSCOPY, GASTROSCOPY, DUODENOSCOPY (EGD);  Surgeon: Stone Wan MD;  Location:  GI     NO HISTORY OF SURGERY         Family History   Problem Relation Age of Onset     Hypertension Father         sibling     Heart Disease Father      Glaucoma No family hx of      Macular Degeneration No family hx of        Social History     Tobacco Use     Smoking status: Former Smoker     Packs/day: 1.00     Types: Cigarettes     Quit date: 2017     Years since quittin.0     Smokeless tobacco: Never Used     Tobacco comment: 40 - 80 pack year history.  Smoked up to two packs per day. Trying to cut back   Substance Use Topics     Alcohol use: No       No Known Allergies      ROS:   Negative besides that noted in HPI      Physical Examination:  Vitals: /72 (BP Location: Right arm, Patient Position: Chair, Cuff Size: Adult Regular)   Pulse 76   Ht 1.7 m (5' 6.93\")   Wt 74.5 kg (164 lb 4.8 oz)   SpO2 95%   BMI 25.79 kg/m    BMI= There is no height or weight on file to calculate BMI.    GENERAL APPEARANCE: healthy, alert and no distress  HEENT: no icterus  NECK: JVP is not visible  CHEST: lungs clear to auscultation - no rales, rhonchi or wheezes  CARDIOVASCULAR: regular rhythm, normal S1, S2,   EXTREMITIES: no clubbing, cyanosis or edema  NEURO: alert and oriented to person/place/time, normal speech  VASC: Peripheral pulses are normal in volumes and symmetric bilaterally.   SKIN: no ecchymoses, no rashes    Laboratory/imaging:  EK  Sinus rhythm     ECHO: 19  Interpretation Summary  Left ventricular size is normal.  The Ejection Fraction is estimated at 45-50%.  Right ventricular function, chamber size, wall motion, and thickness are  normal.  The inferior vena cava is normal.  No pericardial effusion is present.     STRESS TEST:    Interpretation Summary  No stress " induced regional wall motion abnormalities.  Normal resting LV function with EF of approximately 60-65%; normal response   to exercise with increase to approximately 70-75%.  No ECG evidence of ischemia.  No subjective evidence of ischemia.  Normal functional capacity.  The patient exhibited a hypertensive response with stress.  PatientHeight: 67 in  PatientWeight: 170 lbs  BSA 1.9 m^2     CARDIAC CATH:  May 2019         Assessment:  # CAD, NSTEMI in [place], no stents placed  Continued on ASA. He is now on moderate intensity statin because of side effects  -- BB for five years,as tolerated, 12.5 mg now due to side effects of lightheadedness     2. Cardiomyopathy - normalized at 52%  -- continue BB as above, unable to take ACEI or ARB due to side effects     3. Hypertension, well controlled  -- BB and CCB. Could discontinue Norvasc if needed for light headedness     4. Hyperlipidemia, previously well controlled, we'll repeat a lipid panel tomorrow  -- continue statin      Recommendations:  Follow up one year  Continued meds and excellent risk factor modification as currently.     Yarelis Gant, APRN, CNP  [facility]  [phone]

## 2022-04-24 ENCOUNTER — HEALTH MAINTENANCE LETTER (OUTPATIENT)
Age: 72
End: 2022-04-24

## 2022-09-28 ENCOUNTER — OFFICE VISIT (OUTPATIENT)
Dept: INTERNAL MEDICINE | Facility: CLINIC | Age: 72
End: 2022-09-28
Payer: COMMERCIAL

## 2022-09-28 VITALS
HEART RATE: 69 BPM | BODY MASS INDEX: 25.9 KG/M2 | SYSTOLIC BLOOD PRESSURE: 148 MMHG | DIASTOLIC BLOOD PRESSURE: 78 MMHG | OXYGEN SATURATION: 97 % | WEIGHT: 165 LBS | HEIGHT: 67 IN

## 2022-09-28 DIAGNOSIS — F17.200 SMOKER: Primary | ICD-10-CM

## 2022-09-28 DIAGNOSIS — I21.4 NSTEMI (NON-ST ELEVATED MYOCARDIAL INFARCTION) (H): ICD-10-CM

## 2022-09-28 DIAGNOSIS — I25.10 CORONARY ARTERY DISEASE INVOLVING NATIVE CORONARY ARTERY OF NATIVE HEART WITHOUT ANGINA PECTORIS: ICD-10-CM

## 2022-09-28 DIAGNOSIS — Z87.891 PERSONAL HISTORY OF NICOTINE DEPENDENCE: ICD-10-CM

## 2022-09-28 DIAGNOSIS — R49.0 HOARSENESS: ICD-10-CM

## 2022-09-28 PROCEDURE — 90677 PCV20 VACCINE IM: CPT | Performed by: INTERNAL MEDICINE

## 2022-09-28 PROCEDURE — 99397 PER PM REEVAL EST PAT 65+ YR: CPT | Mod: 25 | Performed by: INTERNAL MEDICINE

## 2022-09-28 PROCEDURE — G0009 ADMIN PNEUMOCOCCAL VACCINE: HCPCS | Performed by: INTERNAL MEDICINE

## 2022-09-28 RX ORDER — METOPROLOL SUCCINATE 25 MG/1
12.5 TABLET, EXTENDED RELEASE ORAL DAILY
Qty: 90 TABLET | Refills: 3 | Status: SHIPPED | OUTPATIENT
Start: 2022-09-28 | End: 2023-09-12

## 2022-09-28 RX ORDER — ATORVASTATIN CALCIUM 20 MG/1
20 TABLET, FILM COATED ORAL DAILY
Qty: 90 TABLET | Refills: 3 | Status: SHIPPED | OUTPATIENT
Start: 2022-09-28 | End: 2023-08-31

## 2022-09-28 RX ORDER — AMLODIPINE BESYLATE 5 MG/1
5 TABLET ORAL AT BEDTIME
Qty: 100 TABLET | Refills: 3 | Status: SHIPPED | OUTPATIENT
Start: 2022-09-28 | End: 2023-08-31

## 2022-09-28 ASSESSMENT — PAIN SCALES - GENERAL: PAINLEVEL: NO PAIN (0)

## 2022-09-28 NOTE — NURSING NOTE
Chief Complaint   Patient presents with     RECHECK     Follow up after being locked down in [place], refills, lung cancer CT scan.       Heather Prasad CMA, EMT at 3:39 PM on 9/28/2022.

## 2022-09-28 NOTE — PROGRESS NOTES
"HPI  72-year-old returns today with his wife after being quarantined in [place] due to the lockdown.  He is continue to have intermittent hoarseness.  Apparently at one point he had a laryngoscope he was suggested the possibility of GERD as contributing to this.  There is been minimal heartburn he is tolerating the omeprazole well.  His blood pressure at home has been running in the 130s over 80s by his report.  Not any chest pain or other complaints.  No recent laboratory evaluation he quit smoking in 2019  Past Medical History:   Diagnosis Date     Gastric ulcer      Heart attack (H)      Hyperlipidemia      Smoker      Past Surgical History:   Procedure Laterality Date     ESOPHAGOSCOPY, GASTROSCOPY, DUODENOSCOPY (EGD), COMBINED  6/30/2014    Procedure: COMBINED ESOPHAGOSCOPY, GASTROSCOPY, DUODENOSCOPY (EGD);  Surgeon: Stone Wan MD;  Location:  GI     NO HISTORY OF SURGERY       Family History   Problem Relation Age of Onset     Hypertension Father         sibling     Heart Disease Father      Glaucoma No family hx of      Macular Degeneration No family hx of          Exam:  BP (!) 148/78 (BP Location: Right arm, Patient Position: Sitting, Cuff Size: Adult Regular)   Pulse 69   Ht 1.7 m (5' 6.93\")   Wt 74.8 kg (165 lb)   SpO2 97%   BMI 25.90 kg/m    165 lbs 0 oz  The patient is alert, oriented with a clear sensorium.   Skin shows no lesions or rashes and good turgor.   Head is normocephalic and atraumatic.    Neck shows no nodes, thyromegaly.     Lungs are clear.   Heart shows normal S1 and S2 without murmur or gallop.    Extremities show no edema.        ASSESSMENT  1 CAD with STEMI 2019 with single-vessel disease S/P PCI  2 hyperlipidemia on atorvastatin 20 mg  3 hypertension controlled at home   4 former smoker needs lung cancer screening  5 GERD/gastritis  6 Hoarseness ? Related to above    Plan  Will update his lung cancer screening this year.  Will refer to ENT for evaluation of the hoarseness.  We " will follow-up with fasting lipids and a BMP.  In light of his blood pressure we will increase the amlodipine to 5 mg at bedtime.  Continue the omeprazole atorvastatin and metoprolol.  We will update his immunizations with a Prevnar 20    This note was completed using Dragon voice recognition software.      Paulino Harper MD  General Internal Medicine  [facility]  [phone]

## 2022-09-30 NOTE — TELEPHONE ENCOUNTER
FUTURE VISIT INFORMATION      FUTURE VISIT INFORMATION:    Date: 1/12/23    Time:  4:00PM with SLP    [place] [facility]  REFERRAL INFORMATION:    Referring provider: Dr. Paulino Harper    Referring providers clinic: [facility] - Primary Care    Reason for visit/diagnosis: Hoarseness    RECORDS REQUESTED FROM:       Clinic name Comments Records Status Imaging Status   eal 9/28/22, 9/3/21 - PCC OV with Dr. Harper  3/3/14 - ENT OV with [facility] 6/4/14 [facility] 8/30[facility]17 - PFT  6/30/14 - EGD [facility] - Imaging ([facility]) 10/5/22 - CT Chest  9/8/21 - CT Chest  3/3/20 - MRI Cardiac  8/24/17 - CT Chest  2/19/14 - US Head/Neck [facility] PACs   ENT Specialty Care 5/12/20 - ENT OV with Dr. Wetzel Scanned In

## 2022-10-04 ENCOUNTER — LAB (OUTPATIENT)
Dept: LAB | Facility: CLINIC | Age: 72
End: 2022-10-04
Payer: COMMERCIAL

## 2022-10-04 DIAGNOSIS — I21.4 NSTEMI (NON-ST ELEVATED MYOCARDIAL INFARCTION) (H): ICD-10-CM

## 2022-10-04 LAB
ANION GAP SERPL CALCULATED.3IONS-SCNC: 9 MMOL/L (ref 7–15)
BUN SERPL-MCNC: 13.9 MG/DL (ref 8–23)
CALCIUM SERPL-MCNC: 10 MG/DL (ref 8.8–10.2)
CHLORIDE SERPL-SCNC: 104 MMOL/L (ref 98–107)
CHOLEST SERPL-MCNC: 128 MG/DL
CREAT SERPL-MCNC: 0.93 MG/DL (ref 0.67–1.17)
DEPRECATED HCO3 PLAS-SCNC: 28 MMOL/L (ref 22–29)
GFR SERPL CREATININE-BSD FRML MDRD: 87 ML/MIN/1.73M2
GLUCOSE SERPL-MCNC: 105 MG/DL (ref 70–99)
HDLC SERPL-MCNC: 43 MG/DL
LDLC SERPL CALC-MCNC: 57 MG/DL
NONHDLC SERPL-MCNC: 85 MG/DL
POTASSIUM SERPL-SCNC: 4.9 MMOL/L (ref 3.4–5.3)
SODIUM SERPL-SCNC: 141 MMOL/L (ref 136–145)
TRIGL SERPL-MCNC: 140 MG/DL

## 2022-10-04 PROCEDURE — 80048 BASIC METABOLIC PNL TOTAL CA: CPT | Performed by: PATHOLOGY

## 2022-10-04 PROCEDURE — 36415 COLL VENOUS BLD VENIPUNCTURE: CPT | Performed by: PATHOLOGY

## 2022-10-04 PROCEDURE — 80061 LIPID PANEL: CPT | Performed by: INTERNAL MEDICINE

## 2022-10-13 ENCOUNTER — TRANSFERRED RECORDS (OUTPATIENT)
Dept: HEALTH INFORMATION MANAGEMENT | Facility: CLINIC | Age: 72
End: 2022-10-13

## 2022-10-30 ENCOUNTER — TELEPHONE (OUTPATIENT)
Dept: NURSING | Facility: CLINIC | Age: 72
End: 2022-10-30

## 2022-10-30 NOTE — TELEPHONE ENCOUNTER
Pt's wife, Chloe, called stating Kirt tested positive for COVID. He has been sleeping all day yesterday and throwing up today. She gave him tylenol. Unfortunately, I do not have CTC on file and she does not want to wake him up. I transferred her to the COVID line.    Aditi Yang RN  [facility] Nurse Advisor   10/30/2022  3:23 PM

## 2022-10-31 ENCOUNTER — VIRTUAL VISIT (OUTPATIENT)
Dept: FAMILY MEDICINE | Facility: CLINIC | Age: 72
End: 2022-10-31
Payer: COMMERCIAL

## 2022-10-31 ENCOUNTER — TELEPHONE (OUTPATIENT)
Dept: INTERNAL MEDICINE | Facility: CLINIC | Age: 72
End: 2022-10-31

## 2022-10-31 DIAGNOSIS — U07.1 INFECTION DUE TO 2019 NOVEL CORONAVIRUS: Primary | ICD-10-CM

## 2022-10-31 PROCEDURE — 99213 OFFICE O/P EST LOW 20 MIN: CPT | Mod: 95 | Performed by: NURSE PRACTITIONER

## 2022-10-31 NOTE — PROGRESS NOTES
"Silke is a 72 year old who is being evaluated via a billable video visit.      How would you like to obtain your AVS? MyChart  If the video visit is dropped, the invitation should be resent by: Text to cell phone: [phone]  Will anyone else be joining your video visit?           Assessment & Plan     (U07.1) Infection due to 2019 novel coronavirus  (primary encounter diagnosis)  Comment:   Plan: nirmatrelvir and ritonavir (PAXLOVID) therapy         pack               BMI:   Estimated body mass index is 25.9 kg/m  as calculated from the following:    Height as of 9/28/22: 1.7 m (5' 6.93\").    Weight as of 9/28/22: 74.8 kg (165 lb).       See Patient Instructions    No follow-ups on file.    ADELA Cruz CNP  [facility]    Subjective   Silke is a 72 year old accompanied by his spouse, presenting for the following health issues:  No chief complaint on file.      HPI       COVID-19 Symptom Review  How many days ago did these symptoms start?  11/28/2022 positive  on Saturday    Are any of the following symptoms significant for you?    New or worsening difficulty breathing? No    Worsening cough? No    Fever or chills? Yes     Headache: No    Sore throat: YES    Chest pain: No    Diarrhea: No    Body aches? YES-     What treatments has patient tried? Acetaminophen   Does patient live in a nursing home, group home, or shelter? No  Does patient have a way to get food/medications during quarantined? Yes, I have a friend or family member who can help me.      Review of Systems   Constitutional, HEENT, cardiovascular, pulmonary, gi and gu systems are negative, except as otherwise noted.      Objective           Vitals:  No vitals were obtained today due to virtual visit.    Physical Exam   GENERAL: Healthy, alert and no distress  EYES: Eyes grossly normal to inspection.  No discharge or erythema, or obvious scleral/conjunctival abnormalities.  RESP: No audible wheeze, cough, or " visible cyanosis.  No visible retractions or increased work of breathing.    SKIN: Visible skin clear. No significant rash, abnormal pigmentation or lesions.  NEURO: Cranial nerves grossly intact.  Mentation and speech appropriate for age.  PSYCH: Mentation appears normal, affect normal/bright, judgement and insight intact, normal speech and appearance well-groomed.    No results found for any visits on 10/31/22.            Video-Visit Details unable to connect via viedio telephone 15 mins     Video Start Time: 11:50 AM    Type of service:  Video Visit    Video End Time:12:05 AM    Originating Location (pt. Location): Home        Distant Location (provider location):  On-site    Platform used for Video Visit: [employer]

## 2022-10-31 NOTE — TELEPHONE ENCOUNTER
[facility] Call Center    Phone Message    May a detailed message be left on voicemail: yes     Reason for Call: Other: Patients spouse calling stating patient has been dignosed with covid and is getting prescribed paxlovid but was told to discuss with his primary care provider because of his cardiovascular medications prior to taking that medication please call thank you.      Action Taken: Message routed to:  [facility] ([facility]    Travel Screening: Not Applicable                                              Should she test positive for COVID he also should receive a course of paxlovid.  He would need to hold only the atorvastatin while he takes the paxlovid  If you have any other questions regarding this don't hesitate to contact us.  JL

## 2022-11-01 NOTE — TELEPHONE ENCOUNTER
Spoke with patients wife, informed her of Dr. Harper's recommendation for patients medications while taking Paxlovid.      Sariah Iraheta RN on 11/1/2022 at 10:09 AM

## 2022-11-08 ENCOUNTER — MYC MEDICAL ADVICE (OUTPATIENT)
Dept: INTERNAL MEDICINE | Facility: CLINIC | Age: 72
End: 2022-11-08

## 2022-11-09 ENCOUNTER — NURSE TRIAGE (OUTPATIENT)
Dept: NURSING | Facility: CLINIC | Age: 72
End: 2022-11-09

## 2022-11-09 NOTE — TELEPHONE ENCOUNTER
Pt has some general questions about COVID that writer was able to answer     No triage     Pennie Taylor, RN  [facility] Nurse Advisor  1:16 PM 11/9/2022      Reason for Disposition    General information question, no triage required and triager able to answer question    Additional Information    Negative: [1] Caller is not with the adult (patient) AND [2] reporting urgent symptoms    Negative: Lab result questions    Negative: Medication questions    Negative: Caller can't be reached by phone    Negative: Caller has already spoken to PCP or another triager    Negative: RN needs further essential information from caller in order to complete triage    Negative: Requesting regular office appointment    Negative: [1] Caller requesting NON-URGENT health information AND [2] PCP's office is the best resource    Negative: Health Information question, no triage required and triager able to answer question    Protocols used: INFORMATION ONLY CALL - NO TRIAGE-A-

## 2022-11-15 ENCOUNTER — OFFICE VISIT (OUTPATIENT)
Dept: INTERNAL MEDICINE | Facility: CLINIC | Age: 72
End: 2022-11-15
Payer: COMMERCIAL

## 2022-11-15 ENCOUNTER — LAB (OUTPATIENT)
Dept: LAB | Facility: CLINIC | Age: 72
End: 2022-11-15
Payer: COMMERCIAL

## 2022-11-15 VITALS
DIASTOLIC BLOOD PRESSURE: 73 MMHG | BODY MASS INDEX: 26.74 KG/M2 | HEIGHT: 67 IN | SYSTOLIC BLOOD PRESSURE: 131 MMHG | HEART RATE: 84 BPM | OXYGEN SATURATION: 95 % | WEIGHT: 170.4 LBS

## 2022-11-15 DIAGNOSIS — R05.3 CHRONIC COUGH: ICD-10-CM

## 2022-11-15 DIAGNOSIS — I25.10 CORONARY ARTERY DISEASE INVOLVING NATIVE CORONARY ARTERY OF NATIVE HEART WITHOUT ANGINA PECTORIS: ICD-10-CM

## 2022-11-15 DIAGNOSIS — R05.3 CHRONIC COUGH: Primary | ICD-10-CM

## 2022-11-15 DIAGNOSIS — K21.00 GASTROESOPHAGEAL REFLUX DISEASE WITH ESOPHAGITIS WITHOUT HEMORRHAGE: ICD-10-CM

## 2022-11-15 LAB
ALBUMIN SERPL BCG-MCNC: 4.3 G/DL (ref 3.5–5.2)
ALP SERPL-CCNC: 73 U/L (ref 40–129)
ALT SERPL W P-5'-P-CCNC: 23 U/L (ref 10–50)
AST SERPL W P-5'-P-CCNC: 21 U/L (ref 10–50)
BILIRUB DIRECT SERPL-MCNC: <0.2 MG/DL (ref 0–0.3)
BILIRUB SERPL-MCNC: 0.4 MG/DL
PROT SERPL-MCNC: 7.7 G/DL (ref 6.4–8.3)

## 2022-11-15 PROCEDURE — 99214 OFFICE O/P EST MOD 30 MIN: CPT | Mod: 25 | Performed by: INTERNAL MEDICINE

## 2022-11-15 PROCEDURE — G0008 ADMIN INFLUENZA VIRUS VAC: HCPCS | Performed by: INTERNAL MEDICINE

## 2022-11-15 PROCEDURE — 90662 IIV NO PRSV INCREASED AG IM: CPT | Performed by: INTERNAL MEDICINE

## 2022-11-15 PROCEDURE — 36415 COLL VENOUS BLD VENIPUNCTURE: CPT | Performed by: PATHOLOGY

## 2022-11-15 PROCEDURE — 80076 HEPATIC FUNCTION PANEL: CPT | Performed by: PATHOLOGY

## 2022-11-15 NOTE — PROGRESS NOTES
"HPI  72-year-old presents today with his wife about 2-1/2 weeks after testing positive for COVID.  He was treated with pack Slo-Bid with significant almost immediate improvement by the report.  He had a lingering cough up until 2 days ago and the cough now appears to be subsiding.  He is monitoring his oxygen saturations at home and these are running in the mid 90s.  He has not had any exercise intolerance recently and has been trying to get his steps and daily.  We did review his CT lung cancer screening showing evidence of emphysema and his pulmonary function studies back in 2017 showing evidence of moderate obstructive lung disease.  We discussed repeating this and potentially using inhaled bronchodilators for future cough or symptoms.  Otherwise he has been doing well on the omeprazole not having any heartburn or acid reflux symptoms  Past Medical History:   Diagnosis Date     Gastric ulcer      Heart attack (H)      Hyperlipidemia      Smoker      Past Surgical History:   Procedure Laterality Date     ESOPHAGOSCOPY, GASTROSCOPY, DUODENOSCOPY (EGD), COMBINED  6/30/2014    Procedure: COMBINED ESOPHAGOSCOPY, GASTROSCOPY, DUODENOSCOPY (EGD);  Surgeon: Stone Wan MD;  Location:  GI     NO HISTORY OF SURGERY       Family History   Problem Relation Age of Onset     Hypertension Father         sibling     Heart Disease Father      Glaucoma No family hx of      Macular Degeneration No family hx of          Exam:  /73 (BP Location: Left arm, Patient Position: Sitting, Cuff Size: Adult Regular)   Pulse 84   Ht 1.7 m (5' 6.93\")   Wt 77.3 kg (170 lb 6.4 oz)   SpO2 95%   BMI 26.75 kg/m    170 lbs 6.4 oz  The patient is alert, oriented with a clear sensorium.   Skin shows no lesions or rashes and good turgor.   Head is normocephalic and atraumatic.    Neck shows no nodes, thyromegaly.     Lungs are clear.   Heart shows normal S1 and S2 without murmur or gallop.    Extremities show no " edema.        ASSESSMENT  1 Post-COVID cough improved ? Obstructive lung disease  2 hyperlipidemia on atorvastatin 20 mg  3 hypertension controlled   4 former smoker needs lung cancer screening  5 GERD improved on omeprazole  6 CAD with STEMI 2019 with single-vessel disease S/P PCI    Plan  We will update his immunizations with a flu shot today.  Will check his PFTs and at their request check his liver function as well.  Plan to have him follow-up in 6 months or sooner if any symptoms or problems    This note was completed using Dragon voice recognition software.      Paulino Harper MD  General Internal Medicine  [facility]  [phone]

## 2022-11-15 NOTE — NURSING NOTE
"Silke Moralez is a 72 year old male patient that presents today in clinic for the following:    Chief Complaint   Patient presents with     Cough     Pt here to discuss ongoing lingering cough from COVID that has lasted October 29th. Pt has had excessive fatigue. Pt took Paxlovid for 5 days and symptoms improved.      The patient's allergies and medications were reviewed as noted. A set of vitals were recorded as noted without incident: /73 (BP Location: Left arm, Patient Position: Sitting, Cuff Size: Adult Regular)   Pulse 84   Ht 1.7 m (5' 6.93\")   Wt 77.3 kg (170 lb 6.4 oz)   SpO2 95%   BMI 26.75 kg/m  . The patient does not have any other questions for the provider.    Pauline Sweet, EMT at 12:38 PM on 11/15/2022  "

## 2022-11-15 NOTE — PROGRESS NOTES
At the request of Dr. Paulino Harper, Silke Moralez received the Influenza Vaccine Fluzone High-Dose Quadrivalent [id number] Formula for 65 yrs of age and older vaccine today in clinic. The flu shot was given under the supervision of Dr. Paulino Harper if assistance was needed. The immunization site was cleaned with an alcohol prep wipe. The flu shot was given without incident--see immunization list for administration details. No swelling or redness was observed at the site of injection after the immunization was given. The patient was advised to remain in fourth floor lobby [facility] for fifteen minutes after the injection in case of an adverse reaction.     Leroy Scott, EMT  1:04 PM 11/15/2022

## 2022-11-15 NOTE — PATIENT INSTRUCTIONS
PFT Pulmonary Function Testing [phone] (3rd Floor [facility])

## 2022-11-17 ENCOUNTER — OFFICE VISIT (OUTPATIENT)
Dept: CARDIOLOGY | Facility: CLINIC | Age: 72
End: 2022-11-17
Attending: INTERNAL MEDICINE
Payer: COMMERCIAL

## 2022-11-17 VITALS
WEIGHT: 169.6 LBS | OXYGEN SATURATION: 96 % | HEIGHT: 67 IN | HEART RATE: 79 BPM | DIASTOLIC BLOOD PRESSURE: 76 MMHG | BODY MASS INDEX: 26.62 KG/M2 | SYSTOLIC BLOOD PRESSURE: 119 MMHG

## 2022-11-17 DIAGNOSIS — I25.10 CORONARY ARTERY DISEASE INVOLVING NATIVE CORONARY ARTERY OF NATIVE HEART WITHOUT ANGINA PECTORIS: ICD-10-CM

## 2022-11-17 DIAGNOSIS — K06.8 BLEEDING GUMS: Primary | ICD-10-CM

## 2022-11-17 PROCEDURE — 99214 OFFICE O/P EST MOD 30 MIN: CPT | Performed by: INTERNAL MEDICINE

## 2022-11-17 PROCEDURE — G0463 HOSPITAL OUTPT CLINIC VISIT: HCPCS

## 2022-11-17 ASSESSMENT — PAIN SCALES - GENERAL: PAINLEVEL: NO PAIN (0)

## 2022-11-17 NOTE — PROGRESS NOTES
CARDIOLOGY CLINIC FOLLOW UP    HPI: Silke Moralez is a 72 year old male, being seen today for recheck of CAD.     His cardiac history is notable for an NSTEMI while in [place] for which he underwent a POBA of the diagonal branch. He has since then been doing quite well and denies any chest pain, shortness of breath, orthopnea, PND, nausea, vomiting, lightheadedness, dizziness, or syncope.    Since the last the visit we had him do a cardiac MRI which showed an LAD infarct with preserved EF. He returns now for a follow up visit.     He has been intermittently compliant with his medications, has recently stopped aspirin when in [place] was told by a physician that it is the cause of his gingival bleeding. He otherwise is doing well and is compliant with his lipid and BP meds which are both well controlled. He recently returned from [place] and has had COVID which was treated with Paxlovid.    He is otherwise chest pain free, no dyspnea, orthopnea, PND, palpitations, lightheadedness or syncope.    PAST MEDICAL HISTORY:  Past Medical History:   Diagnosis Date     Gastric ulcer      Heart attack (H)      Hyperlipidemia      Smoker        CURRENT MEDICATIONS:  Current Outpatient Medications   Medication Sig Dispense Refill     acetaminophen (TYLENOL) 325 MG tablet Take 325-650 mg by mouth every 6 hours as needed for mild pain       amLODIPine (NORVASC) 5 MG tablet Take 1 tablet (5 mg) by mouth At Bedtime 100 tablet 3     aspirin (ASA) 81 MG EC tablet Take 1 tablet (81 mg) by mouth daily 90 tablet 3     atorvastatin (LIPITOR) 20 MG tablet Take 1 tablet (20 mg) by mouth daily 90 tablet 3     calcium carbonate (TUMS) 500 MG chewable tablet Take 2 chew tab by mouth 2 times daily as needed for heartburn       ibuprofen (ADVIL/MOTRIN) 400 MG tablet Take 400 mg by mouth every 6 hours as needed for moderate pain       metoprolol succinate ER (TOPROL XL) 25 MG 24 hr tablet Take 0.5 tablets (12.5 mg) by mouth daily 90 tablet 3      omeprazole (PRILOSEC) 20 MG DR capsule Take 1 capsule (20 mg) by mouth daily as needed (acid reflux) 100 capsule 3     UNABLE TO FIND 2.5 mg daily MEDICATION NAME: Levamlodipine besylate (Patient not taking: Reported on 2022)         PAST SURGICAL HISTORY:  Past Surgical History:   Procedure Laterality Date     ESOPHAGOSCOPY, GASTROSCOPY, DUODENOSCOPY (EGD), COMBINED  2014    Procedure: COMBINED ESOPHAGOSCOPY, GASTROSCOPY, DUODENOSCOPY (EGD);  Surgeon: Stone Wan MD;  Location:  GI     NO HISTORY OF SURGERY         ALLERGIES  Patient has no known allergies.    FAMILY HX:  Family History   Problem Relation Age of Onset     Hypertension Father         sibling     Heart Disease Father      Glaucoma No family hx of      Macular Degeneration No family hx of        SOCIAL HX:  Social History     Socioeconomic History     Marital status:      Spouse name: None     Number of children: None     Years of education: None     Highest education level: None   Occupational History     None   Social Needs     Financial resource strain: None     Food insecurity:     Worry: None     Inability: None     Transportation needs:     Medical: None     Non-medical: None   Tobacco Use     Smoking status: Former Smoker     Packs/day: 1.00     Types: Cigarettes     Last attempt to quit: 2017     Years since quittin.0     Smokeless tobacco: Never Used     Tobacco comment: 40 - 80 pack year history.  Smoked up to two packs per day. Trying to cut back   Substance and Sexual Activity     Alcohol use: No     Drug use: No     Sexual activity: None   Lifestyle     Physical activity:     Days per week: None     Minutes per session: None     Stress: None   Relationships     Social connections:     Talks on phone: None     Gets together: None     Attends Alevism service: None     Active member of club or organization: None     Attends meetings of clubs or organizations: None     Relationship status: None     Intimate  "partner violence:     Fear of current or ex partner: None     Emotionally abused: None     Physically abused: None     Forced sexual activity: None   Other Topics Concern     Parent/sibling w/ CABG, MI or angioplasty before 65F 55M? Not Asked   Social History Narrative     None       ROS:  Constitutional: No fever, chills, or sweats. No weight gain/loss.   ENT: No visual disturbance, ear ache, epistaxis, sore throat.   Allergies/Immunologic: Negative.   Respiratory: No cough, hemoptysis.   Cardiovascular: As per HPI.   GI: No nausea, vomiting, hematemesis, melena, or hematochezia.   : No urinary frequency, dysuria, or hematuria.   Integument: Negative.   Psychiatric: Negative.   Neuro: Negative.   Endocrinology: Negative.   Musculoskeletal: No myalgia.    VITAL SIGNS:  /76 (BP Location: Right arm, Patient Position: Chair, Cuff Size: Adult Regular)   Pulse 79   Ht 1.71 m (5' 7.32\")   Wt 76.9 kg (169 lb 9.6 oz)   SpO2 96%   BMI 26.31 kg/m    Body mass index is 26.31 kg/m .  Wt Readings from Last 2 Encounters:   11/17/22 76.9 kg (169 lb 9.6 oz)   11/15/22 77.3 kg (170 lb 6.4 oz)       PHYSICAL EXAM  Silke Moralez is a 72 year old male in no acute distress.  HEENT: Unremarkable.  Neck: JVP normal.  Carotids +4/4 bilaterally without bruits.  Lungs: CTA.  Cor: RRR. Normal S1 and S2.  No murmur, rub, or gallop.  PMI in Lf 5th ICS.  Abd: Soft, nontender, nondistended.  NABS.  No pulsatile mass.  Extremities: No C/C/E.  Pulses +4/4 symmetric in upper and lower extremities.  Neuro: Grossly intact.    LABS    Lab Results   Component Value Date    WBC 5.2 06/19/2019     Lab Results   Component Value Date    RBC 4.30 06/19/2019     Lab Results   Component Value Date    HGB 14.2 06/19/2019     Lab Results   Component Value Date    HCT 42.5 06/19/2019     No components found for: MCT  Lab Results   Component Value Date    MCV 99 06/19/2019     Lab Results   Component Value Date    MCH 33.0 06/19/2019     Lab Results "   Component Value Date    MCHC 33.4 2019     Lab Results   Component Value Date    RDW 11.8 2019     Lab Results   Component Value Date     2019      Recent Labs   Lab Test 19  0731 18  1628    142   POTASSIUM 4.3 4.0   CHLORIDE 110* 111*   CO2 26 25   ANIONGAP 5 6   GLC 85 86   BUN 20 13   CR 0.87 0.89   CECILIO 9.0 8.8     Recent Labs   Lab Test 19  0731 18  1628  12  1425   CHOL 130 205*   < > 187   HDL 56 38*   < > 42   LDL 61 144*   < > 108   TRIG 64 115   < > 185*   CHOLHDLRATIO  --   --   --  4.4   NHDL 74 167*   < >  --     < > = values in this interval not displayed.        EK  Sinus rhythm    ECHO: 19  Interpretation Summary  Left ventricular size is normal.  The Ejection Fraction is estimated at 45-50%.  Right ventricular function, chamber size, wall motion, and thickness are  normal.  The inferior vena cava is normal.  No pericardial effusion is present.    STRESS TEST:    Interpretation Summary  No stress induced regional wall motion abnormalities.  Normal resting LV function with EF of approximately 60-65%; normal response   to exercise with increase to approximately 70-75%.  No ECG evidence of ischemia.  No subjective evidence of ischemia.  Normal functional capacity.  The patient exhibited a hypertensive response with stress.  PatientHeight: 67 in  PatientWeight: 170 lbs  BSA 1.9 m^2    CARDIAC CATH:  May 2019      ASSESSMENT AND PLAN:    1. CAD, NSTEMI in [place], no stents placed  -- resume aspirin, continue atorvastatin and BB    2. Cardiomyopathy - normalized at 52%  -- continue BB as above, unable to take ACEI or ARB due to side effects    3. Hypertension, well controlled  -- BB and CCB    4. Hyperlipidemia, well controlled  -- continue statin    RTC 1 year

## 2022-11-17 NOTE — NURSING NOTE
Chief Complaint   Patient presents with     Follow Up     Return Cardiology- annual visit to manage coronary artery disease involving native vessel     Vitals were taken and medications reconciled.    YUVAL Wright  12:42 PM

## 2022-11-17 NOTE — LETTER
11/17/2022      RE: Silke Moralez  [address]  [facility] [address]       Dear Colleague,    Thank you for the opportunity to participate in the care of your patient, Silke Moralez, at the [facility]. Please see a copy of my visit note below.    CARDIOLOGY CLINIC FOLLOW UP    HPI: Silke Moralez is a 72 year old male, being seen today for recheck of CAD.     His cardiac history is notable for an NSTEMI while in [place] for which he underwent a POBA of the diagonal branch. He has since then been doing quite well and denies any chest pain, shortness of breath, orthopnea, PND, nausea, vomiting, lightheadedness, dizziness, or syncope.    Since the last the visit we had him do a cardiac MRI which showed an LAD infarct with preserved EF. He returns now for a follow up visit.     He has been intermittently compliant with his medications, has recently stopped aspirin when in [place] was told by a physician that it is the cause of his gingival bleeding. He otherwise is doing well and is compliant with his lipid and BP meds which are both well controlled. He recently returned from [place] and has had COVID which was treated with Paxlovid.    He is otherwise chest pain free, no dyspnea, orthopnea, PND, palpitations, lightheadedness or syncope.    PAST MEDICAL HISTORY:  Past Medical History:   Diagnosis Date     Gastric ulcer      Heart attack (H)      Hyperlipidemia      Smoker        CURRENT MEDICATIONS:  Current Outpatient Medications   Medication Sig Dispense Refill     acetaminophen (TYLENOL) 325 MG tablet Take 325-650 mg by mouth every 6 hours as needed for mild pain       amLODIPine (NORVASC) 5 MG tablet Take 1 tablet (5 mg) by mouth At Bedtime 100 tablet 3     aspirin (ASA) 81 MG EC tablet Take 1 tablet (81 mg) by mouth daily 90 tablet 3     atorvastatin (LIPITOR) 20 MG tablet Take 1 tablet (20 mg) by mouth daily 90 tablet 3      calcium carbonate (TUMS) 500 MG chewable tablet Take 2 chew tab by mouth 2 times daily as needed for heartburn       ibuprofen (ADVIL/MOTRIN) 400 MG tablet Take 400 mg by mouth every 6 hours as needed for moderate pain       metoprolol succinate ER (TOPROL XL) 25 MG 24 hr tablet Take 0.5 tablets (12.5 mg) by mouth daily 90 tablet 3     omeprazole (PRILOSEC) 20 MG DR capsule Take 1 capsule (20 mg) by mouth daily as needed (acid reflux) 100 capsule 3     UNABLE TO FIND 2.5 mg daily MEDICATION NAME: Levamlodipine besylate (Patient not taking: Reported on 2022)         PAST SURGICAL HISTORY:  Past Surgical History:   Procedure Laterality Date     ESOPHAGOSCOPY, GASTROSCOPY, DUODENOSCOPY (EGD), COMBINED  2014    Procedure: COMBINED ESOPHAGOSCOPY, GASTROSCOPY, DUODENOSCOPY (EGD);  Surgeon: Stone Wan MD;  Location:  GI     NO HISTORY OF SURGERY         ALLERGIES  Patient has no known allergies.    FAMILY HX:  Family History   Problem Relation Age of Onset     Hypertension Father         sibling     Heart Disease Father      Glaucoma No family hx of      Macular Degeneration No family hx of        SOCIAL HX:  Social History     Socioeconomic History     Marital status:      Spouse name: None     Number of children: None     Years of education: None     Highest education level: None   Occupational History     None   Social Needs     Financial resource strain: None     Food insecurity:     Worry: None     Inability: None     Transportation needs:     Medical: None     Non-medical: None   Tobacco Use     Smoking status: Former Smoker     Packs/day: 1.00     Types: Cigarettes     Last attempt to quit: 2017     Years since quittin.0     Smokeless tobacco: Never Used     Tobacco comment: 40 - 80 pack year history.  Smoked up to two packs per day. Trying to cut back   Substance and Sexual Activity     Alcohol use: No     Drug use: No     Sexual activity: None   Lifestyle     Physical activity:      "Days per week: None     Minutes per session: None     Stress: None   Relationships     Social connections:     Talks on phone: None     Gets together: None     Attends Congregational service: None     Active member of club or organization: None     Attends meetings of clubs or organizations: None     Relationship status: None     Intimate partner violence:     Fear of current or ex partner: None     Emotionally abused: None     Physically abused: None     Forced sexual activity: None   Other Topics Concern     Parent/sibling w/ CABG, MI or angioplasty before 65F 55M? Not Asked   Social History Narrative     None       ROS:  Constitutional: No fever, chills, or sweats. No weight gain/loss.   ENT: No visual disturbance, ear ache, epistaxis, sore throat.   Allergies/Immunologic: Negative.   Respiratory: No cough, hemoptysis.   Cardiovascular: As per HPI.   GI: No nausea, vomiting, hematemesis, melena, or hematochezia.   : No urinary frequency, dysuria, or hematuria.   Integument: Negative.   Psychiatric: Negative.   Neuro: Negative.   Endocrinology: Negative.   Musculoskeletal: No myalgia.    VITAL SIGNS:  /76 (BP Location: Right arm, Patient Position: Chair, Cuff Size: Adult Regular)   Pulse 79   Ht 1.71 m (5' 7.32\")   Wt 76.9 kg (169 lb 9.6 oz)   SpO2 96%   BMI 26.31 kg/m    Body mass index is 26.31 kg/m .  Wt Readings from Last 2 Encounters:   11/17/22 76.9 kg (169 lb 9.6 oz)   11/15/22 77.3 kg (170 lb 6.4 oz)       PHYSICAL EXAM  Silke Moralez is a 72 year old male in no acute distress.  HEENT: Unremarkable.  Neck: JVP normal.  Carotids +4/4 bilaterally without bruits.  Lungs: CTA.  Cor: RRR. Normal S1 and S2.  No murmur, rub, or gallop.  PMI in Lf 5th ICS.  Abd: Soft, nontender, nondistended.  NABS.  No pulsatile mass.  Extremities: No C/C/E.  Pulses +4/4 symmetric in upper and lower extremities.  Neuro: Grossly intact.    LABS    Lab Results   Component Value Date    WBC 5.2 06/19/2019     Lab Results "   Component Value Date    RBC 4.30 2019     Lab Results   Component Value Date    HGB 14.2 2019     Lab Results   Component Value Date    HCT 42.5 2019     No components found for: MCT  Lab Results   Component Value Date    MCV 99 2019     Lab Results   Component Value Date    MCH 33.0 2019     Lab Results   Component Value Date    MCHC 33.4 2019     Lab Results   Component Value Date    RDW 11.8 2019     Lab Results   Component Value Date     2019      Recent Labs   Lab Test 19  0731 18  1628    142   POTASSIUM 4.3 4.0   CHLORIDE 110* 111*   CO2 26 25   ANIONGAP 5 6   GLC 85 86   BUN 20 13   CR 0.87 0.89   CECILIO 9.0 8.8     Recent Labs   Lab Test 19  0731 18  1628  12  1425   CHOL 130 205*   < > 187   HDL 56 38*   < > 42   LDL 61 144*   < > 108   TRIG 64 115   < > 185*   CHOLHDLRATIO  --   --   --  4.4   NHDL 74 167*   < >  --     < > = values in this interval not displayed.        EK  Sinus rhythm    ECHO: 19  Interpretation Summary  Left ventricular size is normal.  The Ejection Fraction is estimated at 45-50%.  Right ventricular function, chamber size, wall motion, and thickness are  normal.  The inferior vena cava is normal.  No pericardial effusion is present.    STRESS TEST:    Interpretation Summary  No stress induced regional wall motion abnormalities.  Normal resting LV function with EF of approximately 60-65%; normal response   to exercise with increase to approximately 70-75%.  No ECG evidence of ischemia.  No subjective evidence of ischemia.  Normal functional capacity.  The patient exhibited a hypertensive response with stress.  PatientHeight: 67 in  PatientWeight: 170 lbs  BSA 1.9 m^2    CARDIAC CATH:  May 2019      ASSESSMENT AND PLAN:    1. CAD, NSTEMI in [place], no stents placed  -- resume aspirin, continue atorvastatin and BB    2. Cardiomyopathy - normalized at 52%  -- continue BB as above,  unable to take ACEI or ARB due to side effects    3. Hypertension, well controlled  -- BB and CCB    4. Hyperlipidemia, well controlled  -- continue statin    RTC 1 year           Please do not hesitate to contact me if you have any questions/concerns.     Sincerely,     Kash Ferrari MD

## 2022-11-17 NOTE — PATIENT INSTRUCTIONS
Patient Instructions:  It was a pleasure to see you in the cardiology clinic today.      If you have any questions, call  Ele Gutierres RN, at [phone].   [facility].  To schedule an appointment or to leave a message for your Care Team Press #1  If you are a physician calling for another physician Press #2  For Billing Press #3  For Medical Records Press #4  We are encouraging the use of [employer] to communicate with your HealthCare Provider    Note the new medications: none  Stop the following medications: none    The results from today include: none  Please follow up with Dr. Ferrari in one year     Referral to dentistry for bleeding       [facility]   [address], [address]   [facility] [address]   Phone: [phone]        If you have an urgent need after hours (8:00 am to 4:30 pm) please call [phone] and ask for the cardiology fellow on call.

## 2023-01-10 NOTE — PROGRESS NOTES
[facility]   at the [facility]   Otolaryngology Clinic     Patient: Silke Moralez    MRN: [id number]    : 1950    Age/Gender: 72 year old male  Date of Service: 2023  Rendering Provider:   Emy Fletcher MD     Referring Provider   PCP: Paulino Harper  Referring Physician: Paulino Harper MD  [address] [place] [address]  Reason for Consultation   Dysphonia  History   HISTORY OF PRESENT ILLNESS: Mr. Moralez is a 72 year old male who presents to us today with hoarseness.      Of note history of smoking 1 pack per day, quit in .    he presents today for evaluation. he reports:    Dysphonia  - quit smoking in May 2019 with mild heart attack  - was smoking 1 pack per day  - voice has remained hoarse since quitting smoking  - wants to rule out cancer  - today is a normal day for his voice    Dysphagia  - denies    Dyspnea  - denies    Throat clearing/cough  - denies    GERD/LPRD   - history on medication    History also obtained from Chloe (wife) today.     PAST MEDICAL HISTORY:   Past Medical History:   Diagnosis Date     Gastric ulcer      Heart attack (H)      Hyperlipidemia      Smoker        PAST SURGICAL HISTORY:   Past Surgical History:   Procedure Laterality Date     ESOPHAGOSCOPY, GASTROSCOPY, DUODENOSCOPY (EGD), COMBINED  2014    Procedure: COMBINED ESOPHAGOSCOPY, GASTROSCOPY, DUODENOSCOPY (EGD);  Surgeon: Stone Wan MD;  Location: [facility]     NO HISTORY OF SURGERY         CURRENT MEDICATIONS:   Current Outpatient Medications:      acetaminophen (TYLENOL) 325 MG tablet, Take 325-650 mg by mouth every 6 hours as needed for mild pain, Disp: , Rfl:      amLODIPine (NORVASC) 5 MG tablet, Take 1 tablet (5 mg) by mouth At Bedtime, Disp: 100 tablet, Rfl: 3     aspirin (ASA) 81 MG EC tablet, Take 1 tablet (81 mg) by mouth daily, Disp: 90 tablet, Rfl: 3     atorvastatin (LIPITOR) 20 MG tablet, Take 1 tablet (20 mg) by mouth daily, Disp: 90 tablet, Rfl:  3     calcium carbonate (TUMS) 500 MG chewable tablet, Take 2 chew tab by mouth 2 times daily as needed for heartburn, Disp: , Rfl:      cefdinir (OMNICEF) 300 MG capsule, Take 300 mg by mouth 2 times daily, Disp: , Rfl:      ciprofloxacin-hydrocortisone (CIPRO HC OTIC) 0.2-1 % otic suspension, 3 drops 2 times daily, Disp: , Rfl:      ibuprofen (ADVIL/MOTRIN) 400 MG tablet, Take 400 mg by mouth every 6 hours as needed for moderate pain, Disp: , Rfl:      metoprolol succinate ER (TOPROL XL) 25 MG 24 hr tablet, Take 0.5 tablets (12.5 mg) by mouth daily, Disp: 90 tablet, Rfl: 3     omeprazole (PRILOSEC) 20 MG DR capsule, Take 1 capsule (20 mg) by mouth daily as needed (acid reflux), Disp: 100 capsule, Rfl: 3     predniSONE (DELTASONE) 20 MG tablet, Take 20 mg by mouth daily, Disp: , Rfl:      UNABLE TO FIND, 2.5 mg daily MEDICATION NAME: Levamlodipine besylate (Patient not taking: Reported on 2022), Disp: , Rfl:   No current facility-administered medications for this visit.    Facility-Administered Medications Ordered in Other Visits:      sodium chloride (PF) 0.9% PF flush 10 mL, 10 mL, Intracatheter, Q8H, Yeison Zhu MD    ALLERGIES: Patient has no known allergies.    SOCIAL HISTORY:    Social History     Socioeconomic History     Marital status:      Spouse name: Not on file     Number of children: Not on file     Years of education: Not on file     Highest education level: Not on file   Occupational History     Not on file   Tobacco Use     Smoking status: Former     Packs/day: 1.00     Types: Cigarettes     Quit date: 2017     Years since quittin.3     Smokeless tobacco: Never     Tobacco comments:     40 - 80 pack year history.  Smoked up to two packs per day. Trying to cut back   Substance and Sexual Activity     Alcohol use: No     Drug use: No     Sexual activity: Not on file   Other Topics Concern     Parent/sibling w/ CABG, MI or angioplasty before 65F 55M? Not Asked   Social  History Narrative     Not on file     Social Determinants of Health     Financial Resource Strain: Not on file   Food Insecurity: Not on file   Transportation Needs: Not on file   Physical Activity: Not on file   Stress: Not on file   Social Connections: Not on file   Intimate Partner Violence: Not on file   Housing Stability: Not on file         FAMILY HISTORY:   Family History   Problem Relation Age of Onset     Hypertension Father         sibling     Heart Disease Father      Glaucoma No family hx of      Macular Degeneration No family hx of      Non-contributory for problems with anesthesia    REVIEW OF SYSTEMS:   The patient was asked a 14 point review of systems regarding constitutional symptoms, eye symptoms, ears, nose, mouth, throat symptoms, cardiovascular symptoms, respiratory symptoms, gastrointestinal symptoms, genitourinary symptoms, musculoskeletal symptoms, integumentary symptoms, neurological symptoms, psychiatric symptoms, endocrine symptoms, hematologic/lymphatic symptoms, and allergic/ immunologic symptoms.   The pertinent factors have been included in the HPI and below.  Patient Supplied Answers to Review of Systems  No flowsheet data found.    Physical Examination   The patient underwent a physical examination as described below. The pertinent positive and negative findings are summarized after the description of the examination.  Constitutional: The patient's developmental and nutritional status was assessed. The patient's voice quality was assessed.  Head and Face: The head and face were inspected for deformities. The sinuses were palpated. The salivary glands were palpated. Facial muscle strength was assessed bilaterally.  Eyes: Extraocular movements and primary gaze alignment were assessed.  Ears, Nose, Mouth and Throat: The ears and nose were examined for deformities. The nasal septum, mucosa, and turbinates were inspected by anterior rhinoscopy. The lips, teeth, and gums were examined for  abnormalities. The oral mucosa, tongue, palate, tonsils, lateral and posterior pharynx were inspected for the presence of asymmetry or mucosal lesions.    Neck: The tracheal position was noted, and the neck mass palpated to determine if there were any asymmetries, abnormal neck masses, thyromegally, or thyroid nodules.  Respiratory: The nature of the breathing and chest expansion/symmetry was observed.  Cardiovascular: The patient was examined to determine the presence of any edema or jugular venous distension.  Abdomen: The contour of the abdomen was noted.  Lymphatic: The patient was examined for infraclavicular lymphadenopathy.  Musculoskeletal: The patient was inspected for the presence of skeletal deformities.  Extremities: The extremities were examined for any clubbing or cyanosis.  Skin: The skin was examined for inflammatory or neoplastic conditions.  Neurologic: The patient's orientation, mood, and affect were noted. The cranial nerve  functions were examined.  Other pertinent positive and negative findings on physical examination:      OC/OP: no lesions, uvula midline, soft palate elevates symmetrically   Neck: no lesions, no TH tenderness to palpation     All other physical examination findings were within normal limits and noncontributory.  Procedures   Flexible laryngoscopy (CPT 67369)      Pre-procedure diagnosis: dysphonia  Post-procedure diagnosis: same as above  Indication for procedure: Mr. Moralez is a 72 year old male with see above  Procedure(s): Fiberoptic Laryngoscopy    Details of Procedure: After informed consent was obtained, the patient was seated in the examination chair.  The areas of the nasopharynx as well as the hypopharynx were anesthetized with topical 4% lidocaine with 0.25% phenylephrine atomizer.  Examination of the base of tongue was performed first.  Attention was directed to any evidence of masses in the area or evidence of leukoplakia or candidal infection.  Attention was  directed to the epiglottis where its size and position was determined and its movement on phonation of the vowel  e .  The piriform sinuses were then inspected for any mass lesions or pooling of secretions.  Attention was then directed to the larynx. The vocal folds were inspected for infection or any areas of leukoplakia, for masses, polypoid degeneration, or hemorrhage.  Having done this, the arytenoids and vocal processes were inspected for erythema or evidence of granuloma formation.  The posterior commissure was then inspected for evidence of inflammatory changes in the mucosa and heaping up of mucosal tissue. The patient was then instructed to say the vowel  e .  Adduction of vocal folds to the midline was observed for any evidence of paresis or paralysis of the larynx or asymmetry in rotation of the larynx to the left or right. The patient was asked to breathe and the degree of abduction was noted bilaterally.  Subglottic view of the larynx was obtained for any additional mass lesions or mucosal changes.  Finally the post cricoid was examined for evidence of pooling of secretions, as well as the pharyngeal wall mucosa.   Anesthesia type: 0.25% phenylephrine    Findings:  Anatomic/physiological deviations: LNC, presbylarynx, glottal gap, pooling of saliva   Right vocal process: No restriction of mobility   Left vocal process: No restriction of mobility  Glottal gap: Glottal gap  Supraglottic structures: Normal  Hypopharynx: Normal     Estimated Blood Loss: minimal  Complications: None  Disposition: Patient tolerated the procedure well          Review of Relevant Clinical Data   I personally reviewed:  Notes: Paulino Harper MD 11/15/2022   ASSESSMENT  1 Post-COVID cough improved ? Obstructive lung disease  2 hyperlipidemia on atorvastatin 20 mg  3 hypertension controlled   4 former smoker needs lung cancer screening  5 GERD improved on omeprazole  6 CAD with STEMI 2019 with single-vessel disease  S/P PCI  Radiology: The images were reviewed and interpreted of CT chest 10/5/2022  1. ACR Assessment Category (v1.1):  Lung-RADS Category 2. Benign appearance or behavior.         Pathology:    Procedures: PFT 2017  IMPRESSION:   Moderate Airflow Obstruction with air trapping and significant bronchodilator response.   Normal Diffusion Capacity, not corrected for hemoglobin.     EGD 2014  - Normal esophagus.   - Gastritis. This was biopsied.   - Normal gastric body.   - Duodenitis.       Labs:  Lab Results   Component Value Date    TSH 1.54 2019     Lab Results   Component Value Date     10/04/2022    CO2 28 10/04/2022    BUN 13.9 10/04/2022     Lab Results   Component Value Date    WBC 5.2 2021    HGB 13.8 2021    HCT 39.7 (L) 2021    MCV 95 2021     (L) 2021     No results found for: PT, PTT, INR  No results found for: MARIA LUISA  No components found for: RHEUMATOIDFACTOR,  RF  Lab Results   Component Value Date    CRP <5.0 2014     No components found for: CKTOT, URICACID  No components found for: C3, C4, DSDNAAB, NDNAABIFA  No results found for: MPOAB    Patient reported Quality of Life (QOL) Measures   Patient Supplied Answers To VHI Questionnaire  No flowsheet data found.      Patient Supplied Answers To EAT Questionnaire  No flowsheet data found.      Patient Supplied Answers To CSI Questionnaire  No flowsheet data found.      Patient Supplied Answers to Dyspnea Index Questionnaire:  No flowsheet data found.    Impression & Plan     IMPRESSION: Mr. Moralez is a 72 year old male who is being seen for the followin. 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 Moralez. If you have any questions, please do not hesitate to contact me.    Scribe Disclosure:  I, Guero Fulton, am serving as a scribe to document services personally performed by Emy Fletcher MD based on data collection and the provider's statements to me.     Emy Fletcher MD    Laryngology    [facility]  Department of  Otolaryngology - Head and Neck Surgery  Clinics & Surgery Center  [address] [place] [address]  Appointment line: [phone]  Fax: [phone]  [email or web address]

## 2023-01-12 ENCOUNTER — OFFICE VISIT (OUTPATIENT)
Dept: OTOLARYNGOLOGY | Facility: CLINIC | Age: 73
End: 2023-01-12
Attending: INTERNAL MEDICINE
Payer: COMMERCIAL

## 2023-01-12 ENCOUNTER — THERAPY VISIT (OUTPATIENT)
Dept: SPEECH THERAPY | Facility: CLINIC | Age: 73
End: 2023-01-12
Payer: COMMERCIAL

## 2023-01-12 VITALS
WEIGHT: 170.5 LBS | BODY MASS INDEX: 26.76 KG/M2 | DIASTOLIC BLOOD PRESSURE: 71 MMHG | SYSTOLIC BLOOD PRESSURE: 123 MMHG | HEART RATE: 75 BPM | OXYGEN SATURATION: 94 % | HEIGHT: 67 IN

## 2023-01-12 DIAGNOSIS — R49.0 DYSPHONIA: Primary | ICD-10-CM

## 2023-01-12 DIAGNOSIS — R49.0 DYSPHONIA: ICD-10-CM

## 2023-01-12 DIAGNOSIS — R05.3 CHRONIC COUGH: ICD-10-CM

## 2023-01-12 DIAGNOSIS — R49.0 HOARSENESS: ICD-10-CM

## 2023-01-12 DIAGNOSIS — R49.0 HOARSENESS: Primary | ICD-10-CM

## 2023-01-12 PROCEDURE — 31575 DIAGNOSTIC LARYNGOSCOPY: CPT | Performed by: OTOLARYNGOLOGY

## 2023-01-12 PROCEDURE — 94729 DIFFUSING CAPACITY: CPT | Performed by: INTERNAL MEDICINE

## 2023-01-12 PROCEDURE — 99203 OFFICE O/P NEW LOW 30 MIN: CPT | Mod: 25 | Performed by: OTOLARYNGOLOGY

## 2023-01-12 PROCEDURE — 94060 EVALUATION OF WHEEZING: CPT | Performed by: INTERNAL MEDICINE

## 2023-01-12 PROCEDURE — 92524 BEHAVRAL QUALIT ANALYS VOICE: CPT | Mod: GN | Performed by: SPEECH-LANGUAGE PATHOLOGIST

## 2023-01-12 PROCEDURE — 99207 PR NO BILLABLE SERVICE THIS VISIT: CPT | Performed by: SPEECH-LANGUAGE PATHOLOGIST

## 2023-01-12 RX ORDER — PREDNISONE 20 MG/1
20 TABLET ORAL DAILY
COMMUNITY
End: 2024-08-14

## 2023-01-12 RX ORDER — CEFDINIR 300 MG/1
300 CAPSULE ORAL 2 TIMES DAILY
COMMUNITY
End: 2023-09-12

## 2023-01-12 ASSESSMENT — PAIN SCALES - GENERAL: PAINLEVEL: NO PAIN (0)

## 2023-01-12 NOTE — PROGRESS NOTES
Silke Moralez was seen for allied health care provider visit in conjunction with Dr. Fletcher's ENT Multi-Disciplinary clinic. Introduced self and SLP roll in evaluation process. Currently pt reports hoarseness of voice which persisted after he quit smoking. Speaking is functional. Pt denies any trouble swallowing. He has a history of reflux and takes medications for this. All questions answered within scope of practice for pt and his wife. Encouraged pt to reach out with any questions or concerns. Time spent with patient 8. Deferred swallow evaluation as pt reports not troubles and has no pooling of secretions on endoscopic exam. No billable services provided during today's encounter.       Marah Berry MS, CCC-SLP  Speech-Language Pathology  [facility] [place]  Department of Otolaryngology/D&T - 4th floor  Phone: [phone]  Email: [email or web address]

## 2023-01-12 NOTE — PROGRESS NOTES
[facility]  [facility] - Dept. of Otolaryngology   Evaluation report - IN PERSON APPOINTMENT    Clinician: Michell Alicea M.M. (voice), M.A., CCC-SLP  Seen in conjunction with: Dr. Emy Fletcher  Referring physician:  Leroy  Patient: Silke Moralez  Date of Visit: 1/12/2023    HISTORY    Chief complaint: Silke Moralez is a 73 year old presenting today for evaluation of dysphonia.      Salient history: He has a history significant for cigarette smoking 1 ppd for 45 years, but quit in 2019 after experiencing a mild heart attack.    Presents today with his wife, Chloe.  We eventually had an  join us, although they insisted that his wife could help communicate today.    CURRENT SYMPTOMS INCLUDE  VOICE:     Hoarse while smoking, and remained hoarse after quitting smoking    Went to another ENT in [facility] next to [address] - dr. Sunshine  Saw inflammation and was given 3 prescriptions (2 antibiotics and prednisone).    Was better, but still an issue, then all on my chart.     When he was young, maybe 40 years ago, he had some kind of nodule on the vocal cord.  He would sometimes see a Chinese doctor who believed he had reflux, and so he has been on anti-reflux meds for a very long time.     THROAT ISSUES:     Denies throat concerns involving discomfort, cough, or throat clearing.    Early November had covid, and develop a cough    But was not the same cough as when he was an active smoker.     SWALLOWING:     Denies issues    RESPIRATION:     Denies issues    Antegen test was neg right away after COVID suppressant medication    Never had difficulty breathing as a part of his COVID symptoms    ADDITIONAL    Reflux: manages with medication -Currently taking omeprazole.    OTHER PERTINENT HISTORY    Complex medical history: please also refer to Dr. Fletcher's dictation.     Past Medical History:   Diagnosis Date     Gastric ulcer      Heart attack (H)      Hyperlipidemia      Smoker      Past Surgical  History:   Procedure Laterality Date     ESOPHAGOSCOPY, GASTROSCOPY, DUODENOSCOPY (EGD), COMBINED  6/30/2014    Procedure: COMBINED ESOPHAGOSCOPY, GASTROSCOPY, DUODENOSCOPY (EGD);  Surgeon: Stone Wan MD;  Location:  GI     NO HISTORY OF SURGERY         OBJECTIVE  PATIENT REPORTED MEASURES    Patient Supplied Answers To VHI Questionnaire  No flowsheet data found.    Patient Supplied Answers To CSI Questionnaire  No flowsheet data found.    Patient Supplied Answers To EAT Questionnaire  No flowsheet data found.      PERCEPTUAL EVALUATION (CPT 07830)  POSTURE / TENSION/ PALPATION OF THE LARYNGEAL AREA:     upper body    neck and shoulders    BREATHING:     appears within normal limits and adequate     clavicular elevation on inspiration    phonation is not coordinated with respiration    LARYNGEAL PALPATION:     firm musculature    tenderness of the thyrohyoid area    reduced thyrohyoid space    VOICE:    Moralez states that today is a typical voice today, with clinician observing voice quality to be characterized as:    Roughness: Moderate    Breathiness: Moderate    Strain: Moderate    Pitch:    F0/ Habitual Pitch: moderately elevated    246 Hz, 549-170 Hz    Max sustained phonation: 8 sec.  o Resonance:     Conversational speech:  laryngeal pharyngeal resonance    Loudness    Conversational speech:  Mildly reduced    Projected speech:  n/a    Singing vs. Speech:  n/a    GLOBAL ASSESSMENT OF DYSPHONIA: 35/100    COUGH/THROAT CLEARING:    N/a    LARYNGEAL FUNCTION STUDIES (CPT 80465)  n/a    LARYNGEAL EXAMINATION  Procedure: Flexible endoscopy with chip-tip technology with stroboscopy, left nostril; topical anesthesia with 3% Lidocaine and 0.25% phenylephrine was applied.   Performed by: Dr. Fletcher  The laryngeal and pharyngeal structures were evaluated for gross appearance, mobility, function, and focal lesions / abnormalities of the associated mucosa.    All findings were within normal limits with the exception of  the following salient features:     This exam shows the following:    Posterior commissure: Normal  Secretions: mild; no pooling of secretions    Movement:  Right Vocal Fold: Normal  Left Vocal Fold: Normal  Moderate supraglottic hyperfunction during connected speech tasks    Glottic Closure: weak  Upper Airway: Patent    Vocal Fold Findings:  Right Vocal Fold: bowed  Left Vocal Fold: bowed, with pinpoint blood vessel along the mid-point of the vibratory margin    THERAPY PROBES: Improvement was elicited with use of forward resonant stimuli, coordination of respiration and phonation, use of glottic coup to promote vocal fold closure and use of yawn sigh    The laryngeal exam was reviewed with Mr. Moralez, and I provided pertinent explanations, as well as written and oral information.       ASSESSMENT / PLAN  IMPRESSIONS: Silke Moralez is a 73 year old, presenting today with Laryngeal Hyperfunction (J38.7) and Dysphonia (R49.0) in the context of Vocal Fold Bowing (J38.3), as evidenced by evaluation the results of the evaluation laryngeal function studies, as well as the laryngeal exam.    Remarkable findings included:    Perceptual evaluation demonstrated:     Roughness: Moderate    Breathiness: Moderate    Strain: Moderate    Pitch:    F0/ Habitual Pitch: moderately elevated    246 Hz, 549-170 Hz      Laryngeal exam demonstrated: bilateral vocal fold bowing and moderate supraglottic hyperfunction.      Primary complaint of patient today included: hoarseness  Therefore, we recommended a course of speech therapy to address these concerns.    STIMULABILITY: results of therapy probes during perceptual and laryngeal evaluation demonstrate improvement with use of forward resonant stimuli, coordination of respiration and phonation, use of glottic coup to promote vocal fold closure and use of yawn sigh    RECOMMENDATIONS:     A course of speech therapy is recommended to optimize vocal technique, improve voice quality and promote  reduced discomfort, effort and fatigue.    He demonstrates a Good prognosis for improvement given adherence to therapeutic recommendations.     Positive indicators: positive response to therapy probes diagnosis is known to respond to treatment high level of comittment    Negative indicators: none    Research: This patient is not currently a candidate for research.  DURATION / FREQUENCY: a course of speech therapy was offered, but Silke and his wife felt that their priority was to ensure that he did not have cancer or some other concerning organic issue.   They will consider the option of speech therapy at this time.    EVALUATION ONLY    This treatment plan was developed with the patient who agreed with the recommendations.    TOTAL SERVICE TIME: 60 minutes  EVALUATION OF VOICE AND RESONANCE (73339)  NO CHARGE FACILITY FEE (19989)    Michell Alicea M.M. (voice), M.A., CCC/SLP  Speech-Language Pathologist  [facility] Trained Vocologist  [facility] Voice [facility]  [phone]  [email or web address]  Pronouns: she/her      *this report was created in part through the use of computerized dictation software, and though reviewed following completion, some typographic errors may persist.  If there is confusion regarding any of this notes contents, please contact me for clarification

## 2023-01-12 NOTE — LETTER
2023       RE: Silke Moralez  [address]  [facility] [address]     Dear Colleague,    Thank you for referring your patient, Silke Moralez, to the [facility] EAR NOSE AND THROAT [facility]IN[facility] [place] [facility]. Please see a copy of my visit note below.        [facility]   at the [facility]   Otolaryngology Clinic     Patient: Silke Moralez    MRN: [id number]    : 1950    Age/Gender: 72 year old male  Date of Service: 2023  Rendering Provider:   Emy Fletcher MD     Referring Provider   PCP: Paulino Harper  Referring Physician: Paulino Harper MD  [address] [place] [address]  Reason for Consultation   Dysphonia  History   HISTORY OF PRESENT ILLNESS: Mr. Moralez is a 72 year old male who presents to us today with hoarseness.      Of note history of smoking 1 pack per day, quit in .    he presents today for evaluation. he reports:    Dysphonia  - quit smoking in May 2019 with mild heart attack  - was smoking 1 pack per day  - voice has remained hoarse since quitting smoking  - wants to rule out cancer  - today is a normal day for his voice    Dysphagia  - denies    Dyspnea  - denies    Throat clearing/cough  - denies    GERD/LPRD   - history on medication    History also obtained from Chloe (wife) today.     PAST MEDICAL HISTORY:   Past Medical History:   Diagnosis Date     Gastric ulcer      Heart attack (H)      Hyperlipidemia      Smoker        PAST SURGICAL HISTORY:   Past Surgical History:   Procedure Laterality Date     ESOPHAGOSCOPY, GASTROSCOPY, DUODENOSCOPY (EGD), COMBINED  2014    Procedure: COMBINED ESOPHAGOSCOPY, GASTROSCOPY, DUODENOSCOPY (EGD);  Surgeon: Stone Wan MD;  Location:  GI     NO HISTORY OF SURGERY         CURRENT MEDICATIONS:   Current Outpatient Medications:      acetaminophen (TYLENOL) 325 MG tablet, Take 325-650 mg by mouth every 6 hours as needed for mild  pain, Disp: , Rfl:      amLODIPine (NORVASC) 5 MG tablet, Take 1 tablet (5 mg) by mouth At Bedtime, Disp: 100 tablet, Rfl: 3     aspirin (ASA) 81 MG EC tablet, Take 1 tablet (81 mg) by mouth daily, Disp: 90 tablet, Rfl: 3     atorvastatin (LIPITOR) 20 MG tablet, Take 1 tablet (20 mg) by mouth daily, Disp: 90 tablet, Rfl: 3     calcium carbonate (TUMS) 500 MG chewable tablet, Take 2 chew tab by mouth 2 times daily as needed for heartburn, Disp: , Rfl:      cefdinir (OMNICEF) 300 MG capsule, Take 300 mg by mouth 2 times daily, Disp: , Rfl:      ciprofloxacin-hydrocortisone (CIPRO HC OTIC) 0.2-1 % otic suspension, 3 drops 2 times daily, Disp: , Rfl:      ibuprofen (ADVIL/MOTRIN) 400 MG tablet, Take 400 mg by mouth every 6 hours as needed for moderate pain, Disp: , Rfl:      metoprolol succinate ER (TOPROL XL) 25 MG 24 hr tablet, Take 0.5 tablets (12.5 mg) by mouth daily, Disp: 90 tablet, Rfl: 3     omeprazole (PRILOSEC) 20 MG DR capsule, Take 1 capsule (20 mg) by mouth daily as needed (acid reflux), Disp: 100 capsule, Rfl: 3     predniSONE (DELTASONE) 20 MG tablet, Take 20 mg by mouth daily, Disp: , Rfl:      UNABLE TO FIND, 2.5 mg daily MEDICATION NAME: Levamlodipine besylate (Patient not taking: Reported on 2022), Disp: , Rfl:   No current facility-administered medications for this visit.    Facility-Administered Medications Ordered in Other Visits:      sodium chloride (PF) 0.9% PF flush 10 mL, 10 mL, Intracatheter, Q8H, Yeison Zhu MD    ALLERGIES: Patient has no known allergies.    SOCIAL HISTORY:    Social History     Socioeconomic History     Marital status:      Spouse name: Not on file     Number of children: Not on file     Years of education: Not on file     Highest education level: Not on file   Occupational History     Not on file   Tobacco Use     Smoking status: Former     Packs/day: 1.00     Types: Cigarettes     Quit date: 2017     Years since quittin.3     Smokeless tobacco:  Never     Tobacco comments:     40 - 80 pack year history.  Smoked up to two packs per day. Trying to cut back   Substance and Sexual Activity     Alcohol use: No     Drug use: No     Sexual activity: Not on file   Other Topics Concern     Parent/sibling w/ CABG, MI or angioplasty before 65F 55M? Not Asked   Social History Narrative     Not on file     Social Determinants of Health     Financial Resource Strain: Not on file   Food Insecurity: Not on file   Transportation Needs: Not on file   Physical Activity: Not on file   Stress: Not on file   Social Connections: Not on file   Intimate Partner Violence: Not on file   Housing Stability: Not on file         FAMILY HISTORY:   Family History   Problem Relation Age of Onset     Hypertension Father         sibling     Heart Disease Father      Glaucoma No family hx of      Macular Degeneration No family hx of      Non-contributory for problems with anesthesia    REVIEW OF SYSTEMS:   The patient was asked a 14 point review of systems regarding constitutional symptoms, eye symptoms, ears, nose, mouth, throat symptoms, cardiovascular symptoms, respiratory symptoms, gastrointestinal symptoms, genitourinary symptoms, musculoskeletal symptoms, integumentary symptoms, neurological symptoms, psychiatric symptoms, endocrine symptoms, hematologic/lymphatic symptoms, and allergic/ immunologic symptoms.   The pertinent factors have been included in the HPI and below.  Patient Supplied Answers to Review of Systems  No flowsheet data found.    Physical Examination   The patient underwent a physical examination as described below. The pertinent positive and negative findings are summarized after the description of the examination.  Constitutional: The patient's developmental and nutritional status was assessed. The patient's voice quality was assessed.  Head and Face: The head and face were inspected for deformities. The sinuses were palpated. The salivary glands were palpated. Facial  muscle strength was assessed bilaterally.  Eyes: Extraocular movements and primary gaze alignment were assessed.  Ears, Nose, Mouth and Throat: The ears and nose were examined for deformities. The nasal septum, mucosa, and turbinates were inspected by anterior rhinoscopy. The lips, teeth, and gums were examined for abnormalities. The oral mucosa, tongue, palate, tonsils, lateral and posterior pharynx were inspected for the presence of asymmetry or mucosal lesions.    Neck: The tracheal position was noted, and the neck mass palpated to determine if there were any asymmetries, abnormal neck masses, thyromegally, or thyroid nodules.  Respiratory: The nature of the breathing and chest expansion/symmetry was observed.  Cardiovascular: The patient was examined to determine the presence of any edema or jugular venous distension.  Abdomen: The contour of the abdomen was noted.  Lymphatic: The patient was examined for infraclavicular lymphadenopathy.  Musculoskeletal: The patient was inspected for the presence of skeletal deformities.  Extremities: The extremities were examined for any clubbing or cyanosis.  Skin: The skin was examined for inflammatory or neoplastic conditions.  Neurologic: The patient's orientation, mood, and affect were noted. The cranial nerve  functions were examined.  Other pertinent positive and negative findings on physical examination:      OC/OP: no lesions, uvula midline, soft palate elevates symmetrically   Neck: no lesions, no TH tenderness to palpation     All other physical examination findings were within normal limits and noncontributory.  Procedures   Flexible laryngoscopy (CPT 21957)      Pre-procedure diagnosis: dysphonia  Post-procedure diagnosis: same as above  Indication for procedure: Mr. Moralez is a 72 year old male with see above  Procedure(s): Fiberoptic Laryngoscopy    Details of Procedure: After informed consent was obtained, the patient was seated in the examination chair.  The  areas of the nasopharynx as well as the hypopharynx were anesthetized with topical 4% lidocaine with 0.25% phenylephrine atomizer.  Examination of the base of tongue was performed first.  Attention was directed to any evidence of masses in the area or evidence of leukoplakia or candidal infection.  Attention was directed to the epiglottis where its size and position was determined and its movement on phonation of the vowel  e .  The piriform sinuses were then inspected for any mass lesions or pooling of secretions.  Attention was then directed to the larynx. The vocal folds were inspected for infection or any areas of leukoplakia, for masses, polypoid degeneration, or hemorrhage.  Having done this, the arytenoids and vocal processes were inspected for erythema or evidence of granuloma formation.  The posterior commissure was then inspected for evidence of inflammatory changes in the mucosa and heaping up of mucosal tissue. The patient was then instructed to say the vowel  e .  Adduction of vocal folds to the midline was observed for any evidence of paresis or paralysis of the larynx or asymmetry in rotation of the larynx to the left or right. The patient was asked to breathe and the degree of abduction was noted bilaterally.  Subglottic view of the larynx was obtained for any additional mass lesions or mucosal changes.  Finally the post cricoid was examined for evidence of pooling of secretions, as well as the pharyngeal wall mucosa.   Anesthesia type: 0.25% phenylephrine    Findings:  Anatomic/physiological deviations: LNC, presbylarynx, glottal gap, pooling of saliva   Right vocal process: No restriction of mobility   Left vocal process: No restriction of mobility  Glottal gap: Glottal gap  Supraglottic structures: Normal  Hypopharynx: Normal     Estimated Blood Loss: minimal  Complications: None  Disposition: Patient tolerated the procedure well          Review of Relevant Clinical Data   I personally  reviewed:  Notes: Paulino Harper MD 11/15/2022   ASSESSMENT  1 Post-COVID cough improved ? Obstructive lung disease  2 hyperlipidemia on atorvastatin 20 mg  3 hypertension controlled   4 former smoker needs lung cancer screening  5 GERD improved on omeprazole  6 CAD with STEMI 2019 with single-vessel disease S/P PCI  Radiology: The images were reviewed and interpreted of CT chest 10/5/2022  1. ACR Assessment Category (v1.1):  Lung-RADS Category 2. Benign appearance or behavior.         Pathology:    Procedures: PFT 2017  IMPRESSION:   Moderate Airflow Obstruction with air trapping and significant bronchodilator response.   Normal Diffusion Capacity, not corrected for hemoglobin.     EGD 2014  - Normal esophagus.   - Gastritis. This was biopsied.   - Normal gastric body.   - Duodenitis.       Labs:  Lab Results   Component Value Date    TSH 1.54 2019     Lab Results   Component Value Date     10/04/2022    CO2 28 10/04/2022    BUN 13.9 10/04/2022     Lab Results   Component Value Date    WBC 5.2 2021    HGB 13.8 2021    HCT 39.7 (L) 2021    MCV 95 2021     (L) 2021     No results found for: PT, PTT, INR  No results found for: MARIA LUISA  No components found for: RHEUMATOIDFACTOR,  RF  Lab Results   Component Value Date    CRP <5.0 2014     No components found for: CKTOT, URICACID  No components found for: C3, C4, DSDNAAB, NDNAABIFA  No results found for: MPOAB    Patient reported Quality of Life (QOL) Measures   Patient Supplied Answers To VHI Questionnaire  No flowsheet data found.      Patient Supplied Answers To EAT Questionnaire  No flowsheet data found.      Patient Supplied Answers To CSI Questionnaire  No flowsheet data found.      Patient Supplied Answers to Dyspnea Index Questionnaire:  No flowsheet data found.    Impression & Plan     IMPRESSION: Mr. Moralez is a 72 year old male who is being seen for the followin. 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 oMralez. If you have any questions, please do not hesitate to contact me.    Scribe Disclosure:  I, Guero Fulton, am serving as a scribe to document services personally performed by Emy Fletcher MD based on data collection and the provider's statements to me.     Emy Fletcher MD    Laryngology    [facility]  Department of  Otolaryngology - Head and Neck Surgery  Clinics & Surgery Center  [address] [place] [id number]  Appointment line: [phone]  Fax: [phone]  [email or web address]

## 2023-01-12 NOTE — PATIENT INSTRUCTIONS
Michell Alicea M.M. (voice), M.A., CCC/SLP  Speech-Language Pathologist  [facility] Trained Vocologist  [facility]  [email or web address]   she/her  [email or web address]    Please call to schedule appointments.    Thank you! - Michell Alicea

## 2023-01-12 NOTE — LETTER
1/12/2023       RE: Silke Moralez  [address]  [facility] [address]     Dear Colleague,    Thank you for referring your patient, Silke Moralez, to the [facility]. Please see a copy of my visit note below.    [facility]  [facility] - Dept. of Otolaryngology   Evaluation report - IN PERSON APPOINTMENT    Clinician: Michell Alicea M.M. (voice), M.A., CCC-SLP  Seen in conjunction with: Dr. Emy Fletcher  Referring physician:  Leroy  Patient: Silke Moralez  Date of Visit: 1/12/2023    HISTORY    Chief complaint: Silke Moralez is a 73 year old presenting today for evaluation of dysphonia.      Salient history: He has a history significant for cigarette smoking 1 ppd for 45 years, but quit in 2019 after experiencing a mild heart attack.    Presents today with his wife, Chloe.  We eventually had an  join us, although they insisted that his wife could help communicate today.    CURRENT SYMPTOMS INCLUDE  VOICE:     Hoarse while smoking, and remained hoarse after quitting smoking    Went to another ENT in [facility] - dr. Sunshine  Saw inflammation and was given 3 prescriptions (2 antibiotics and prednisone).    Was better, but still an issue, then all on my chart.     When he was young, maybe 40 years ago, he had some kind of nodule on the vocal cord.  He would sometimes see a Chinese doctor who believed he had reflux, and so he has been on anti-reflux meds for a very long time.     THROAT ISSUES:     Denies throat concerns involving discomfort, cough, or throat clearing.    Early November had covid, and develop a cough    But was not the same cough as when he was an active smoker.     SWALLOWING:     Denies issues    RESPIRATION:     Denies issues    Antegen test was neg right away after COVID suppressant medication    Never had difficulty breathing as a part of his COVID  symptoms    ADDITIONAL    Reflux: manages with medication -Currently taking omeprazole.    OTHER PERTINENT HISTORY    Complex medical history: please also refer to Dr. Fletcher's dictation.     Past Medical History:   Diagnosis Date     Gastric ulcer      Heart attack (H)      Hyperlipidemia      Smoker      Past Surgical History:   Procedure Laterality Date     ESOPHAGOSCOPY, GASTROSCOPY, DUODENOSCOPY (EGD), COMBINED  6/30/2014    Procedure: COMBINED ESOPHAGOSCOPY, GASTROSCOPY, DUODENOSCOPY (EGD);  Surgeon: Stone Wan MD;  Location:  GI     NO HISTORY OF SURGERY         OBJECTIVE  PATIENT REPORTED MEASURES    Patient Supplied Answers To VHI Questionnaire  No flowsheet data found.    Patient Supplied Answers To CSI Questionnaire  No flowsheet data found.    Patient Supplied Answers To EAT Questionnaire  No flowsheet data found.      PERCEPTUAL EVALUATION (CPT 32595)  POSTURE / TENSION/ PALPATION OF THE LARYNGEAL AREA:     upper body    neck and shoulders    BREATHING:     appears within normal limits and adequate     clavicular elevation on inspiration    phonation is not coordinated with respiration    LARYNGEAL PALPATION:     firm musculature    tenderness of the thyrohyoid area    reduced thyrohyoid space    VOICE:    Moralez states that today is a typical voice today, with clinician observing voice quality to be characterized as:    Roughness: Moderate    Breathiness: Moderate    Strain: Moderate    Pitch:    F0/ Habitual Pitch: moderately elevated    246 Hz, 549-170 Hz    Max sustained phonation: 8 sec.  o Resonance:     Conversational speech:  laryngeal pharyngeal resonance    Loudness    Conversational speech:  Mildly reduced    Projected speech:  n/a    Singing vs. Speech:  n/a    GLOBAL ASSESSMENT OF DYSPHONIA: 35/100    COUGH/THROAT CLEARING:    N/a    LARYNGEAL FUNCTION STUDIES (CPT 98464)  n/a    LARYNGEAL EXAMINATION  Procedure: Flexible endoscopy with chip-tip technology with stroboscopy, left nostril;  topical anesthesia with 3% Lidocaine and 0.25% phenylephrine was applied.   Performed by: Dr. Fletcher  The laryngeal and pharyngeal structures were evaluated for gross appearance, mobility, function, and focal lesions / abnormalities of the associated mucosa.    All findings were within normal limits with the exception of the following salient features:     This exam shows the following:    Posterior commissure: Normal  Secretions: mild; no pooling of secretions    Movement:  Right Vocal Fold: Normal  Left Vocal Fold: Normal  Moderate supraglottic hyperfunction during connected speech tasks    Glottic Closure: weak  Upper Airway: Patent    Vocal Fold Findings:  Right Vocal Fold: bowed  Left Vocal Fold: bowed, with pinpoint blood vessel along the mid-point of the vibratory margin    THERAPY PROBES: Improvement was elicited with use of forward resonant stimuli, coordination of respiration and phonation, use of glottic coup to promote vocal fold closure and use of yawn sigh    The laryngeal exam was reviewed with Mr. Moralez, and I provided pertinent explanations, as well as written and oral information.       ASSESSMENT / PLAN  IMPRESSIONS: Silke Moralez is a 73 year old, presenting today with Laryngeal Hyperfunction (J38.7) and Dysphonia (R49.0) in the context of Vocal Fold Bowing (J38.3), as evidenced by evaluation the results of the evaluation laryngeal function studies, as well as the laryngeal exam.    Remarkable findings included:    Perceptual evaluation demonstrated:     Roughness: Moderate    Breathiness: Moderate    Strain: Moderate    Pitch:    F0/ Habitual Pitch: moderately elevated    246 Hz, 549-170 Hz      Laryngeal exam demonstrated: bilateral vocal fold bowing and moderate supraglottic hyperfunction.      Primary complaint of patient today included: hoarseness  Therefore, we recommended a course of speech therapy to address these concerns.    STIMULABILITY: results of therapy probes during perceptual and  laryngeal evaluation demonstrate improvement with use of forward resonant stimuli, coordination of respiration and phonation, use of glottic coup to promote vocal fold closure and use of yawn sigh    RECOMMENDATIONS:     A course of speech therapy is recommended to optimize vocal technique, improve voice quality and promote reduced discomfort, effort and fatigue.    He demonstrates a Good prognosis for improvement given adherence to therapeutic recommendations.     Positive indicators: positive response to therapy probes diagnosis is known to respond to treatment high level of comittment    Negative indicators: none    Research: This patient is not currently a candidate for research.  DURATION / FREQUENCY: a course of speech therapy was offered, but Silke and his wife felt that their priority was to ensure that he did not have cancer or some other concerning organic issue.   They will consider the option of speech therapy at this time.    EVALUATION ONLY    This treatment plan was developed with the patient who agreed with the recommendations.    TOTAL SERVICE TIME: 60 minutes  EVALUATION OF VOICE AND RESONANCE (50060)  NO CHARGE FACILITY FEE (02158)    Michell Alicea M.M. (voice) MNÉSTOR., CCC/SLP  Speech-Language Pathologist  [facility] Trained Vocologist  [facility]  [phone]  [email or web address]  Pronouns: she/her      *this report was created in part through the use of computerized dictation software, and though reviewed following completion, some typographic errors may persist.  If there is confusion regarding any of this notes contents, please contact me for clarification        Again, thank you for allowing me to participate in the care of your patient.      Sincerely,    Speech Language Pathologist

## 2023-01-13 LAB
DLCOUNC-%PRED-PRE: 75 %
DLCOUNC-PRE: 17.83 ML/MIN/MMHG
DLCOUNC-PRED: 23.53 ML/MIN/MMHG
ERV-%PRED-PRE: 78 %
ERV-PRE: 0.72 L
ERV-PRED: 0.92 L
EXPTIME-PRE: 16.14 SEC
FEF2575-%PRED-POST: 45 %
FEF2575-%PRED-PRE: 36 %
FEF2575-POST: 0.93 L/SEC
FEF2575-PRE: 0.76 L/SEC
FEF2575-PRED: 2.06 L/SEC
FEFMAX-%PRED-PRE: 83 %
FEFMAX-PRE: 6.23 L/SEC
FEFMAX-PRED: 7.5 L/SEC
FEV1-%PRED-PRE: 67 %
FEV1-PRE: 1.81 L
FEV1FEV6-PRE: 66 %
FEV1FEV6-PRED: 77 %
FEV1FVC-PRE: 61 %
FEV1FVC-PRED: 77 %
FEV1SVC-PRE: 61 %
FEV1SVC-PRED: 63 %
FIFMAX-PRE: 2.64 L/SEC
FVC-%PRED-PRE: 85 %
FVC-PRE: 2.97 L
FVC-PRED: 3.49 L
IC-%PRED-PRE: 67 %
IC-PRE: 2.25 L
IC-PRED: 3.34 L
VA-%PRED-PRE: 85 %
VA-PRE: 4.96 L
VC-%PRED-PRE: 69 %
VC-PRE: 2.98 L
VC-PRED: 4.25 L

## 2023-01-13 NOTE — PATIENT INSTRUCTIONS
1.  You were seen in the ENT Clinic today by . If you have any questions or concerns after your appointment, please call [phone]. Press option #1 for scheduling related needs. Press option #3 for Nurse advice.    2..  Plan is to return to clinic as needed      Leslie Hernandez LPN  [phone]  [facility] Otolaryngology

## 2023-01-17 DIAGNOSIS — J44.9 CHRONIC OBSTRUCTIVE PULMONARY DISEASE, UNSPECIFIED COPD TYPE (H): Primary | ICD-10-CM

## 2023-01-17 RX ORDER — FLUTICASONE PROPIONATE AND SALMETEROL 100; 50 UG/1; UG/1
1 POWDER RESPIRATORY (INHALATION) EVERY 12 HOURS
Qty: 1 EACH | Refills: 11 | Status: SHIPPED | OUTPATIENT
Start: 2023-01-17 | End: 2023-09-12

## 2023-01-26 ENCOUNTER — PRE VISIT (OUTPATIENT)
Dept: OTOLARYNGOLOGY | Facility: CLINIC | Age: 73
End: 2023-01-26

## 2023-08-28 DIAGNOSIS — I25.10 CORONARY ARTERY DISEASE INVOLVING NATIVE CORONARY ARTERY OF NATIVE HEART WITHOUT ANGINA PECTORIS: ICD-10-CM

## 2023-08-28 DIAGNOSIS — R12 HEARTBURN: Primary | ICD-10-CM

## 2023-08-31 RX ORDER — AMLODIPINE BESYLATE 5 MG/1
5 TABLET ORAL AT BEDTIME
Qty: 90 TABLET | Refills: 0 | Status: SHIPPED | OUTPATIENT
Start: 2023-08-31 | End: 2023-09-12

## 2023-08-31 RX ORDER — ATORVASTATIN CALCIUM 20 MG/1
20 TABLET, FILM COATED ORAL DAILY
Qty: 90 TABLET | Refills: 0 | Status: SHIPPED | OUTPATIENT
Start: 2023-08-31 | End: 2023-09-12

## 2023-08-31 NOTE — TELEPHONE ENCOUNTER
atorvastatin (LIPITOR) 20 MG tablet 90 tablet 3 9/28/2022     omeprazole (PRILOSEC) 20 MG DR capsule 100 capsule 3 9/28/2022     amLODIPine (NORVASC) 5 MG tablet 100 tablet 3 9/28/2022     Last Office Visit: 11/15/22  Future Office visit:   9/12/23    Refill protocol passed  Aurora Marcelo RN

## 2023-09-08 ENCOUNTER — LAB (OUTPATIENT)
Dept: LAB | Facility: CLINIC | Age: 73
End: 2023-09-08
Payer: COMMERCIAL

## 2023-09-08 ENCOUNTER — MYC MEDICAL ADVICE (OUTPATIENT)
Dept: INTERNAL MEDICINE | Facility: CLINIC | Age: 73
End: 2023-09-08

## 2023-09-08 ENCOUNTER — ANCILLARY PROCEDURE (OUTPATIENT)
Dept: CT IMAGING | Facility: CLINIC | Age: 73
End: 2023-09-08
Attending: INTERNAL MEDICINE
Payer: COMMERCIAL

## 2023-09-08 DIAGNOSIS — Z87.891 PERSONAL HISTORY OF NICOTINE DEPENDENCE: ICD-10-CM

## 2023-09-08 DIAGNOSIS — F17.200 SMOKER: ICD-10-CM

## 2023-09-08 DIAGNOSIS — I25.10 CORONARY ARTERY DISEASE INVOLVING NATIVE CORONARY ARTERY OF NATIVE HEART WITHOUT ANGINA PECTORIS: ICD-10-CM

## 2023-09-08 DIAGNOSIS — R73.02 IGT (IMPAIRED GLUCOSE TOLERANCE): ICD-10-CM

## 2023-09-08 DIAGNOSIS — R91.8 ABNORMAL CT SCAN OF LUNG: Primary | ICD-10-CM

## 2023-09-08 LAB
ANION GAP SERPL CALCULATED.3IONS-SCNC: 8 MMOL/L (ref 7–15)
BASOPHILS # BLD AUTO: 0 10E3/UL (ref 0–0.2)
BASOPHILS NFR BLD AUTO: 1 %
BUN SERPL-MCNC: 11.3 MG/DL (ref 8–23)
CALCIUM SERPL-MCNC: 9.4 MG/DL (ref 8.8–10.2)
CHLORIDE SERPL-SCNC: 108 MMOL/L (ref 98–107)
CHOLEST SERPL-MCNC: 135 MG/DL
CREAT SERPL-MCNC: 0.98 MG/DL (ref 0.67–1.17)
DEPRECATED HCO3 PLAS-SCNC: 27 MMOL/L (ref 22–29)
EGFRCR SERPLBLD CKD-EPI 2021: 81 ML/MIN/1.73M2
EOSINOPHIL # BLD AUTO: 0.1 10E3/UL (ref 0–0.7)
EOSINOPHIL NFR BLD AUTO: 3 %
ERYTHROCYTE [DISTWIDTH] IN BLOOD BY AUTOMATED COUNT: 11.4 % (ref 10–15)
GLUCOSE SERPL-MCNC: 107 MG/DL (ref 70–99)
HBA1C MFR BLD: 6 %
HCT VFR BLD AUTO: 40 % (ref 40–53)
HDLC SERPL-MCNC: 51 MG/DL
HGB BLD-MCNC: 13.9 G/DL (ref 13.3–17.7)
IMM GRANULOCYTES # BLD: 0 10E3/UL
IMM GRANULOCYTES NFR BLD: 0 %
LDLC SERPL CALC-MCNC: 63 MG/DL
LYMPHOCYTES # BLD AUTO: 2.1 10E3/UL (ref 0.8–5.3)
LYMPHOCYTES NFR BLD AUTO: 46 %
MCH RBC QN AUTO: 33.3 PG (ref 26.5–33)
MCHC RBC AUTO-ENTMCNC: 34.8 G/DL (ref 31.5–36.5)
MCV RBC AUTO: 96 FL (ref 78–100)
MONOCYTES # BLD AUTO: 0.5 10E3/UL (ref 0–1.3)
MONOCYTES NFR BLD AUTO: 11 %
NEUTROPHILS # BLD AUTO: 1.8 10E3/UL (ref 1.6–8.3)
NEUTROPHILS NFR BLD AUTO: 39 %
NONHDLC SERPL-MCNC: 84 MG/DL
NRBC # BLD AUTO: 0 10E3/UL
NRBC BLD AUTO-RTO: 0 /100
PLATELET # BLD AUTO: 130 10E3/UL (ref 150–450)
POTASSIUM SERPL-SCNC: 4.5 MMOL/L (ref 3.4–5.3)
RBC # BLD AUTO: 4.17 10E6/UL (ref 4.4–5.9)
SODIUM SERPL-SCNC: 143 MMOL/L (ref 136–145)
TRIGL SERPL-MCNC: 103 MG/DL
WBC # BLD AUTO: 4.5 10E3/UL (ref 4–11)

## 2023-09-08 PROCEDURE — 99000 SPECIMEN HANDLING OFFICE-LAB: CPT | Performed by: PATHOLOGY

## 2023-09-08 PROCEDURE — 85025 COMPLETE CBC W/AUTO DIFF WBC: CPT | Performed by: PATHOLOGY

## 2023-09-08 PROCEDURE — 36415 COLL VENOUS BLD VENIPUNCTURE: CPT | Performed by: PATHOLOGY

## 2023-09-08 PROCEDURE — 83036 HEMOGLOBIN GLYCOSYLATED A1C: CPT | Performed by: INTERNAL MEDICINE

## 2023-09-08 PROCEDURE — 80048 BASIC METABOLIC PNL TOTAL CA: CPT | Performed by: PATHOLOGY

## 2023-09-08 PROCEDURE — 71271 CT THORAX LUNG CANCER SCR C-: CPT | Mod: GC | Performed by: RADIOLOGY

## 2023-09-08 PROCEDURE — 80061 LIPID PANEL: CPT | Performed by: PATHOLOGY

## 2023-09-12 ENCOUNTER — OFFICE VISIT (OUTPATIENT)
Dept: INTERNAL MEDICINE | Facility: CLINIC | Age: 73
End: 2023-09-12
Payer: COMMERCIAL

## 2023-09-12 VITALS
BODY MASS INDEX: 27.37 KG/M2 | SYSTOLIC BLOOD PRESSURE: 130 MMHG | OXYGEN SATURATION: 97 % | WEIGHT: 170.3 LBS | DIASTOLIC BLOOD PRESSURE: 73 MMHG | HEART RATE: 79 BPM | HEIGHT: 66 IN

## 2023-09-12 DIAGNOSIS — Z12.11 SPECIAL SCREENING FOR MALIGNANT NEOPLASMS, COLON: Primary | ICD-10-CM

## 2023-09-12 DIAGNOSIS — I21.4 NSTEMI (NON-ST ELEVATED MYOCARDIAL INFARCTION) (H): ICD-10-CM

## 2023-09-12 DIAGNOSIS — I25.10 CORONARY ARTERY DISEASE INVOLVING NATIVE CORONARY ARTERY OF NATIVE HEART WITHOUT ANGINA PECTORIS: ICD-10-CM

## 2023-09-12 DIAGNOSIS — K21.00 GASTROESOPHAGEAL REFLUX DISEASE WITH ESOPHAGITIS WITHOUT HEMORRHAGE: ICD-10-CM

## 2023-09-12 DIAGNOSIS — J44.9 CHRONIC OBSTRUCTIVE PULMONARY DISEASE, UNSPECIFIED COPD TYPE (H): ICD-10-CM

## 2023-09-12 PROCEDURE — G0439 PPPS, SUBSEQ VISIT: HCPCS | Performed by: INTERNAL MEDICINE

## 2023-09-12 RX ORDER — FLUTICASONE PROPIONATE AND SALMETEROL 100; 50 UG/1; UG/1
1 POWDER RESPIRATORY (INHALATION) EVERY 12 HOURS
Qty: 3 EACH | Refills: 3 | Status: SHIPPED | OUTPATIENT
Start: 2023-09-12 | End: 2024-10-01

## 2023-09-12 RX ORDER — ASPIRIN 81 MG/1
81 TABLET ORAL EVERY OTHER DAY
Qty: 100 TABLET | Refills: 3 | Status: SHIPPED | OUTPATIENT
Start: 2023-09-12 | End: 2024-10-01

## 2023-09-12 RX ORDER — AMLODIPINE BESYLATE 5 MG/1
5 TABLET ORAL AT BEDTIME
Qty: 90 TABLET | Refills: 3 | Status: SHIPPED | OUTPATIENT
Start: 2023-09-12 | End: 2023-11-20

## 2023-09-12 RX ORDER — ATORVASTATIN CALCIUM 20 MG/1
20 TABLET, FILM COATED ORAL DAILY
Qty: 90 TABLET | Refills: 3 | Status: SHIPPED | OUTPATIENT
Start: 2023-09-12 | End: 2023-11-20

## 2023-09-12 RX ORDER — METOPROLOL SUCCINATE 25 MG/1
12.5 TABLET, EXTENDED RELEASE ORAL DAILY
Qty: 90 TABLET | Refills: 3 | Status: SHIPPED | OUTPATIENT
Start: 2023-09-12 | End: 2023-11-20

## 2023-09-12 NOTE — PROGRESS NOTES
HPI  73-year-old presents today with his wife for Medicare wellness visit.  We again discussed his recent CT scan and the recommendation regarding high-resolution scanning to clarify the presence of pulmonary fibrosis.  He is presently asymptomatic concerned about radiation exposure so we will hold off on this-year-old for 3 to 6 months.  He is physically active he is walking up to 40 minutes a day by his report.  Reviewed the recent labs showing evidence of impaired glucose tolerance and this has caused him concern and he has improved his diet cut down on the sugar and the carbs and will maintain his regular exercise regimen.  He is no longer smoking he is asymptomatic from a cardiac standpoint he stopped taking his aspirin because of some oral bleeding.  He did last have a deep dental cleaning 2 years ago has not had any dental evaluation or cleaning since that time  Past Medical History:   Diagnosis Date    Gastric ulcer     Heart attack (H)     Hyperlipidemia     Smoker      Past Surgical History:   Procedure Laterality Date    ESOPHAGOSCOPY, GASTROSCOPY, DUODENOSCOPY (EGD), COMBINED  6/30/2014    Procedure: COMBINED ESOPHAGOSCOPY, GASTROSCOPY, DUODENOSCOPY (EGD);  Surgeon: Stone Wan MD;  Location:  GI    NO HISTORY OF SURGERY       Family History   Problem Relation Age of Onset    Hypertension Father         sibling    Heart Disease Father     Glaucoma No family hx of     Macular Degeneration No family hx of      Answers submitted by the patient for this visit:  Symptoms you have experienced in the last 30 days (Submitted on 9/12/2023)  General Symptoms: No  Skin Symptoms: No  HENT Symptoms: No  EYE SYMPTOMS: No  HEART SYMPTOMS: No  LUNG SYMPTOMS: No  INTESTINAL SYMPTOMS: No  URINARY SYMPTOMS: No  REPRODUCTIVE SYMPTOMS: No  SKELETAL SYMPTOMS: No  BLOOD SYMPTOMS: No  NERVOUS SYSTEM SYMPTOMS: No  MENTAL HEALTH SYMPTOMS: No      Exam:  /73 (BP Location: Right arm, Patient Position: Sitting, Cuff Size:  "Adult Regular)   Pulse 79   Ht 1.672 m (5' 5.83\")   Wt 77.2 kg (170 lb 4.8 oz)   SpO2 97%   BMI 27.63 kg/m    170 lbs 4.8 oz  Physical Exam   The patient is alert, oriented with a clear sensorium.   Skin shows no lesions or rashes and good turgor.   Head is normocephalic and atraumatic.   Eyes show PERRLA   Ears show normal TMs bilaterally.   Mouth shows clear oral mucosa.  Gums show significant retraction some superficial irritation and ulceration no active bleeding  Neck shows no nodes, thyromegaly or bruits.   Back is non tender.  Lungs are clear to percussion and auscultation.   Heart shows normal S1 and S2 without murmur or gallop.   Abdomen is soft and nontender without masses or organomegaly.   Extremities show no edema and no evidence of active synovitis.   Neurologic examination shows cranial nerves II-XII intact. Motor shows 5/5 strength. Reflexes are symmetric.     ASSESSMENT  1 IGT  2 hyperlipidemia well controlled on atorvastatin 20 mg  3 hypertension controlled   4 former smoker  5 GERD on omeprazole  6 CAD with STEMI 2019 with single-vessel disease S/P PCI  7 ? Pulmonary fibrosis on CT needs high resolution scan  8 Gum disease needs dental hygienist    Plan  We discussed exercise and diet in relation to diabetes prevention.  Encouraged him to at least see the dental hygienist for a teeth cleaning in light of his gum disease.  In light of his previous STEMI he really should stay on low-dose aspirin and I encouraged him for this and refilled this along with his other medications.  Said longstanding chronic GERD and is due for gastroscopy along with a screening colonoscopy.  We will plan to have him follow-up in 6 months.       This note was completed using Dragon voice recognition software.      Paulino Harper MD  General Internal Medicine  [facility]  [phone]      "

## 2023-09-12 NOTE — NURSING NOTE
"Silke Moralez is a 73 year old male patient that presents today in clinic for the following:    Chief Complaint   Patient presents with    Physical     Pt here for annual physical and would like to discuss lab and imaging results and bleeding gums.    Refill Request     The patient's allergies and medications were reviewed as noted. A set of vitals were recorded as noted without incident: /73 (BP Location: Right arm, Patient Position: Sitting, Cuff Size: Adult Regular)   Pulse 79   Ht 1.672 m (5' 5.83\")   Wt 77.2 kg (170 lb 4.8 oz)   SpO2 97%   BMI 27.63 kg/m  . The patient does not have any other questions for the provider.    Pauline Sweet, EMT at 2:04 PM on 9/12/2023  "

## 2023-10-19 ENCOUNTER — TELEPHONE (OUTPATIENT)
Dept: GASTROENTEROLOGY | Facility: CLINIC | Age: 73
End: 2023-10-19
Payer: COMMERCIAL

## 2023-10-19 ENCOUNTER — HOSPITAL ENCOUNTER (OUTPATIENT)
Facility: AMBULATORY SURGERY CENTER | Age: 73
End: 2023-10-19
Attending: INTERNAL MEDICINE
Payer: COMMERCIAL

## 2023-10-19 NOTE — TELEPHONE ENCOUNTER
"Endoscopy Scheduling Screen    Have you had a positive Covid test in the last 14 days?  No    Are you active on MyChart?   Yes    What insurance is in the chart?  Other:  [employer]    Ordering/Referring Provider: ADIA GÓMEZ   (If ordering provider performs procedure, schedule with ordering provider unless otherwise instructed. )    BMI: Estimated body mass index is 27.63 kg/m  as calculated from the following:    Height as of 9/12/23: 1.672 m (5' 5.83\").    Weight as of 9/12/23: 77.2 kg (170 lb 4.8 oz).     Sedation Ordered  moderate sedation.   If patient BMI > 50 do not schedule in ASC.    If patient BMI > 45 do not schedule at [facility].    Are you taking methadone or Suboxone?  No    Are you taking any prescription medications for pain 3 or more times per week?   No    Do you have a history of malignant hyperthermia or adverse reaction to anesthesia?  No    (Females) Are you currently pregnant?   No     Have you been diagnosed or told you have pulmonary hypertension?   No    Do you have an LVAD?  No    Have you been told you have moderate to severe sleep apnea?  No    Have you been told you have COPD, asthma, or any other lung disease?  No- inhaler due to long term smoking    Do you have any heart conditions?  No     Have you ever had an organ transplant?   No    Have you ever had or are you awaiting a heart or lung transplant?   No    Have you had a stroke or transient ischemic attack (TIA aka \"mini stroke\" in the last 6 months?   No    Have you been diagnosed with or been told you have cirrhosis of the liver?   No    Are you currently on dialysis?   No    Do you need assistance transferring?   No    BMI: Estimated body mass index is 27.63 kg/m  as calculated from the following:    Height as of 9/12/23: 1.672 m (5' 5.83\").    Weight as of 9/12/23: 77.2 kg (170 lb 4.8 oz).     Is patients BMI > 40 and scheduling location [facility]?  No    Do you take an injectable medication for weight loss or diabetes (excluding " insulin)?  No    Do you take the medication Naltrexone?  No    Do you take blood thinners?  No       Prep   Are you currently on dialysis or do you have chronic kidney disease?  No    Do you have a diagnosis of diabetes?  No    Do you have a diagnosis of cystic fibrosis (CF)?  No    On a regular basis do you go 3 -5 days between bowel movements?  No    BMI > 40?  No    Preferred Pharmacy:    [place], [place] - [address] [facility] [phone]  [address]  [facility] [address]  Phone: [phone] Fax: [phone] Alternate Fax: [phone], [phone]    Final Scheduling Details   Colonoscopy prep sent?  Standard MiraLAX    Procedure scheduled  Colonoscopy    Surgeon:  FRANK     Date of procedure:  1/29/24     Pre-OP / PAC:   No - Not required for this site.    Location  [facility] - [facility] - Per order.    Sedation   Moderate Sedation - Per order.      Patient Reminders:   You will receive a call from a Nurse to review instructions and health history.  This assessment must be completed prior to your procedure.  Failure to complete the Nurse assessment may result in the procedure being cancelled.      On the day of your procedure, please designate an adult(s) who can drive you home stay with you for the next 24 hours. The medicines used in the exam will make you sleepy. You will not be able to drive.      You cannot take public transportation, ride share services, or non-medical taxi service without a responsible caregiver.  Medical transport services are allowed with the requirement that a responsible caregiver will receive you at your destination.  We require that drivers and caregivers are confirmed prior to your procedure.

## 2023-11-13 ENCOUNTER — TELEPHONE (OUTPATIENT)
Dept: GASTROENTEROLOGY | Facility: CLINIC | Age: 73
End: 2023-11-13
Payer: COMMERCIAL

## 2023-11-13 NOTE — TELEPHONE ENCOUNTER
Pre assessment completed for upcoming procedure.    Verbal consent given by patient to speak to wifeChloe. Advised to have consent to communicate form signed at appointment.     Procedure details:    Arrival time and facility location reviewed.    Pre op exam needed? N/A    Designated  policy reviewed. Instructed to have someone stay 6 hours post procedure.     COVID policy reviewed.      Medication review:    Medications reviewed. Please see supporting documentation below. Holding recommendations discussed (if applicable).       Prep for procedure:     Reviewed procedure prep instructions.     Patient verbalized understanding and had no questions or concerns at this time.        Barb Maldonado RN  Endoscopy Procedure Pre Assessment RN  [phone] option 4

## 2023-11-13 NOTE — TELEPHONE ENCOUNTER
*NOTE - preferred number listed in wifeChloe. No consent to communicate on file.     Attempted to contact patient in order to complete pre assessment questions.     Patient is not currently with wife. They will return call to [phone] option 4      Procedure details:    Patient scheduled for Colonoscopy/Upper endoscopy (EGD) on 11/22/23. Add on     Arrival time: 1000. Procedure time 1100    Pre op exam needed? N/A    Facility location: [facility]; [address] [place] [address], [address] Floor, [place] [address]    Sedation type: Conscious sedation     Indication for procedure: gerd, screening       Chart review:     Electronic implanted devices? No    Recent diagnosis of diverticulitis within the last 6 weeks? No    Diabetic? No      Medication review:    Anticoagulants? No    NSAIDS? No    Other medication HOLDING recommendations:  N/A      Prep for procedure:     Bowel prep recommendation: Standard Miralax  Due to: standard bowel prep.    Prep instructions sent via [employer].     Barb Maldonado RN  Endoscopy Procedure Pre Assessment RN

## 2023-11-13 NOTE — TELEPHONE ENCOUNTER
Caller: Chloe Posey (wife)  Reason for Reschedule/Cancellation (please be detailed, any staff messages or encounters to note?): the patient is traveling in early February, wants sooner date      Prior to reschedule please review:  Ordering Provider: ADIA GÓMEZ   Sedation per order: CS  Does patient have any ASC Exclusions, please identify?: NO      Notes on Cancelled Procedure:  Procedure: Upper and Lower Endoscopy [EGD and Colonoscopy]   Date: 1/29/24  Location: [facility] [place]; [address], [address], [place] [address]  Surgeon: FRANK      Rescheduled: Yes  Procedure: Upper and Lower Endoscopy [EGD and Colonoscopy]   Date: 11/22  Location: [facility]; [address], [address], [place] [address]  Surgeon: VIOLA  Sedation Level Scheduled  CS,  Reason for Sedation Level PER ORDER  Prep/Instructions updated and sent: Y       Send In - basket message to Panc - Yonatan Pool if EUS  procedure is canceled or rescheduled: [ N/A, YES or NO] N/A

## 2023-11-22 ENCOUNTER — HOSPITAL ENCOUNTER (OUTPATIENT)
Facility: CLINIC | Age: 73
Discharge: HOME | End: 2023-11-22
Attending: INTERNAL MEDICINE | Admitting: INTERNAL MEDICINE
Payer: COMMERCIAL

## 2023-11-22 VITALS
RESPIRATION RATE: 14 BRPM | HEART RATE: 70 BPM | DIASTOLIC BLOOD PRESSURE: 70 MMHG | OXYGEN SATURATION: 99 % | SYSTOLIC BLOOD PRESSURE: 129 MMHG

## 2023-11-22 DIAGNOSIS — B37.81 ESOPHAGEAL CANDIDIASIS (H): Primary | ICD-10-CM

## 2023-11-22 LAB
COLONOSCOPY: NORMAL
UPPER GI ENDOSCOPY: NORMAL

## 2023-11-22 PROCEDURE — 45385 COLONOSCOPY W/LESION REMOVAL: CPT | Mod: PT | Performed by: INTERNAL MEDICINE

## 2023-11-22 PROCEDURE — 88305 TISSUE EXAM BY PATHOLOGIST: CPT | Mod: 26 | Performed by: PATHOLOGY

## 2023-11-22 PROCEDURE — 250N000009 HC RX 250: Performed by: INTERNAL MEDICINE

## 2023-11-22 PROCEDURE — 99153 MOD SED SAME PHYS/QHP EA: CPT | Mod: PT | Performed by: INTERNAL MEDICINE

## 2023-11-22 PROCEDURE — 43239 EGD BIOPSY SINGLE/MULTIPLE: CPT | Performed by: INTERNAL MEDICINE

## 2023-11-22 PROCEDURE — G0500 MOD SEDAT ENDO SERVICE >5YRS: HCPCS | Mod: PT | Performed by: INTERNAL MEDICINE

## 2023-11-22 PROCEDURE — 250N000011 HC RX IP 250 OP 636: Performed by: INTERNAL MEDICINE

## 2023-11-22 PROCEDURE — 88312 SPECIAL STAINS GROUP 1: CPT | Mod: 26 | Performed by: PATHOLOGY

## 2023-11-22 PROCEDURE — 88305 TISSUE EXAM BY PATHOLOGIST: CPT | Mod: TC | Performed by: INTERNAL MEDICINE

## 2023-11-22 RX ORDER — FLUMAZENIL 0.1 MG/ML
0.2 INJECTION, SOLUTION INTRAVENOUS
Status: DISCONTINUED | OUTPATIENT
Start: 2023-11-22 | End: 2023-11-22 | Stop reason: HOSPADM

## 2023-11-22 RX ORDER — PROCHLORPERAZINE MALEATE 5 MG
5 TABLET ORAL EVERY 6 HOURS PRN
Status: DISCONTINUED | OUTPATIENT
Start: 2023-11-22 | End: 2023-11-22 | Stop reason: HOSPADM

## 2023-11-22 RX ORDER — ONDANSETRON 2 MG/ML
4 INJECTION INTRAMUSCULAR; INTRAVENOUS
Status: DISCONTINUED | OUTPATIENT
Start: 2023-11-22 | End: 2023-11-22 | Stop reason: HOSPADM

## 2023-11-22 RX ORDER — NALOXONE HYDROCHLORIDE 0.4 MG/ML
0.4 INJECTION, SOLUTION INTRAMUSCULAR; INTRAVENOUS; SUBCUTANEOUS
Status: DISCONTINUED | OUTPATIENT
Start: 2023-11-22 | End: 2023-11-22 | Stop reason: HOSPADM

## 2023-11-22 RX ORDER — FENTANYL CITRATE 50 UG/ML
INJECTION, SOLUTION INTRAMUSCULAR; INTRAVENOUS PRN
Status: DISCONTINUED | OUTPATIENT
Start: 2023-11-22 | End: 2023-11-22 | Stop reason: HOSPADM

## 2023-11-22 RX ORDER — FLUCONAZOLE 100 MG/1
100 TABLET ORAL DAILY
Qty: 15 TABLET | Refills: 0 | Status: SHIPPED | OUTPATIENT
Start: 2023-11-22

## 2023-11-22 RX ORDER — NALOXONE HYDROCHLORIDE 0.4 MG/ML
0.2 INJECTION, SOLUTION INTRAMUSCULAR; INTRAVENOUS; SUBCUTANEOUS
Status: DISCONTINUED | OUTPATIENT
Start: 2023-11-22 | End: 2023-11-22 | Stop reason: HOSPADM

## 2023-11-22 RX ORDER — ONDANSETRON 4 MG/1
4 TABLET, ORALLY DISINTEGRATING ORAL EVERY 6 HOURS PRN
Status: DISCONTINUED | OUTPATIENT
Start: 2023-11-22 | End: 2023-11-22 | Stop reason: HOSPADM

## 2023-11-22 RX ORDER — ONDANSETRON 2 MG/ML
4 INJECTION INTRAMUSCULAR; INTRAVENOUS EVERY 6 HOURS PRN
Status: DISCONTINUED | OUTPATIENT
Start: 2023-11-22 | End: 2023-11-22 | Stop reason: HOSPADM

## 2023-11-22 RX ORDER — LIDOCAINE 40 MG/G
CREAM TOPICAL
Status: DISCONTINUED | OUTPATIENT
Start: 2023-11-22 | End: 2023-11-22 | Stop reason: HOSPADM

## 2023-11-22 ASSESSMENT — ACTIVITIES OF DAILY LIVING (ADL): ADLS_ACUITY_SCORE: 35

## 2023-11-22 NOTE — OR NURSING
Pt underwent EGD with biopsies and colonoscopy with polypectomy under Conscious sedation. Specimens: Obtained and sent. Pt transferred to recovery and report given to francisco URIAS.           Kizzy Marshall RN on 11/22/2023 at 12:01 PM

## 2023-11-22 NOTE — H&P
Gastroenterology Pre-op History and Physical    Silke Moralez MRN# [id number]   Age: 73 year old YOB: 1950      Date of Surgery: 11/22/23  Location [facility]      Date of Exam 11/22/2023 Facility Same Day       Primary care provider: Paulino Harper         Chief Complaint and/or Reason for Procedure:   74 yo male for screening colonoscopy. Reported history of prior exam in [place] - unknown results. Pt unaware of having had polyps.    Chronic reflux. Reports being told in [place] to have regular exams due to scarring. Has never heard mention of Anne's and denies dysphagia. Takes prilosec daily.   EGD 2014 without report of Anne's or stricture.           Past Medical and Surgical History:     Past Medical History:   Diagnosis Date    Gastric ulcer     Heart attack (H)     Hyperlipidemia     Smoker      Past Surgical History:   Procedure Laterality Date    ESOPHAGOSCOPY, GASTROSCOPY, DUODENOSCOPY (EGD), COMBINED  6/30/2014    Procedure: COMBINED ESOPHAGOSCOPY, GASTROSCOPY, DUODENOSCOPY (EGD);  Surgeon: Stone Wan MD;  Location:  GI    NO HISTORY OF SURGERY              Medications (include herbals and vitamins):        Medications Prior to Admission   Medication Sig Dispense Refill Last Dose    amLODIPine (NORVASC) 5 MG tablet Take 1 tablet (5 mg) by mouth At Bedtime 90 tablet 3 11/21/2023    aspirin 81 MG EC tablet Take 1 tablet (81 mg) by mouth every other day 100 tablet 3 Past Week    atorvastatin (LIPITOR) 20 MG tablet Take 1 tablet (20 mg) by mouth daily 90 tablet 3 Past Week    calcium carbonate (TUMS) 500 MG chewable tablet Take 2 chew tab by mouth 2 times daily as needed for heartburn   11/21/2023    fluticasone-salmeterol (ADVAIR) 100-50 MCG/ACT inhaler Inhale 1 puff into the lungs every 12 hours 3 each 3 11/22/2023 at 0700    metoprolol succinate ER (TOPROL XL) 25 MG 24 hr tablet Take 0.5 tablets (12.5 mg) by mouth daily 90 tablet 3 Past Week     omeprazole (PRILOSEC) 20 MG DR capsule Take 1 capsule (20 mg) by mouth daily as needed (acid reflux) 90 capsule 0 11/21/2023    predniSONE (DELTASONE) 20 MG tablet Take 20 mg by mouth daily (Patient not taking: Reported on 9/12/2023)       UNABLE TO FIND 2.5 mg daily MEDICATION NAME: Levamlodipine besylate (Patient not taking: Reported on 11/17/2022)                Allergies:    No Known Allergies            Physical Exam:   All vitals have been reviewed  Patient Vitals for the past 8 hrs:   BP Pulse Resp SpO2   11/22/23 1052 125/70 79 16 98 %     No intake/output data recorded.  Airway assessment:   Patient is able to open mouth wide  Patient is able to stick out tongue  Mallampatti classification: Class II (visualization of the soft palate, fauces, and uvula)}      Lungs:   No increased work of breathing, good air exchange, clear to auscultation bilaterally, no crackles or wheezing     Cardiovascular:   regular rate and rhythm and normal S1 and S2                 Anesthetic risk and/or ASA classification:     ASA 1    Paulino Shea MD

## 2023-11-24 LAB
PATH REPORT.COMMENTS IMP SPEC: NORMAL
PATH REPORT.COMMENTS IMP SPEC: NORMAL
PATH REPORT.FINAL DX SPEC: NORMAL
PATH REPORT.GROSS SPEC: NORMAL
PATH REPORT.MICROSCOPIC SPEC OTHER STN: NORMAL
PATH REPORT.RELEVANT HX SPEC: NORMAL
PHOTO IMAGE: NORMAL

## 2023-12-12 ENCOUNTER — MYC MEDICAL ADVICE (OUTPATIENT)
Dept: INTERNAL MEDICINE | Facility: CLINIC | Age: 73
End: 2023-12-12
Payer: COMMERCIAL

## 2023-12-12 DIAGNOSIS — K06.9 GUM DISEASE: Primary | ICD-10-CM

## 2023-12-13 NOTE — TELEPHONE ENCOUNTER
Patient would like a Dental referral to     Judi Little DDS, MS, PhD  Diplomate, American Board of Periodontology     specialty: Periodontics      Phone: [phone]     [facility] [place] [id number].  [address]  [place] [address]       Once this is approved by Dr. Harper it will be faxed to #[phone].     Sariah Iraheta RN on 12/13/2023 at 1:41 PM

## 2023-12-15 NOTE — TELEPHONE ENCOUNTER
Faxed dental referral to [facility] and dental center @ [phone]    Sariah Iraheta RN on 12/15/2023 at 12:16 PM

## 2023-12-29 ENCOUNTER — TELEPHONE (OUTPATIENT)
Dept: INTERNAL MEDICINE | Facility: CLINIC | Age: 73
End: 2023-12-29
Payer: COMMERCIAL

## 2023-12-29 NOTE — TELEPHONE ENCOUNTER
Left Voicemail (1st Attempt) for the patient to call back and schedule the following:    Appointment type: P RETURN  Provider: PCP  Return date: 3/12/24

## 2024-01-02 ENCOUNTER — MYC MEDICAL ADVICE (OUTPATIENT)
Dept: INTERNAL MEDICINE | Facility: CLINIC | Age: 74
End: 2024-01-02
Payer: COMMERCIAL

## 2024-01-09 ENCOUNTER — DOCUMENTATION ONLY (OUTPATIENT)
Dept: CARDIOLOGY | Facility: CLINIC | Age: 74
End: 2024-01-09
Payer: COMMERCIAL

## 2024-01-10 ENCOUNTER — PRE VISIT (OUTPATIENT)
Dept: CARDIOLOGY | Facility: CLINIC | Age: 74
End: 2024-01-10

## 2024-01-10 ENCOUNTER — LAB (OUTPATIENT)
Dept: LAB | Facility: CLINIC | Age: 74
End: 2024-01-10
Payer: COMMERCIAL

## 2024-01-10 DIAGNOSIS — I25.10 CORONARY ARTERY DISEASE INVOLVING NATIVE CORONARY ARTERY OF NATIVE HEART WITHOUT ANGINA PECTORIS: ICD-10-CM

## 2024-01-10 DIAGNOSIS — I25.10 CORONARY ARTERY DISEASE INVOLVING NATIVE CORONARY ARTERY OF NATIVE HEART WITHOUT ANGINA PECTORIS: Primary | ICD-10-CM

## 2024-01-10 DIAGNOSIS — I21.4 NSTEMI (NON-ST ELEVATED MYOCARDIAL INFARCTION) (H): ICD-10-CM

## 2024-01-10 LAB
ANION GAP SERPL CALCULATED.3IONS-SCNC: 8 MMOL/L (ref 7–15)
BUN SERPL-MCNC: 11.2 MG/DL (ref 8–23)
CALCIUM SERPL-MCNC: 9.5 MG/DL (ref 8.8–10.2)
CHLORIDE SERPL-SCNC: 104 MMOL/L (ref 98–107)
CREAT SERPL-MCNC: 1 MG/DL (ref 0.67–1.17)
DEPRECATED HCO3 PLAS-SCNC: 27 MMOL/L (ref 22–29)
EGFRCR SERPLBLD CKD-EPI 2021: 79 ML/MIN/1.73M2
GLUCOSE SERPL-MCNC: 110 MG/DL (ref 70–99)
HOLD SPECIMEN: NORMAL
POTASSIUM SERPL-SCNC: 4.6 MMOL/L (ref 3.4–5.3)
SODIUM SERPL-SCNC: 139 MMOL/L (ref 135–145)

## 2024-01-10 PROCEDURE — 36415 COLL VENOUS BLD VENIPUNCTURE: CPT | Performed by: PATHOLOGY

## 2024-01-10 PROCEDURE — 80061 LIPID PANEL: CPT | Performed by: PATHOLOGY

## 2024-01-10 PROCEDURE — 80048 BASIC METABOLIC PNL TOTAL CA: CPT | Performed by: PATHOLOGY

## 2024-01-12 ENCOUNTER — TELEPHONE (OUTPATIENT)
Dept: CARDIOLOGY | Facility: CLINIC | Age: 74
End: 2024-01-12
Payer: COMMERCIAL

## 2024-01-12 ENCOUNTER — MYC MEDICAL ADVICE (OUTPATIENT)
Dept: CARDIOLOGY | Facility: CLINIC | Age: 74
End: 2024-01-12
Payer: COMMERCIAL

## 2024-01-12 DIAGNOSIS — K06.8 BLEEDING GUMS: ICD-10-CM

## 2024-01-12 DIAGNOSIS — I21.4 NSTEMI (NON-ST ELEVATED MYOCARDIAL INFARCTION) (H): Primary | ICD-10-CM

## 2024-01-12 LAB
CHOLEST SERPL-MCNC: 141 MG/DL
HDLC SERPL-MCNC: 49 MG/DL
LDLC SERPL CALC-MCNC: 73 MG/DL
NONHDLC SERPL-MCNC: 92 MG/DL
TRIGL SERPL-MCNC: 95 MG/DL

## 2024-01-12 NOTE — TELEPHONE ENCOUNTER
Patient Contacted (Pt's wife contacted) for the patient to call back and schedule the following:    Appointment type: fasting labs  Provider: RELL  Return date: prior to appt on 1/15 at 1:30PM  Specialty phone number: [phone] option 1  Additional appointment(s) needed: RELL  Additonal Notes: 1/12 Talked w/ pt's wife. She said pt got labs on 1/10 and said she talked w/ a nurse already. If he still needs labs she said they would go tomorrow. Sent staff message to Ele FRASER

## 2024-01-12 NOTE — TELEPHONE ENCOUNTER
----- Message from Lissett Gutierres RN sent at 1/12/2024 12:22 PM CST -----  Regarding: labs  Hi,  Can you also call & schedule Karyn, tell him we need fasting labs for his Monday January 15 prior to his visit with Brianna Stephenson NP.  Thanks   Ele

## 2024-01-12 NOTE — TELEPHONE ENCOUNTER
Left Voicemail (1st Attempt) and Sent Mychart (1st Attempt) for the patient to call back and schedule the following:    Appointment type: Fasting labs  Provider: RELL  Return date: Prior to Brianna Stephenson appt at 1:30PM on 1/15/2024  Specialty phone number: [phone] option 1  Additional appointment(s) needed: NA  Additonal Notes: 1/12 LVM and MADDY for pt to schedule fasting labs prior to appt w/ Brianna Stephenson on 1/15 at 1:30PM. MB

## 2024-01-12 NOTE — TELEPHONE ENCOUNTER
----- Message from Lissett Gutierres RN sent at 1/12/2024  1:12 PM CST -----  Regarding: RE: labs  thanks  ----- Message -----  From: Susy Wills  Sent: 1/12/2024  12:34 PM CST  To: Lissett Gutierres RN  Subject: RE: labs                                         Hi,    I left a voicemail, but I will follow-up after my lunch today, before I leave for the night, and right away on Monday morning. Let me know if there's anything else I can do for this pt.    Thank you,    Susy    ----- Message -----  From: Lissett Gutierres RN  Sent: 1/12/2024  12:23 PM CST  To: [facility]  Subject: labs                                             Hi,  Can you also call & schedule Karyn, tell him we need fasting labs for his Monday January 15 prior to his visit with Brianna Stephenson NP.  Uche Marlow

## 2024-01-15 ENCOUNTER — TELEPHONE (OUTPATIENT)
Dept: CARDIOLOGY | Facility: CLINIC | Age: 74
End: 2024-01-15
Payer: COMMERCIAL

## 2024-01-15 ENCOUNTER — OFFICE VISIT (OUTPATIENT)
Dept: CARDIOLOGY | Facility: CLINIC | Age: 74
End: 2024-01-15
Attending: CASE MANAGER/CARE COORDINATOR
Payer: COMMERCIAL

## 2024-01-15 VITALS
OXYGEN SATURATION: 94 % | WEIGHT: 171.8 LBS | BODY MASS INDEX: 27.88 KG/M2 | HEART RATE: 72 BPM | DIASTOLIC BLOOD PRESSURE: 79 MMHG | SYSTOLIC BLOOD PRESSURE: 137 MMHG

## 2024-01-15 DIAGNOSIS — I25.10 CORONARY ARTERY DISEASE INVOLVING NATIVE CORONARY ARTERY OF NATIVE HEART WITHOUT ANGINA PECTORIS: ICD-10-CM

## 2024-01-15 DIAGNOSIS — I21.4 NSTEMI (NON-ST ELEVATED MYOCARDIAL INFARCTION) (H): Primary | ICD-10-CM

## 2024-01-15 DIAGNOSIS — I25.5 ISCHEMIC CARDIOMYOPATHY: ICD-10-CM

## 2024-01-15 DIAGNOSIS — E78.5 HYPERLIPIDEMIA, UNSPECIFIED HYPERLIPIDEMIA TYPE: ICD-10-CM

## 2024-01-15 DIAGNOSIS — I10 PRIMARY HYPERTENSION: ICD-10-CM

## 2024-01-15 PROCEDURE — G0463 HOSPITAL OUTPT CLINIC VISIT: HCPCS | Performed by: CASE MANAGER/CARE COORDINATOR

## 2024-01-15 PROCEDURE — 99214 OFFICE O/P EST MOD 30 MIN: CPT | Performed by: CASE MANAGER/CARE COORDINATOR

## 2024-01-15 ASSESSMENT — PAIN SCALES - GENERAL: PAINLEVEL: NO PAIN (0)

## 2024-01-15 NOTE — PATIENT INSTRUCTIONS
You were seen today in the Cardiovascular Clinic at the [facility] by:       ADELA MONTOYA CNP    Your visit summary and instructions are as follows:    No medication changes  Continue healthy eating & exercise      Return to cardiology clinic in 1 year     Thank you for your visit today!     Please MyChart message me or call my nurse if you have any questions or concerns.      During Business Hours:  [phone], option # 1 (University) then option # 4 (medical questions) and ask to speak with my nurse.     After hours, weekends or holidays:   [phone], Option #4  Ask to speak to the On-Call Cardiologist. Inform them you are a cardiology patient at the [facility].

## 2024-01-15 NOTE — LETTER
1/15/2024      RE: Silke Moralez  [address]  [facility] [id number]       Dear Colleague,    Thank you for the opportunity to participate in the care of your patient, Silke Moralez, at the [facility]. Please see a copy of my visit note below.    [facility] [place]      HPI:   Mr. Silke Moralez is a pleasant 73 year old male with medical history pertinent for NSTEMI s/p POBA diagonal branch (2019), ischemic cardiomyopathy, HLD, HTN and CAD. He presents to cardiology clinic with his wife for annual follow up.    Today in clinic, he denies chest pain, palpitations, dizziness, shortness of breath, syncope, or lower extremity edema. His physical activity includes walking, stationary bike, or shoveling snow. He has had issues with gum bleeding and is seeing a periodontist, had briefly stopped aspirin due to the bleeding but has restarted.    PAST MEDICAL HISTORY:  Past Medical History:   Diagnosis Date    Gastric ulcer     Heart attack (H)     Hyperlipidemia     Smoker        FAMILY HISTORY:  Family History   Problem Relation Age of Onset    Hypertension Father         sibling    Heart Disease Father     Glaucoma No family hx of     Macular Degeneration No family hx of        SOCIAL HISTORY:  Social History     Socioeconomic History    Marital status:    Tobacco Use    Smoking status: Former     Packs/day: 1     Types: Cigarettes     Quit date: 2017     Years since quittin.3    Smokeless tobacco: Never    Tobacco comments:     40 - 80 pack year history.  Smoked up to two packs per day. Trying to cut back   Substance and Sexual Activity    Alcohol use: No    Drug use: No       CURRENT MEDICATIONS:  amLODIPine (NORVASC) 5 MG tablet, Take 1 tablet (5 mg) by mouth at bedtime  aspirin 81 MG EC tablet, Take 1 tablet (81 mg) by mouth every other day  atorvastatin (LIPITOR) 20 MG tablet, Take 1  tablet (20 mg) by mouth daily  calcium carbonate (TUMS) 500 MG chewable tablet, Take 2 chew tab by mouth 2 times daily as needed for heartburn  fluconazole (DIFLUCAN) 100 MG tablet, Take 1 tablet (100 mg) by mouth daily Take two tablets on the first day.  fluticasone-salmeterol (ADVAIR) 100-50 MCG/ACT inhaler, Inhale 1 puff into the lungs every 12 hours  metoprolol succinate ER (TOPROL XL) 25 MG 24 hr tablet, Take 0.5 tablets (12.5 mg) by mouth daily  omeprazole (PRILOSEC) 20 MG DR capsule, Take 1 capsule (20 mg) by mouth daily as needed (acid reflux)  predniSONE (DELTASONE) 20 MG tablet, Take 20 mg by mouth daily (Patient not taking: Reported on 9/12/2023)  UNABLE TO FIND, 2.5 mg daily MEDICATION NAME: Levamlodipine besylate (Patient not taking: Reported on 11/17/2022)    sodium chloride (PF) 0.9% PF flush 10 mL        ROS:   Refer to HPI    EXAM:  /79 (BP Location: Right arm, Patient Position: Chair, Cuff Size: Adult Regular)   Pulse 72   Wt 77.9 kg (171 lb 12.8 oz)   SpO2 94%   BMI 27.88 kg/m    GENERAL: Appears comfortable, in no acute distress.   HEENT: Eye symmetrical, no discharge or icterus bilaterally. Mucous membranes moist and without lesions.  CV: RRR, +S1S2, no murmur, rub, or gallop.    RESPIRATORY: Respirations regular, even, and unlabored. Lungs CTA throughout.   GI: Soft and non distended with normoactive bowel sounds present in all quadrants. No tenderness, rebound, guarding.   EXTREMITIES: no peripheral edema. 2+ bilateral pedal pulses.   NEUROLOGIC: Alert and oriented x 3. No focal deficits.   MUSCULOSKELETAL: No joint swelling or tenderness.   SKIN: No jaundice. No rashes or lesions.     Labs, reviewed with patient in clinic today:  CBC RESULTS:  Lab Results   Component Value Date    WBC 4.5 09/08/2023    WBC 5.2 06/19/2019    RBC 4.17 (L) 09/08/2023    RBC 4.30 (L) 06/19/2019    HGB 13.9 09/08/2023    HGB 14.2 06/19/2019    HCT 40.0 09/08/2023    HCT 42.5 06/19/2019    MCV 96  "09/08/2023    MCV 99 06/19/2019    MCH 33.3 (H) 09/08/2023    MCH 33.0 06/19/2019    MCHC 34.8 09/08/2023    MCHC 33.4 06/19/2019    RDW 11.4 09/08/2023    RDW 11.8 06/19/2019     (L) 09/08/2023     (L) 06/19/2019       CMP RESULTS:  Lab Results   Component Value Date     01/10/2024     06/19/2019    POTASSIUM 4.6 01/10/2024    POTASSIUM 4.1 09/08/2021    POTASSIUM 4.3 06/19/2019    CHLORIDE 104 01/10/2024    CHLORIDE 112 (H) 09/08/2021    CHLORIDE 110 (H) 06/19/2019    CO2 27 01/10/2024    CO2 27 09/08/2021    CO2 26 06/19/2019    ANIONGAP 8 01/10/2024    ANIONGAP 5 09/08/2021    ANIONGAP 5 06/19/2019     (H) 01/10/2024    GLC 92 09/08/2021    GLC 85 06/19/2019    BUN 11.2 01/10/2024    BUN 17 09/08/2021    BUN 20 06/19/2019    CR 1.00 01/10/2024    CR 0.87 06/19/2019    GFRESTIMATED 79 01/10/2024    GFRESTIMATED 87 06/19/2019    GFRESTBLACK >90 06/19/2019    CECILIO 9.5 01/10/2024    CECILIO 9.0 06/19/2019    BILITOTAL 0.4 11/15/2022    BILITOTAL 0.8 06/19/2019    ALBUMIN 4.3 11/15/2022    ALBUMIN 3.8 09/08/2021    ALBUMIN 4.1 06/19/2019    ALKPHOS 73 11/15/2022    ALKPHOS 79 06/19/2019    ALT 23 11/15/2022    ALT 24 06/19/2019    AST 21 11/15/2022    AST 20 06/19/2019        INR RESULTS:  No results found for: \"INR\"    Lab Results   Component Value Date    MAG 2.3 06/28/2007     No results found for: \"NTBNPI\"  No results found for: \"NTBNP\"    LIPIDS:  Recent Labs   Lab Test 01/10/24  0922 09/08/23  0721   CHOL 141 135   HDL 49 51   LDL 73 63   TRIG 95 103     s or any previous visit (from the past 4320 hour(s)).    CMR 3/2020:  SUMMARY   ==========================================================================================================     Clinical history: 70-year old male with a history of CAD (NSTEMI while in [place] leading to POBA of a  diagonal branch), LVEF 45-50% on echo,            and cardiomyopathy that is felt to be out of proportion to CAD. CMR  to evaluate etiology of " CM.   Comparison CMR: None.     1. The LV is normal in cavity size and wall thickness. The global systolic function is mildly reduced. The  LVEF is 52%. There is mild hypokinesis of the anterolateral segments.     2. The RV is normal in cavity size. The global systolic function is normal. The RVEF is 68%.      3. Both atria are normal in size.     4. There is mild aortic regurgitation.     5. Late gadolinium enhancement imaging shows subendocardial enhancement in the basal anterior and the  entire anterolateral segments. There is also enhancement of the anterolateral papillary muscle. This is  consistent with a LAD/D1 culprit infarction. There is 70% viability in the LAD territory and 100% viability  in the LCx and RCA territories.      6. There is no pericardial effusion or thickening.     7. There is no intracardiac thrombus.     8. There is a small liver cyst.      CONCLUSIONS: Ischemic cardiomyopathy, LVEF 52% and RVEF 68%. Prior LAD/D1 infarction, with 70% viability in  the LAD territory and 100% viability in the LCx and RCA territories.        Assessment and Plan:   Mr. Moralez is a 73 year old male with a PMH of NSTEMI s/p POBA diagonal branch, ischemic cardiomyopathy, HLD, HTN and CAD.    # NSTEMI  # CAD  NSTEMI while in [place] (2019) s/p POBA with no stents placed.  - cont asa 81mg daily  - cont atorvastatin 20  - continue Toprol 12.5mg daily    # Ischemic cardiomyopathy  CMR 3/2020 shows LVEF mildly decreased at 52% with LGE in basal anterior and the entire anterolateral segments. Also enhancement of the anterolateral papillary muscle, consistent with LAD/D1 infarction.  - continue Toprol XL 12.5mg daily, unable to take ACEi or ARB    # HTN  Normotensive in clinic  - amlodipine 5mg daily   - metoprolol tartrate 12.5mg BID    # HLD  Well controlled, LDL 73  - atorvastatin 20mg daily    Follow up:  1 year  Chart review time today: 10 minutes  Visit time today: 15 minutes  Total time spent today: 25  minutes    SAMANTHA EDMONDS, ADELA CNP  General Cardiology   01/15/24

## 2024-01-15 NOTE — PROGRESS NOTES
[facility] [place]   Cardiology Clinic Note      HPI:   Mr. Silke Moralez is a pleasant 73 year old male with medical history pertinent for NSTEMI s/p POBA diagonal branch (2019), ischemic cardiomyopathy, HLD, HTN and CAD. He presents to cardiology clinic with his wife for annual follow up.    Today in clinic, he denies chest pain, palpitations, dizziness, shortness of breath, syncope, or lower extremity edema. His physical activity includes walking, stationary bike, or shoveling snow. He has had issues with gum bleeding and is seeing a periodontist, had briefly stopped aspirin due to the bleeding but has restarted.    PAST MEDICAL HISTORY:  Past Medical History:   Diagnosis Date    Gastric ulcer     Heart attack (H)     Hyperlipidemia     Smoker        FAMILY HISTORY:  Family History   Problem Relation Age of Onset    Hypertension Father         sibling    Heart Disease Father     Glaucoma No family hx of     Macular Degeneration No family hx of        SOCIAL HISTORY:  Social History     Socioeconomic History    Marital status:    Tobacco Use    Smoking status: Former     Packs/day: 1     Types: Cigarettes     Quit date: 2017     Years since quittin.3    Smokeless tobacco: Never    Tobacco comments:     40 - 80 pack year history.  Smoked up to two packs per day. Trying to cut back   Substance and Sexual Activity    Alcohol use: No    Drug use: No       CURRENT MEDICATIONS:  amLODIPine (NORVASC) 5 MG tablet, Take 1 tablet (5 mg) by mouth at bedtime  aspirin 81 MG EC tablet, Take 1 tablet (81 mg) by mouth every other day  atorvastatin (LIPITOR) 20 MG tablet, Take 1 tablet (20 mg) by mouth daily  calcium carbonate (TUMS) 500 MG chewable tablet, Take 2 chew tab by mouth 2 times daily as needed for heartburn  fluconazole (DIFLUCAN) 100 MG tablet, Take 1 tablet (100 mg) by mouth daily Take two tablets on the first day.  fluticasone-salmeterol (ADVAIR) 100-50 MCG/ACT inhaler, Inhale 1 puff into the  lungs every 12 hours  metoprolol succinate ER (TOPROL XL) 25 MG 24 hr tablet, Take 0.5 tablets (12.5 mg) by mouth daily  omeprazole (PRILOSEC) 20 MG DR capsule, Take 1 capsule (20 mg) by mouth daily as needed (acid reflux)  predniSONE (DELTASONE) 20 MG tablet, Take 20 mg by mouth daily (Patient not taking: Reported on 9/12/2023)  UNABLE TO FIND, 2.5 mg daily MEDICATION NAME: Levamlodipine besylate (Patient not taking: Reported on 11/17/2022)    sodium chloride (PF) 0.9% PF flush 10 mL        ROS:   Refer to HPI    EXAM:  /79 (BP Location: Right arm, Patient Position: Chair, Cuff Size: Adult Regular)   Pulse 72   Wt 77.9 kg (171 lb 12.8 oz)   SpO2 94%   BMI 27.88 kg/m    GENERAL: Appears comfortable, in no acute distress.   HEENT: Eye symmetrical, no discharge or icterus bilaterally. Mucous membranes moist and without lesions.  CV: RRR, +S1S2, no murmur, rub, or gallop.    RESPIRATORY: Respirations regular, even, and unlabored. Lungs CTA throughout.   GI: Soft and non distended with normoactive bowel sounds present in all quadrants. No tenderness, rebound, guarding.   EXTREMITIES: no peripheral edema. 2+ bilateral pedal pulses.   NEUROLOGIC: Alert and oriented x 3. No focal deficits.   MUSCULOSKELETAL: No joint swelling or tenderness.   SKIN: No jaundice. No rashes or lesions.     Labs, reviewed with patient in clinic today:  CBC RESULTS:  Lab Results   Component Value Date    WBC 4.5 09/08/2023    WBC 5.2 06/19/2019    RBC 4.17 (L) 09/08/2023    RBC 4.30 (L) 06/19/2019    HGB 13.9 09/08/2023    HGB 14.2 06/19/2019    HCT 40.0 09/08/2023    HCT 42.5 06/19/2019    MCV 96 09/08/2023    MCV 99 06/19/2019    MCH 33.3 (H) 09/08/2023    MCH 33.0 06/19/2019    MCHC 34.8 09/08/2023    MCHC 33.4 06/19/2019    RDW 11.4 09/08/2023    RDW 11.8 06/19/2019     (L) 09/08/2023     (L) 06/19/2019       CMP RESULTS:  Lab Results   Component Value Date     01/10/2024     06/19/2019    POTASSIUM 4.6  "01/10/2024    POTASSIUM 4.1 09/08/2021    POTASSIUM 4.3 06/19/2019    CHLORIDE 104 01/10/2024    CHLORIDE 112 (H) 09/08/2021    CHLORIDE 110 (H) 06/19/2019    CO2 27 01/10/2024    CO2 27 09/08/2021    CO2 26 06/19/2019    ANIONGAP 8 01/10/2024    ANIONGAP 5 09/08/2021    ANIONGAP 5 06/19/2019     (H) 01/10/2024    GLC 92 09/08/2021    GLC 85 06/19/2019    BUN 11.2 01/10/2024    BUN 17 09/08/2021    BUN 20 06/19/2019    CR 1.00 01/10/2024    CR 0.87 06/19/2019    GFRESTIMATED 79 01/10/2024    GFRESTIMATED 87 06/19/2019    GFRESTBLACK >90 06/19/2019    CECILIO 9.5 01/10/2024    CECILIO 9.0 06/19/2019    BILITOTAL 0.4 11/15/2022    BILITOTAL 0.8 06/19/2019    ALBUMIN 4.3 11/15/2022    ALBUMIN 3.8 09/08/2021    ALBUMIN 4.1 06/19/2019    ALKPHOS 73 11/15/2022    ALKPHOS 79 06/19/2019    ALT 23 11/15/2022    ALT 24 06/19/2019    AST 21 11/15/2022    AST 20 06/19/2019        INR RESULTS:  No results found for: \"INR\"    Lab Results   Component Value Date    MAG 2.3 06/28/2007     No results found for: \"NTBNPI\"  No results found for: \"NTBNP\"    LIPIDS:  Recent Labs   Lab Test 01/10/24  0922 09/08/23  0721   CHOL 141 135   HDL 49 51   LDL 73 63   TRIG 95 103     s or any previous visit (from the past 4320 hour(s)).    [facility] 3/2020:  SUMMARY   ==========================================================================================================     Clinical history: 70-year old male with a history of CAD (NSTEMI while in [place] leading to POBA of a  diagonal branch), LVEF 45-50% on echo,            and cardiomyopathy that is felt to be out of proportion to CAD. CMR  to evaluate etiology of CM.   Comparison CMR: None.     1. The LV is normal in cavity size and wall thickness. The global systolic function is mildly reduced. The  LVEF is 52%. There is mild hypokinesis of the anterolateral segments.     2. The RV is normal in cavity size. The global systolic function is normal. The RVEF is 68%.      3. Both atria are normal in " size.     4. There is mild aortic regurgitation.     5. Late gadolinium enhancement imaging shows subendocardial enhancement in the basal anterior and the  entire anterolateral segments. There is also enhancement of the anterolateral papillary muscle. This is  consistent with a LAD/D1 culprit infarction. There is 70% viability in the LAD territory and 100% viability  in the LCx and RCA territories.      6. There is no pericardial effusion or thickening.     7. There is no intracardiac thrombus.     8. There is a small liver cyst.      CONCLUSIONS: Ischemic cardiomyopathy, LVEF 52% and RVEF 68%. Prior LAD/D1 infarction, with 70% viability in  the LAD territory and 100% viability in the LCx and RCA territories.        Assessment and Plan:   Mr. Moralez is a 73 year old male with a PMH of NSTEMI s/p POBA diagonal branch, ischemic cardiomyopathy, HLD, HTN and CAD.    # NSTEMI  # CAD  NSTEMI while in [place] (2019) s/p POBA with no stents placed.  - cont asa 81mg daily  - cont atorvastatin 20  - continue Toprol 12.5mg daily    # Ischemic cardiomyopathy  CMR 3/2020 shows LVEF mildly decreased at 52% with LGE in basal anterior and the entire anterolateral segments. Also enhancement of the anterolateral papillary muscle, consistent with LAD/D1 infarction.  - continue Toprol XL 12.5mg daily, unable to take ACEi or ARB    # HTN  Normotensive in clinic  - amlodipine 5mg daily   - metoprolol tartrate 12.5mg BID    # HLD  Well controlled, LDL 73  - atorvastatin 20mg daily    Follow up:  1 year  Chart review time today: 10 minutes  Visit time today: 15 minutes  Total time spent today: 25 minutes    ADELA BARNES CNP  General Cardiology   01/15/24

## 2024-01-15 NOTE — NURSING NOTE
Chief Complaint   Patient presents with    Follow Up     Return Cardiology- bleeding gums, annual visit to manage coronary artery disease involving native vessel     Vitals were taken and medications reconciled.    Jagdish Abraham, EMT  1:36 PM      Pt provided hospital socks to be placed over current sock per pt request

## 2024-03-06 DIAGNOSIS — R12 HEARTBURN: ICD-10-CM

## 2024-03-13 NOTE — TELEPHONE ENCOUNTER
Last Clinic Visit: 9/12/2023  [facility] Internal Medicine [place]

## 2024-08-14 ENCOUNTER — LAB (OUTPATIENT)
Dept: LAB | Facility: CLINIC | Age: 74
End: 2024-08-14
Payer: COMMERCIAL

## 2024-08-14 ENCOUNTER — OFFICE VISIT (OUTPATIENT)
Dept: INTERNAL MEDICINE | Facility: CLINIC | Age: 74
End: 2024-08-14
Payer: COMMERCIAL

## 2024-08-14 VITALS
DIASTOLIC BLOOD PRESSURE: 79 MMHG | BODY MASS INDEX: 27.29 KG/M2 | WEIGHT: 168.2 LBS | OXYGEN SATURATION: 95 % | SYSTOLIC BLOOD PRESSURE: 129 MMHG | HEART RATE: 70 BPM

## 2024-08-14 DIAGNOSIS — I21.4 NSTEMI (NON-ST ELEVATED MYOCARDIAL INFARCTION) (H): ICD-10-CM

## 2024-08-14 DIAGNOSIS — I25.10 CORONARY ARTERY DISEASE INVOLVING NATIVE CORONARY ARTERY OF NATIVE HEART WITHOUT ANGINA PECTORIS: ICD-10-CM

## 2024-08-14 DIAGNOSIS — R73.02 IGT (IMPAIRED GLUCOSE TOLERANCE): ICD-10-CM

## 2024-08-14 DIAGNOSIS — Z87.891 PERSONAL HISTORY OF NICOTINE DEPENDENCE: ICD-10-CM

## 2024-08-14 DIAGNOSIS — F17.200 SMOKER: ICD-10-CM

## 2024-08-14 DIAGNOSIS — R73.02 IGT (IMPAIRED GLUCOSE TOLERANCE): Primary | ICD-10-CM

## 2024-08-14 LAB
ALBUMIN SERPL BCG-MCNC: 4.3 G/DL (ref 3.5–5.2)
ALP SERPL-CCNC: 69 U/L (ref 40–150)
ALT SERPL W P-5'-P-CCNC: 18 U/L (ref 0–70)
ANION GAP SERPL CALCULATED.3IONS-SCNC: 10 MMOL/L (ref 7–15)
AST SERPL W P-5'-P-CCNC: 23 U/L (ref 0–45)
BASOPHILS # BLD AUTO: 0 10E3/UL (ref 0–0.2)
BASOPHILS NFR BLD AUTO: 1 %
BILIRUB SERPL-MCNC: 0.3 MG/DL
BUN SERPL-MCNC: 12.8 MG/DL (ref 8–23)
CALCIUM SERPL-MCNC: 9.6 MG/DL (ref 8.8–10.4)
CHLORIDE SERPL-SCNC: 107 MMOL/L (ref 98–107)
CHOLEST SERPL-MCNC: 134 MG/DL
CREAT SERPL-MCNC: 0.82 MG/DL (ref 0.67–1.17)
EGFRCR SERPLBLD CKD-EPI 2021: >90 ML/MIN/1.73M2
EOSINOPHIL # BLD AUTO: 0.2 10E3/UL (ref 0–0.7)
EOSINOPHIL NFR BLD AUTO: 4 %
ERYTHROCYTE [DISTWIDTH] IN BLOOD BY AUTOMATED COUNT: 11.9 % (ref 10–15)
FASTING STATUS PATIENT QL REPORTED: NO
FASTING STATUS PATIENT QL REPORTED: NO
GLUCOSE SERPL-MCNC: 96 MG/DL (ref 70–99)
HBA1C MFR BLD: 5.6 %
HCO3 SERPL-SCNC: 23 MMOL/L (ref 22–29)
HCT VFR BLD AUTO: 38.3 % (ref 40–53)
HDLC SERPL-MCNC: 46 MG/DL
HGB BLD-MCNC: 13.4 G/DL (ref 13.3–17.7)
IMM GRANULOCYTES # BLD: 0 10E3/UL
IMM GRANULOCYTES NFR BLD: 0 %
LDLC SERPL CALC-MCNC: 56 MG/DL
LYMPHOCYTES # BLD AUTO: 2.2 10E3/UL (ref 0.8–5.3)
LYMPHOCYTES NFR BLD AUTO: 43 %
MCH RBC QN AUTO: 32.9 PG (ref 26.5–33)
MCHC RBC AUTO-ENTMCNC: 35 G/DL (ref 31.5–36.5)
MCV RBC AUTO: 94 FL (ref 78–100)
MONOCYTES # BLD AUTO: 0.4 10E3/UL (ref 0–1.3)
MONOCYTES NFR BLD AUTO: 8 %
NEUTROPHILS # BLD AUTO: 2.3 10E3/UL (ref 1.6–8.3)
NEUTROPHILS NFR BLD AUTO: 44 %
NONHDLC SERPL-MCNC: 88 MG/DL
NRBC # BLD AUTO: 0 10E3/UL
NRBC BLD AUTO-RTO: 0 /100
PLATELET # BLD AUTO: 134 10E3/UL (ref 150–450)
POTASSIUM SERPL-SCNC: 4.5 MMOL/L (ref 3.4–5.3)
PROT SERPL-MCNC: 7.4 G/DL (ref 6.4–8.3)
RBC # BLD AUTO: 4.07 10E6/UL (ref 4.4–5.9)
SODIUM SERPL-SCNC: 140 MMOL/L (ref 135–145)
TRIGL SERPL-MCNC: 158 MG/DL
WBC # BLD AUTO: 5.1 10E3/UL (ref 4–11)

## 2024-08-14 PROCEDURE — 36415 COLL VENOUS BLD VENIPUNCTURE: CPT | Performed by: PATHOLOGY

## 2024-08-14 PROCEDURE — 85025 COMPLETE CBC W/AUTO DIFF WBC: CPT | Performed by: PATHOLOGY

## 2024-08-14 PROCEDURE — 80053 COMPREHEN METABOLIC PANEL: CPT | Performed by: PATHOLOGY

## 2024-08-14 PROCEDURE — 99000 SPECIMEN HANDLING OFFICE-LAB: CPT | Performed by: PATHOLOGY

## 2024-08-14 PROCEDURE — 83036 HEMOGLOBIN GLYCOSYLATED A1C: CPT | Performed by: INTERNAL MEDICINE

## 2024-08-14 PROCEDURE — 99214 OFFICE O/P EST MOD 30 MIN: CPT | Performed by: INTERNAL MEDICINE

## 2024-08-14 PROCEDURE — 80061 LIPID PANEL: CPT | Performed by: PATHOLOGY

## 2024-08-14 RX ORDER — ATORVASTATIN CALCIUM 20 MG/1
20 TABLET, FILM COATED ORAL DAILY
Qty: 90 TABLET | Refills: 3 | Status: SHIPPED | OUTPATIENT
Start: 2024-08-14

## 2024-08-14 RX ORDER — METOPROLOL SUCCINATE 25 MG/1
12.5 TABLET, EXTENDED RELEASE ORAL DAILY
Qty: 45 TABLET | Refills: 3 | Status: SHIPPED | OUTPATIENT
Start: 2024-08-14

## 2024-08-14 RX ORDER — AMLODIPINE BESYLATE 5 MG/1
5 TABLET ORAL AT BEDTIME
Qty: 90 TABLET | Refills: 3 | Status: SHIPPED | OUTPATIENT
Start: 2024-08-14

## 2024-08-14 NOTE — PROGRESS NOTES
Silke is a 74 year old that presents in clinic today for the following:     Chief Complaint   Patient presents with    Follow Up     Pt wondering about CT Lung Screening           8/14/2024     2:44 PM   Additional Questions   Roomed by jea emt   Accompanied by wife     Screenings from encounters over the past 10 days    No data recorded       Hiren Payan at 2:48 PM on 8/14/2024

## 2024-08-14 NOTE — PROGRESS NOTES
HPI  74-year-old here today with his wife who assists with interpretation.  He has been doing well he recently returned from [place] in [place] he did have apparent upper respiratory infection associated with some congestion and cough.  This persisted for about 2 weeks but resolved spontaneously when he returned here.  Did concede that there was significant air pollution in [place] but he thinks he was exposed to somebody's virus.  He was not tested for COVID or other viruses but reports that he is completely recovered at this time.  He quit smoking 5 years ago he is due for follow-up lung cancer screening.  He exercises walking more than 10,000 steps a day he tolerates this well he said no shortness of breath no dizziness lightheadedness or chest pain in association with this.  He is watching his diet he is restricting the sugar and calories in the diet.  Otherwise no concerns expressed today.  Past Medical History:   Diagnosis Date    Gastric ulcer     Heart attack (H)     Hyperlipidemia     Smoker      Past Surgical History:   Procedure Laterality Date    COLONOSCOPY N/A 11/22/2023    Procedure: Colonoscopy;  Surgeon: Paulino Shea MD;  Location:  GI    ESOPHAGOSCOPY, GASTROSCOPY, DUODENOSCOPY (EGD), COMBINED  6/30/2014    Procedure: COMBINED ESOPHAGOSCOPY, GASTROSCOPY, DUODENOSCOPY (EGD);  Surgeon: Stone Wan MD;  Location: [facility]    ESOPHAGOSCOPY, GASTROSCOPY, DUODENOSCOPY (EGD), COMBINED N/A 11/22/2023    Procedure: Esophagoscopy, gastroscopy, duodenoscopy (EGD), combined;  Surgeon: Paulino Shea MD;  Location:  GI    NO HISTORY OF SURGERY       Family History   Problem Relation Age of Onset    Hypertension Father         sibling    Heart Disease Father     Glaucoma No family hx of     Macular Degeneration No family hx of          Exam:  /79 (BP Location: Right arm, Patient Position: Sitting, Cuff Size: Adult Regular)   Pulse 70   Wt 76.3 kg (168 lb 3.2 oz)   SpO2 95%   BMI  27.29 kg/m    168 lbs 3.2 oz  The patient is alert, oriented with a clear sensorium.   Skin shows no lesions or rashes and good turgor.   Head is normocephalic and atraumatic.    Neck shows no nodes, thyromegaly.     Lungs are clear.   Heart shows normal S1 and S2 without murmur or gallop.    Extremities show no edema.    Labs reviewed:  Results for orders placed or performed in visit on 08/14/24   Comprehensive metabolic panel     Status: None   Result Value Ref Range    Sodium 140 135 - 145 mmol/L    Potassium 4.5 3.4 - 5.3 mmol/L    Carbon Dioxide (CO2) 23 22 - 29 mmol/L    Anion Gap 10 7 - 15 mmol/L    Urea Nitrogen 12.8 8.0 - 23.0 mg/dL    Creatinine 0.82 0.67 - 1.17 mg/dL    GFR Estimate >90 >60 mL/min/1.73m2    Calcium 9.6 8.8 - 10.4 mg/dL    Chloride 107 98 - 107 mmol/L    Glucose 96 70 - 99 mg/dL    Alkaline Phosphatase 69 40 - 150 U/L    AST 23 0 - 45 U/L    ALT 18 0 - 70 U/L    Protein Total 7.4 6.4 - 8.3 g/dL    Albumin 4.3 3.5 - 5.2 g/dL    Bilirubin Total 0.3 <=1.2 mg/dL    Patient Fasting > 8hrs? No    Lipid Profile     Status: Abnormal   Result Value Ref Range    Cholesterol 134 <200 mg/dL    Triglycerides 158 (H) <150 mg/dL    Direct Measure HDL 46 >=40 mg/dL    LDL Cholesterol Calculated 56 <=100 mg/dL    Non HDL Cholesterol 88 <130 mg/dL    Patient Fasting > 8hrs? No     Narrative    Cholesterol  Desirable:  <200 mg/dL    Triglycerides  Normal:  Less than 150 mg/dL  Borderline High:  150-199 mg/dL  High:  200-499 mg/dL  Very High:  Greater than or equal to 500 mg/dL    Direct Measure HDL  Female:  Greater than or equal to 50 mg/dL   Male:  Greater than or equal to 40 mg/dL    LDL Cholesterol  Desirable:  <100mg/dL  Above Desirable:  100-129 mg/dL   Borderline High:  130-159 mg/dL   High:  160-189 mg/dL   Very High:  >= 190 mg/dL    Non HDL Cholesterol  Desirable:  130 mg/dL  Above Desirable:  130-159 mg/dL  Borderline High:  160-189 mg/dL  High:  190-219 mg/dL  Very High:  Greater than or equal to  220 mg/dL   Hemoglobin A1c     Status: Normal   Result Value Ref Range    Hemoglobin A1C 5.6 <5.7 %   CBC with platelets and differential     Status: Abnormal   Result Value Ref Range    WBC Count 5.1 4.0 - 11.0 10e3/uL    RBC Count 4.07 (L) 4.40 - 5.90 10e6/uL    Hemoglobin 13.4 13.3 - 17.7 g/dL    Hematocrit 38.3 (L) 40.0 - 53.0 %    MCV 94 78 - 100 fL    MCH 32.9 26.5 - 33.0 pg    MCHC 35.0 31.5 - 36.5 g/dL    RDW 11.9 10.0 - 15.0 %    Platelet Count 134 (L) 150 - 450 10e3/uL    % Neutrophils 44 %    % Lymphocytes 43 %    % Monocytes 8 %    % Eosinophils 4 %    % Basophils 1 %    % Immature Granulocytes 0 %    NRBCs per 100 WBC 0 <1 /100    Absolute Neutrophils 2.3 1.6 - 8.3 10e3/uL    Absolute Lymphocytes 2.2 0.8 - 5.3 10e3/uL    Absolute Monocytes 0.4 0.0 - 1.3 10e3/uL    Absolute Eosinophils 0.2 0.0 - 0.7 10e3/uL    Absolute Basophils 0.0 0.0 - 0.2 10e3/uL    Absolute Immature Granulocytes 0.0 <=0.4 10e3/uL    Absolute NRBCs 0.0 10e3/uL   CBC with Platelets & Differential     Status: Abnormal    Narrative    The following orders were created for panel order CBC with Platelets & Differential.  Procedure                               Abnormality         Status                     ---------                               -----------         ------                     CBC with platelets and d...[[id number]]  Abnormal            Final result                 Please view results for these tests on the individual orders.       ASSESSMENT  1 IGT  2 hyperlipidemia well controlled on atorvastatin 20 mg  3 hypertension controlled   4 former smoker needs CT lung cancer screening  5 GERD on omeprazole  6 CAD with STEMI 2019 with single-vessel disease S/P PCI      Plan  Will reassess his labs today we will set him up for CT lung cancer screening will have him follow-up for reassessment in 6 months follow-up sooner immediately if any increased symptoms or problems.  We refilled his medications.    This note was completed using  [employer] voice recognition software.      Paulino Harper MD  General Internal Medicine  [facility]  [phone]

## 2024-09-12 ENCOUNTER — ANCILLARY PROCEDURE (OUTPATIENT)
Dept: CT IMAGING | Facility: CLINIC | Age: 74
End: 2024-09-12
Attending: INTERNAL MEDICINE
Payer: COMMERCIAL

## 2024-09-12 DIAGNOSIS — F17.200 SMOKER: ICD-10-CM

## 2024-09-12 DIAGNOSIS — Z87.891 PERSONAL HISTORY OF NICOTINE DEPENDENCE: ICD-10-CM

## 2024-09-12 PROCEDURE — 71271 CT THORAX LUNG CANCER SCR C-: CPT | Mod: GC | Performed by: RADIOLOGY

## 2024-09-24 DIAGNOSIS — J44.9 CHRONIC OBSTRUCTIVE PULMONARY DISEASE, UNSPECIFIED COPD TYPE (H): ICD-10-CM

## 2024-09-24 DIAGNOSIS — I25.10 CORONARY ARTERY DISEASE INVOLVING NATIVE CORONARY ARTERY OF NATIVE HEART WITHOUT ANGINA PECTORIS: ICD-10-CM

## 2024-09-24 DIAGNOSIS — R12 HEARTBURN: ICD-10-CM

## 2024-10-01 RX ORDER — FLUTICASONE PROPIONATE AND SALMETEROL 100; 50 UG/1; UG/1
1 POWDER RESPIRATORY (INHALATION) EVERY 12 HOURS
Qty: 180 EACH | Refills: 3 | Status: SHIPPED | OUTPATIENT
Start: 2024-10-01

## 2024-10-01 RX ORDER — ASPIRIN 81 MG/1
81 TABLET ORAL EVERY OTHER DAY
Qty: 100 TABLET | Refills: 1 | Status: SHIPPED | OUTPATIENT
Start: 2024-10-01

## 2024-10-02 ENCOUNTER — MYC MEDICAL ADVICE (OUTPATIENT)
Dept: INTERNAL MEDICINE | Facility: CLINIC | Age: 74
End: 2024-10-02
Payer: COMMERCIAL

## 2024-10-06 ENCOUNTER — MYC MEDICAL ADVICE (OUTPATIENT)
Dept: INTERNAL MEDICINE | Facility: CLINIC | Age: 74
End: 2024-10-06
Payer: COMMERCIAL

## 2024-10-10 ENCOUNTER — OFFICE VISIT (OUTPATIENT)
Dept: INTERNAL MEDICINE | Facility: CLINIC | Age: 74
End: 2024-10-10
Payer: COMMERCIAL

## 2024-10-10 VITALS
DIASTOLIC BLOOD PRESSURE: 72 MMHG | OXYGEN SATURATION: 94 % | RESPIRATION RATE: 14 BRPM | BODY MASS INDEX: 27.47 KG/M2 | SYSTOLIC BLOOD PRESSURE: 118 MMHG | TEMPERATURE: 97.6 F | WEIGHT: 170.9 LBS | HEART RATE: 74 BPM | HEIGHT: 66 IN

## 2024-10-10 DIAGNOSIS — J20.9 ACUTE BRONCHITIS, UNSPECIFIED ORGANISM: Primary | ICD-10-CM

## 2024-10-10 DIAGNOSIS — Z00.00 PREVENTATIVE HEALTH CARE: ICD-10-CM

## 2024-10-10 PROCEDURE — 90480 ADMN SARSCOV2 VAC 1/ONLY CMP: CPT

## 2024-10-10 PROCEDURE — 99214 OFFICE O/P EST MOD 30 MIN: CPT | Mod: 25

## 2024-10-10 PROCEDURE — 91320 SARSCV2 VAC 30MCG TRS-SUC IM: CPT

## 2024-10-10 RX ORDER — PREDNISONE 20 MG/1
TABLET ORAL
COMMUNITY
Start: 2024-10-07

## 2024-10-10 RX ORDER — DOXYCYCLINE 100 MG/1
CAPSULE ORAL
COMMUNITY
Start: 2024-10-06

## 2024-10-10 NOTE — PROGRESS NOTES
Assessment & Plan     Acute bronchitis, unspecified organism  Hx COPD, moderate centrilobular emphysema  --Currently, symptoms improving post antibiotic therapy  --Reassured that scant hemoptysis was related to acute bronchitis      Preventative health care  - Covid-vaccine administered in clinic  - Plan for flu vaccine at outpatient pharmacy, per patient preference      Attending note: Pt was seen and examined by me and I agree with the A/P documentation above    Marah Barnes MD         No follow-ups on file.    Marge Edouard is a 74 year old, presenting for the following health issues:  Follow Up (COPD), Hemoptysis (02 Oct 2024), and Urgent Care Follow-up (06 Oct 2024)      10/10/2024     7:43 AM   Additional Questions   Roomed by ernesto   Accompanied by radha (wife)     HPI   Has history of COPD with moderate centrilobular emphysema, quit approximately 5-6 years ago. Most recent lung cancer screening 09/2024 low-dose CT scan with benign nodules. Approximately 10/2/2024, developed productive cough with yellow sputum and traces of blood; approximately one yellow/blood-tinged sputum each morning. Not associated with fevers, chills, sore throat, shortness of breath. Not associated with unintentional weight loss. Did travel to [place] in June/July of 2024 with upper respiratory symptoms which soon resolved. Not associated with hematuria, blood in stool, or unusual bleeding. Seen in urgent care 10/6/2024 with clear chest xray and negative d-dimer (records not yet available in chart); was diagnosed with acute bronchitis and prescribed 5 day course of doxycycline and prednisone 40mg daily. His productive cough with traces of blood has now resolved. Following up in clinic to discuss symptoms and need for additional management. Planning for ENT visit 10/11/2024 for ongoing hoarse voice in the setting of acid reflux and distant history of vocal cord nodules s/p removal.                  Objective    /72  "(BP Location: Right arm, Patient Position: Sitting, Cuff Size: Adult Regular)   Pulse 74   Temp 97.6  F (36.4  C) (Oral)   Resp 14   Ht 1.67 m (5' 5.75\")   Wt 77.5 kg (170 lb 14.4 oz)   SpO2 94%   BMI 27.80 kg/m    Body mass index is 27.8 kg/m .  Physical Exam               Signed Electronically by: Jolie Wolfe MD    "

## 2024-10-11 ENCOUNTER — TRANSFERRED RECORDS (OUTPATIENT)
Dept: HEALTH INFORMATION MANAGEMENT | Facility: CLINIC | Age: 74
End: 2024-10-11
Payer: COMMERCIAL

## 2024-11-02 ENCOUNTER — HEALTH MAINTENANCE LETTER (OUTPATIENT)
Age: 74
End: 2024-11-02

## 2024-11-25 ENCOUNTER — OFFICE VISIT (OUTPATIENT)
Dept: INTERNAL MEDICINE | Facility: CLINIC | Age: 74
End: 2024-11-25
Payer: COMMERCIAL

## 2024-11-25 VITALS
DIASTOLIC BLOOD PRESSURE: 75 MMHG | TEMPERATURE: 98.1 F | WEIGHT: 171.8 LBS | HEIGHT: 66 IN | HEART RATE: 83 BPM | SYSTOLIC BLOOD PRESSURE: 134 MMHG | RESPIRATION RATE: 18 BRPM | BODY MASS INDEX: 27.61 KG/M2 | OXYGEN SATURATION: 92 %

## 2024-11-25 DIAGNOSIS — B37.81 ESOPHAGEAL CANDIDIASIS (H): ICD-10-CM

## 2024-11-25 DIAGNOSIS — J44.9 CHRONIC OBSTRUCTIVE PULMONARY DISEASE, UNSPECIFIED COPD TYPE (H): ICD-10-CM

## 2024-11-25 RX ORDER — FLUTICASONE PROPIONATE AND SALMETEROL 100; 50 UG/1; UG/1
1 POWDER RESPIRATORY (INHALATION) EVERY 12 HOURS
Qty: 180 EACH | Refills: 5 | Status: SHIPPED | OUTPATIENT
Start: 2024-11-25

## 2024-11-25 RX ORDER — OMEGA-3S/DHA/EPA/FISH OIL/D3 300MG-1000
CAPSULE ORAL
COMMUNITY

## 2024-11-25 RX ORDER — ASCORBIC ACID 1000 MG
TABLET ORAL
COMMUNITY

## 2024-11-25 RX ORDER — FLUCONAZOLE 100 MG/1
100 TABLET ORAL DAILY
Qty: 15 TABLET | Refills: 5 | Status: SHIPPED | OUTPATIENT
Start: 2024-11-25 | End: 2024-11-25

## 2024-11-25 NOTE — PROGRESS NOTES
Silke is a 74 year old that presents in clinic today for the following:     Chief Complaint   Patient presents with    Follow Up     Medication refill.           11/25/2024     3:13 PM   Additional Questions   Roomed by KTR   Accompanied by Chloe(Wife)     Screenings from encounters over the past 10 days    No data recorded       Michele Cutler, EMT at 3:26 PM on 11/25/2024    Answers submitted by the patient for this visit:  Lipid Visit (Submitted on 11/25/2024)  Chief Complaint: Chronic problems general questions HPI Form  Are you regularly taking any medication or supplement to lower your cholesterol?: Yes  Are you having muscle aches or other side effects that you think could be caused by your cholesterol lowering medication?: No  Hypertension Visit (Submitted on 11/25/2024)  Chief Complaint: Chronic problems general questions HPI Form  Do you check your blood pressure regularly outside of the clinic?: No  Are your blood pressures ever more than 140 on the top number (systolic) OR more than 90 on the bottom number (diastolic)? (For example, greater than 140/90): No  Are you following a low salt diet?: No  COPD (Chronic Obstructive Pulmonary Disease) Visit Questionnaire (Submitted on 11/25/2024)  Chief Complaint: Chronic problems general questions HPI Form  Current status of COPD symptom:: no change  Status of fatigue and dyspnea with ambulation:: none  Status of dyspnea:: none  Increase or change in cough or sputum:: sometimes  Have you noticed a change in your sputum/phlegm?: No  Have you experienced a recent fever?: No  Baseline ambulation without stopping to rest:: 2-5 blocks  Number of flights of stairs without resting:: 2  Have you had any Emergency Room, Urgent Care visits or a Hospital admission because of your COPD since your last office visit?: No  General Questionnaire (Submitted on 11/25/2024)  Chief Complaint: Chronic problems general questions HPI Form  How many servings of fruits and  vegetables do you eat daily?: 2-3  On average, how many sweetened beverages do you drink each day (Examples: soda, juice, sweet tea, etc.  Do NOT count diet or artificially sweetened beverages)?: 1  How many minutes a day do you exercise enough to make your heart beat faster?: 30 to 60  How many days a week do you exercise enough to make your heart beat faster?: 7  How many days per week do you miss taking your medication?: 0  Questionnaire about: Chronic problems general questions HPI Form (Submitted on 11/25/2024)  Chief Complaint: Chronic problems general questions HPI Form

## 2024-11-25 NOTE — PROGRESS NOTES
HPI  74-year-old returns today with his wife who functions as his .  He is much better now in regards to his cough.  Cough has been reduced significantly in fact he is concerned now that he may not be coughing enough.  Previously with his smoking history he had a extensive productive cough this has no diminished significantly in the frequency of the cough and also the productivity of the cough.  He is continuing to refrain from smoking.  He has had significant benefit from the inhaler he continues to use the inhaler.  We reviewed his recent CT lung cancer screening and we will plan to repeat this next year and reassured that it showed no evidence of emphysema.  We reviewed his recent PFTs and they also show no evidence of obstructive lung disease.  Will continue the Advair inhaler as he has been significantly better with this.  He has not increasing his walking and physical activity and tolerated this well.  They expressed concern about his weight but reviewing his weight graph over the past 5 years shows it to be essentially stable.  Past Medical History:   Diagnosis Date    Gastric ulcer     Heart attack (H)     Hyperlipidemia     Smoker      Past Surgical History:   Procedure Laterality Date    COLONOSCOPY N/A 11/22/2023    Procedure: Colonoscopy;  Surgeon: Paulino Shea MD;  Location:  GI    ESOPHAGOSCOPY, GASTROSCOPY, DUODENOSCOPY (EGD), COMBINED  6/30/2014    Procedure: COMBINED ESOPHAGOSCOPY, GASTROSCOPY, DUODENOSCOPY (EGD);  Surgeon: Stone Wan MD;  Location:  GI    ESOPHAGOSCOPY, GASTROSCOPY, DUODENOSCOPY (EGD), COMBINED N/A 11/22/2023    Procedure: Esophagoscopy, gastroscopy, duodenoscopy (EGD), combined;  Surgeon: Paulino Shea MD;  Location:  GI    NO HISTORY OF SURGERY       Family History   Problem Relation Age of Onset    Hypertension Father         sibling    Heart Disease Father     Glaucoma No family hx of     Macular Degeneration No family hx of   "        Exam:  /75 (BP Location: Right arm, Patient Position: Sitting, Cuff Size: Adult Regular)   Pulse 83   Temp 98.1  F (36.7  C) (Oral)   Resp 18   Ht 1.67 m (5' 5.75\")   Wt 77.9 kg (171 lb 12.8 oz)   SpO2 92%   BMI 27.94 kg/m    171 lbs 12.8 oz  The patient is alert, oriented with a clear sensorium.   Skin shows no lesions or rashes and good turgor.    Neck shows no nodes, thyromegaly.     Lungs are clear.   Heart shows normal S1 and S2 without murmur or gallop.    ABd nontender  Extremities show no edema.      ASSESSMENT  1 IGT  2 hyperlipidemia well controlled on atorvastatin 20 mg  3 hypertension controlled   4 former smoker needs annual CT lung cancer screen  5 GERD on omeprazole  6 CAD with STEMI 2019 with single-vessel disease S/P PCI  7 cough likely multifactorial but responsive to treatment for recent bronchitis    Plan  .  As long as he is continue to cough we will continue with the Advair inhaler for now and then switch to as needed.  Will have him continue the other medications as before increase his physical activity and exercise and plan to follow-up as a routine in 6 months follow-up sooner if symptoms or problems before that time    This note was completed using Dragon voice recognition software.      Paulino Harper MD  General Internal Medicine  [facility]  [phone]        "

## 2025-02-03 ENCOUNTER — MYC REFILL (OUTPATIENT)
Dept: INTERNAL MEDICINE | Facility: CLINIC | Age: 75
End: 2025-02-03

## 2025-02-03 ENCOUNTER — LAB (OUTPATIENT)
Dept: LAB | Facility: CLINIC | Age: 75
End: 2025-02-03
Payer: COMMERCIAL

## 2025-02-03 ENCOUNTER — OFFICE VISIT (OUTPATIENT)
Dept: INTERNAL MEDICINE | Facility: CLINIC | Age: 75
End: 2025-02-03
Payer: COMMERCIAL

## 2025-02-03 VITALS
BODY MASS INDEX: 27.18 KG/M2 | WEIGHT: 169.1 LBS | OXYGEN SATURATION: 95 % | HEIGHT: 66 IN | RESPIRATION RATE: 18 BRPM | TEMPERATURE: 98 F | DIASTOLIC BLOOD PRESSURE: 73 MMHG | SYSTOLIC BLOOD PRESSURE: 124 MMHG | HEART RATE: 79 BPM

## 2025-02-03 DIAGNOSIS — J20.9 ACUTE BRONCHITIS, UNSPECIFIED ORGANISM: ICD-10-CM

## 2025-02-03 DIAGNOSIS — J44.9 CHRONIC OBSTRUCTIVE PULMONARY DISEASE, UNSPECIFIED COPD TYPE (H): ICD-10-CM

## 2025-02-03 DIAGNOSIS — R73.02 IGT (IMPAIRED GLUCOSE TOLERANCE): ICD-10-CM

## 2025-02-03 DIAGNOSIS — Z29.11 NEED FOR VACCINATION AGAINST RESPIRATORY SYNCYTIAL VIRUS: ICD-10-CM

## 2025-02-03 DIAGNOSIS — I25.10 CORONARY ARTERY DISEASE INVOLVING NATIVE CORONARY ARTERY OF NATIVE HEART WITHOUT ANGINA PECTORIS: ICD-10-CM

## 2025-02-03 DIAGNOSIS — R12 HEARTBURN: ICD-10-CM

## 2025-02-03 DIAGNOSIS — J20.9 ACUTE BRONCHITIS, UNSPECIFIED ORGANISM: Primary | ICD-10-CM

## 2025-02-03 DIAGNOSIS — I21.4 NSTEMI (NON-ST ELEVATED MYOCARDIAL INFARCTION) (H): ICD-10-CM

## 2025-02-03 LAB
ANION GAP SERPL CALCULATED.3IONS-SCNC: 12 MMOL/L (ref 7–15)
BASOPHILS # BLD AUTO: 0 10E3/UL (ref 0–0.2)
BASOPHILS NFR BLD AUTO: 1 %
BUN SERPL-MCNC: 16.8 MG/DL (ref 8–23)
CALCIUM SERPL-MCNC: 9.7 MG/DL (ref 8.8–10.4)
CHLORIDE SERPL-SCNC: 105 MMOL/L (ref 98–107)
CREAT SERPL-MCNC: 1.06 MG/DL (ref 0.67–1.17)
EGFRCR SERPLBLD CKD-EPI 2021: 73 ML/MIN/1.73M2
EOSINOPHIL # BLD AUTO: 0.1 10E3/UL (ref 0–0.7)
EOSINOPHIL NFR BLD AUTO: 3 %
ERYTHROCYTE [DISTWIDTH] IN BLOOD BY AUTOMATED COUNT: 11.5 % (ref 10–15)
EST. AVERAGE GLUCOSE BLD GHB EST-MCNC: 114 MG/DL
GLUCOSE SERPL-MCNC: 91 MG/DL (ref 70–99)
HBA1C MFR BLD: 5.6 %
HCO3 SERPL-SCNC: 26 MMOL/L (ref 22–29)
HCT VFR BLD AUTO: 41.5 % (ref 40–53)
HGB BLD-MCNC: 14.3 G/DL (ref 13.3–17.7)
IMM GRANULOCYTES # BLD: 0 10E3/UL
IMM GRANULOCYTES NFR BLD: 0 %
LYMPHOCYTES # BLD AUTO: 2.1 10E3/UL (ref 0.8–5.3)
LYMPHOCYTES NFR BLD AUTO: 41 %
MCH RBC QN AUTO: 33 PG (ref 26.5–33)
MCHC RBC AUTO-ENTMCNC: 34.5 G/DL (ref 31.5–36.5)
MCV RBC AUTO: 96 FL (ref 78–100)
MONOCYTES # BLD AUTO: 0.5 10E3/UL (ref 0–1.3)
MONOCYTES NFR BLD AUTO: 10 %
NEUTROPHILS # BLD AUTO: 2.3 10E3/UL (ref 1.6–8.3)
NEUTROPHILS NFR BLD AUTO: 45 %
NRBC # BLD AUTO: 0 10E3/UL
NRBC BLD AUTO-RTO: 0 /100
PLATELET # BLD AUTO: 158 10E3/UL (ref 150–450)
POTASSIUM SERPL-SCNC: 4.5 MMOL/L (ref 3.4–5.3)
RBC # BLD AUTO: 4.33 10E6/UL (ref 4.4–5.9)
SODIUM SERPL-SCNC: 143 MMOL/L (ref 135–145)
WBC # BLD AUTO: 5.1 10E3/UL (ref 4–11)

## 2025-02-03 PROCEDURE — 99000 SPECIMEN HANDLING OFFICE-LAB: CPT | Performed by: PATHOLOGY

## 2025-02-03 PROCEDURE — 85025 COMPLETE CBC W/AUTO DIFF WBC: CPT | Performed by: PATHOLOGY

## 2025-02-03 PROCEDURE — 83036 HEMOGLOBIN GLYCOSYLATED A1C: CPT | Performed by: INTERNAL MEDICINE

## 2025-02-03 PROCEDURE — 80048 BASIC METABOLIC PNL TOTAL CA: CPT | Performed by: PATHOLOGY

## 2025-02-03 PROCEDURE — 36415 COLL VENOUS BLD VENIPUNCTURE: CPT | Performed by: PATHOLOGY

## 2025-02-03 RX ORDER — MONTELUKAST SODIUM 10 MG/1
10 TABLET ORAL AT BEDTIME
Qty: 30 TABLET | Refills: 1 | Status: SHIPPED | OUTPATIENT
Start: 2025-02-03

## 2025-02-03 RX ORDER — AMLODIPINE BESYLATE 5 MG/1
5 TABLET ORAL AT BEDTIME
Qty: 90 TABLET | Refills: 3 | Status: SHIPPED | OUTPATIENT
Start: 2025-02-03

## 2025-02-03 RX ORDER — FLUTICASONE PROPIONATE AND SALMETEROL 100; 50 UG/1; UG/1
1 POWDER RESPIRATORY (INHALATION) EVERY 12 HOURS
Qty: 180 EACH | Refills: 5 | Status: SHIPPED | OUTPATIENT
Start: 2025-02-03

## 2025-02-03 RX ORDER — OMEPRAZOLE 20 MG/1
20 CAPSULE, DELAYED RELEASE ORAL DAILY PRN
Qty: 90 CAPSULE | Refills: 3 | Status: CANCELLED | OUTPATIENT
Start: 2025-02-03

## 2025-02-03 RX ORDER — ASPIRIN 81 MG/1
81 TABLET ORAL EVERY OTHER DAY
Qty: 100 TABLET | Refills: 1 | Status: SHIPPED | OUTPATIENT
Start: 2025-02-03

## 2025-02-03 RX ORDER — METOPROLOL SUCCINATE 25 MG/1
12.5 TABLET, EXTENDED RELEASE ORAL DAILY
Qty: 45 TABLET | Refills: 3 | Status: CANCELLED | OUTPATIENT
Start: 2025-02-03

## 2025-02-03 RX ORDER — ATORVASTATIN CALCIUM 20 MG/1
20 TABLET, FILM COATED ORAL DAILY
Qty: 90 TABLET | Refills: 3 | Status: SHIPPED | OUTPATIENT
Start: 2025-02-03

## 2025-02-03 RX ORDER — INFLUENZA VACCINE, ADJUVANTED 15; 15; 15 UG/.5ML; UG/.5ML; UG/.5ML
0.5 INJECTION, SUSPENSION INTRAMUSCULAR ONCE
COMMUNITY
Start: 2024-10-24

## 2025-02-03 NOTE — PROGRESS NOTES
"HPI  75-year-old here today with his wife who functions as .  He has been doing well up until the last 10 days or so when he redeveloped his cough.  He has always had some degree of cough which they feel is related to clearing up the lungs from his former smoking.  He recently has increased his cough although is largely nonproductive.  It is not associated with any fever chills or sweats not associated with any dyspnea or swelling in the feet or the ankles.  He has not had any upper respiratory symptoms sore throat postnasal drip rhinorrhea or other complaints.  Otherwise he has been feeling well he has been using the Advair inhaler twice a day and he feels it does help and clear up his lungs after he uses it.  Past Medical History:   Diagnosis Date    Gastric ulcer     Heart attack (H)     Hyperlipidemia     Smoker      Past Surgical History:   Procedure Laterality Date    COLONOSCOPY N/A 11/22/2023    Procedure: Colonoscopy;  Surgeon: Paulino Shea MD;  Location:  GI    ESOPHAGOSCOPY, GASTROSCOPY, DUODENOSCOPY (EGD), COMBINED  6/30/2014    Procedure: COMBINED ESOPHAGOSCOPY, GASTROSCOPY, DUODENOSCOPY (EGD);  Surgeon: Stone Wan MD;  Location:  GI    ESOPHAGOSCOPY, GASTROSCOPY, DUODENOSCOPY (EGD), COMBINED N/A 11/22/2023    Procedure: Esophagoscopy, gastroscopy, duodenoscopy (EGD), combined;  Surgeon: Paulino Shea MD;  Location:  GI    NO HISTORY OF SURGERY       Family History   Problem Relation Age of Onset    Hypertension Father         sibling    Heart Disease Father     Glaucoma No family hx of     Macular Degeneration No family hx of          Exam:  /73 (BP Location: Right arm, Patient Position: Sitting, Cuff Size: Adult Regular)   Pulse 79   Temp 98  F (36.7  C) (Oral)   Resp 18   Ht 1.67 m (5' 5.75\")   Wt 76.7 kg (169 lb 1.6 oz)   SpO2 95%   BMI 27.50 kg/m    169 lbs 1.6 oz  Physical Exam   The patient is alert, oriented with a clear sensorium.   Skin shows " no lesions or rashes and good turgor.   Head is normocephalic and atraumatic.   Ears show normal TMs bilaterally.   Mouth shows clear oral mucosa.   Neck shows no nodes, thyromegaly or bruits.   Lungs are clear to auscultation no wheezing on forced expiration.   Heart shows normal S1 and S2 without murmur or gallop.   Extremities show no edema and no evidence of active synovitis.   Neurologic examination shows cranial nerves II-XII intact.   Labs reviewed:  Results for orders placed or performed in visit on 02/03/25   Basic metabolic panel     Status: Normal   Result Value Ref Range    Sodium 143 135 - 145 mmol/L    Potassium 4.5 3.4 - 5.3 mmol/L    Chloride 105 98 - 107 mmol/L    Carbon Dioxide (CO2) 26 22 - 29 mmol/L    Anion Gap 12 7 - 15 mmol/L    Urea Nitrogen 16.8 8.0 - 23.0 mg/dL    Creatinine 1.06 0.67 - 1.17 mg/dL    GFR Estimate 73 >60 mL/min/1.73m2    Calcium 9.7 8.8 - 10.4 mg/dL    Glucose 91 70 - 99 mg/dL   Hemoglobin A1c     Status: Normal   Result Value Ref Range    Estimated Average Glucose 114 <117 mg/dL    Hemoglobin A1C 5.6 <5.7 %   CBC with platelets and differential     Status: Abnormal   Result Value Ref Range    WBC Count 5.1 4.0 - 11.0 10e3/uL    RBC Count 4.33 (L) 4.40 - 5.90 10e6/uL    Hemoglobin 14.3 13.3 - 17.7 g/dL    Hematocrit 41.5 40.0 - 53.0 %    MCV 96 78 - 100 fL    MCH 33.0 26.5 - 33.0 pg    MCHC 34.5 31.5 - 36.5 g/dL    RDW 11.5 10.0 - 15.0 %    Platelet Count 158 150 - 450 10e3/uL    % Neutrophils 45 %    % Lymphocytes 41 %    % Monocytes 10 %    % Eosinophils 3 %    % Basophils 1 %    % Immature Granulocytes 0 %    NRBCs per 100 WBC 0 <1 /100    Absolute Neutrophils 2.3 1.6 - 8.3 10e3/uL    Absolute Lymphocytes 2.1 0.8 - 5.3 10e3/uL    Absolute Monocytes 0.5 0.0 - 1.3 10e3/uL    Absolute Eosinophils 0.1 0.0 - 0.7 10e3/uL    Absolute Basophils 0.0 0.0 - 0.2 10e3/uL    Absolute Immature Granulocytes 0.0 <=0.4 10e3/uL    Absolute NRBCs 0.0 10e3/uL   CBC with Platelets &  Differential     Status: Abnormal    Narrative    The following orders were created for panel order CBC with Platelets & Differential.  Procedure                               Abnormality         Status                     ---------                               -----------         ------                     CBC with platelets and d...[[id number]]  Abnormal            Final result               RBC and Platelet Morphology[[id number]]                                                   Please view results for these tests on the individual orders.       ASSESSMENT  1 cough on Advair with recent bronchitis  2 hyperlipidemia well controlled on atorvastatin 20 mg  3 hypertension controlled   4 former smoker needs annual CT lung cancer screen  5 GERD on omeprazole  6 CAD with STEMI 2019 with single-vessel disease S/P PCI  7 IGT    Plan  Will have him continue with the Advair twice a day will have him use montelukast once a day as needed for cough will continue with Tylenol as needed for other symptoms and follow-up chest x-ray if symptoms are not improved with this.  I also refilled his medications and will check his CBC A1c and BMP today.  This note was completed using Dragon voice recognition software.      Paulino Harper MD  General Internal Medicine  [facility]  [phone]

## 2025-02-06 NOTE — TELEPHONE ENCOUNTER
Rx available- pt to contact pharm regarding mailing out  metoprolol succinate ER (TOPROL XL) 25 MG 24 hr tablet   45 tablet 3 8/14/2024 -- No  Sig - Route: Take 0.5 tablets (12.5 mg) by mouth daily - Oral    omeprazole (PRILOSEC) 20 MG DR capsule 90 capsule 3 10/1/2024 -- No  Sig - Route: Take 1 capsule (20 mg) by mouth daily as needed (acid reflux). - Oral    [facility] [address] [phone]    Request from pharmacy:  Requested Prescriptions   Pending Prescriptions Disp Refills    metoprolol succinate ER (TOPROL XL) 25 MG 24 hr tablet 45 tablet 3     Sig: Take 0.5 tablets (12.5 mg) by mouth daily.       Beta-Blockers Protocol Failed - 2/6/2025 10:13 AM        Failed - Medication indicated for associated diagnosis     Medication is associated with one or more of the following diagnoses:     Hypertension (HTN)   Atrial fibrillation/flutter   Angina   ASCVD   Migraine   Heart Failure   Tremor   Anxiety   Ocular hypertension   Glaucoma   PTSD   Obstructive hypertrophic cardiomyopathy   History of myocarditis   Palpitations   POTS (postural orthostatic tachycardia syndrome)   SVT (supraventricular tachycardia)   Hyperthyroidism   Tachycardia   Acute non-st segment elevation myocardial infarction   Subsequent non-st segment elevation myocardial infarction   Ischemic myocardial dysfunction          Passed - Most recent blood pressure under 140/90 in past 12 months     BP Readings from Last 3 Encounters:   02/03/25 124/73   11/25/24 134/75   10/10/24 118/72       No data recorded            Passed - Patient is age 6 or older        Passed - Medication is active on med list        Passed - Recent (12 mo) or future (90 days) visit within the authorizing provider's specialty     The patient must have completed an in-person or virtual visit within the past 12 months or has a future visit scheduled within the next 90 days with the authorizing provider s  specialty.  Urgent care and e-visits do not qualify as an office visit for this protocol.            omeprazole (PRILOSEC) 20 MG DR capsule 90 capsule 3     Sig: Take 1 capsule (20 mg) by mouth daily as needed (acid reflux).       PPI Protocol Passed - 2/6/2025 10:13 AM        Passed - Medication is active on med list        Passed - Medication indicated for associated diagnosis     The medication is prescribed for one or more of the following conditions:     Erosive esophagitis    Gastritis   Gastric hypersecretion   Gastric ulcer   Gastroesophageal reflux disease   Helicobacter pylori gastrointestinal tract infection   Ulcer of duodenum   Drug-induced peptic ulcer   Esophageal stricture   Gastrointestinal hemorrhage   Indigestion   Stress ulcer   Zollinger-Richter syndrome   Anne s esophagus   Laryngopharyngeal reflux   Epigastric Pain   Morbid Obesity   Cough   History of Peptic Ulcer   Esophageal Atresia, Stenosis and Fistula   Cystic Fibrosis  Bronchiectasis          Passed - Recent (12 mo) or future (90 days) visit within the authorizing provider's specialty     The patient must have completed an in-person or virtual visit within the past 12 months or has a future visit scheduled within the next 90 days with the authorizing provider s specialty.  Urgent care and e-visits do not qualify as an office visit for this protocol.          Passed - Patient is age 18 or older

## (undated) RX ORDER — FENTANYL CITRATE 50 UG/ML
INJECTION, SOLUTION INTRAMUSCULAR; INTRAVENOUS
Status: DISPENSED
Start: 2023-11-22

## (undated) RX ORDER — ALBUTEROL SULFATE 0.83 MG/ML
SOLUTION RESPIRATORY (INHALATION)
Status: DISPENSED
Start: 2017-08-30

## (undated) RX ORDER — LIDOCAINE HYDROCHLORIDE 20 MG/ML
SOLUTION OROPHARYNGEAL
Status: DISPENSED
Start: 2023-01-12

## (undated) RX ORDER — ALBUTEROL SULFATE 0.83 MG/ML
SOLUTION RESPIRATORY (INHALATION)
Status: DISPENSED
Start: 2023-01-12